# Patient Record
Sex: MALE | Race: WHITE | Employment: FULL TIME | URBAN - METROPOLITAN AREA
[De-identification: names, ages, dates, MRNs, and addresses within clinical notes are randomized per-mention and may not be internally consistent; named-entity substitution may affect disease eponyms.]

---

## 2017-03-02 ENCOUNTER — HOSPITAL ENCOUNTER (EMERGENCY)
Age: 52
Discharge: HOME | End: 2017-03-02
Attending: EMERGENCY MEDICINE
Payer: COMMERCIAL

## 2017-03-02 ENCOUNTER — OFFICE VISIT (OUTPATIENT)
Dept: INTERNAL MEDICINE | Age: 52
End: 2017-03-02

## 2017-03-02 VITALS
WEIGHT: 228 LBS | RESPIRATION RATE: 18 BRPM | OXYGEN SATURATION: 93 % | HEIGHT: 69 IN | TEMPERATURE: 97.7 F | DIASTOLIC BLOOD PRESSURE: 79 MMHG | HEART RATE: 63 BPM | SYSTOLIC BLOOD PRESSURE: 153 MMHG | BODY MASS INDEX: 33.77 KG/M2

## 2017-03-02 VITALS
BODY MASS INDEX: 33.77 KG/M2 | HEIGHT: 69 IN | WEIGHT: 228 LBS | HEART RATE: 90 BPM | SYSTOLIC BLOOD PRESSURE: 130 MMHG | DIASTOLIC BLOOD PRESSURE: 88 MMHG

## 2017-03-02 DIAGNOSIS — I89.1 LYMPHANGITIS: ICD-10-CM

## 2017-03-02 DIAGNOSIS — L03.011 CELLULITIS OF FINGER OF RIGHT HAND: Primary | ICD-10-CM

## 2017-03-02 DIAGNOSIS — L03.113 CELLULITIS OF RIGHT UPPER EXTREMITY: Primary | ICD-10-CM

## 2017-03-02 PROCEDURE — 2580000003 HC RX 258: Performed by: EMERGENCY MEDICINE

## 2017-03-02 PROCEDURE — 96366 THER/PROPH/DIAG IV INF ADDON: CPT

## 2017-03-02 PROCEDURE — 6360000002 HC RX W HCPCS: Performed by: EMERGENCY MEDICINE

## 2017-03-02 PROCEDURE — 99282 EMERGENCY DEPT VISIT SF MDM: CPT

## 2017-03-02 PROCEDURE — 96365 THER/PROPH/DIAG IV INF INIT: CPT

## 2017-03-02 RX ORDER — SULFAMETHOXAZOLE AND TRIMETHOPRIM 800; 160 MG/1; MG/1
1 TABLET ORAL 2 TIMES DAILY
Qty: 14 TABLET | Refills: 0 | Status: SHIPPED | OUTPATIENT
Start: 2017-03-02 | End: 2017-03-09

## 2017-03-02 RX ORDER — CEPHALEXIN 500 MG/1
500 CAPSULE ORAL 4 TIMES DAILY
Qty: 28 CAPSULE | Refills: 0 | Status: SHIPPED | OUTPATIENT
Start: 2017-03-02 | End: 2017-03-09

## 2017-03-02 RX ORDER — SODIUM CHLORIDE 9 MG/ML
INJECTION, SOLUTION INTRAVENOUS CONTINUOUS
Status: DISCONTINUED | OUTPATIENT
Start: 2017-03-02 | End: 2017-03-02 | Stop reason: HOSPADM

## 2017-03-02 RX ADMIN — VANCOMYCIN HYDROCHLORIDE 1000 MG: 1 INJECTION, POWDER, LYOPHILIZED, FOR SOLUTION INTRAVENOUS at 10:32

## 2017-03-02 RX ADMIN — SODIUM CHLORIDE: 9 INJECTION, SOLUTION INTRAVENOUS at 10:31

## 2017-03-02 ASSESSMENT — ENCOUNTER SYMPTOMS
BACK PAIN: 0
DIARRHEA: 0
SHORTNESS OF BREATH: 0
TROUBLE SWALLOWING: 0
BLOOD IN STOOL: 0
VOMITING: 0
COUGH: 0
NAUSEA: 0
EYE PAIN: 0
CHEST TIGHTNESS: 0
COLOR CHANGE: 1
WHEEZING: 0
RHINORRHEA: 0
ABDOMINAL PAIN: 0

## 2017-03-06 ENCOUNTER — OFFICE VISIT (OUTPATIENT)
Dept: INTERNAL MEDICINE | Age: 52
End: 2017-03-06

## 2017-03-06 VITALS
DIASTOLIC BLOOD PRESSURE: 82 MMHG | BODY MASS INDEX: 34.07 KG/M2 | SYSTOLIC BLOOD PRESSURE: 128 MMHG | HEIGHT: 69 IN | WEIGHT: 230 LBS | HEART RATE: 84 BPM | RESPIRATION RATE: 12 BRPM | TEMPERATURE: 97.9 F

## 2017-03-06 DIAGNOSIS — I89.1 LYMPHANGITIS: ICD-10-CM

## 2017-03-06 DIAGNOSIS — L03.113 CELLULITIS OF HAND, RIGHT: Primary | ICD-10-CM

## 2017-03-06 PROCEDURE — 99212 OFFICE O/P EST SF 10 MIN: CPT | Performed by: PHYSICIAN ASSISTANT

## 2017-03-06 RX ORDER — ACETAMINOPHEN 325 MG/1
650 TABLET ORAL PRN
COMMUNITY
End: 2019-06-06 | Stop reason: ALTCHOICE

## 2017-03-06 ASSESSMENT — PATIENT HEALTH QUESTIONNAIRE - PHQ9
SUM OF ALL RESPONSES TO PHQ QUESTIONS 1-9: 0
1. LITTLE INTEREST OR PLEASURE IN DOING THINGS: 0
SUM OF ALL RESPONSES TO PHQ9 QUESTIONS 1 & 2: 0
2. FEELING DOWN, DEPRESSED OR HOPELESS: 0

## 2017-03-06 ASSESSMENT — ENCOUNTER SYMPTOMS: COLOR CHANGE: 1

## 2017-04-26 ENCOUNTER — PATIENT MESSAGE (OUTPATIENT)
Dept: INTERNAL MEDICINE | Age: 52
End: 2017-04-26

## 2017-05-31 ENCOUNTER — OFFICE VISIT (OUTPATIENT)
Dept: INTERNAL MEDICINE | Age: 52
End: 2017-05-31

## 2017-05-31 VITALS
HEIGHT: 69 IN | WEIGHT: 234 LBS | TEMPERATURE: 97.6 F | DIASTOLIC BLOOD PRESSURE: 82 MMHG | SYSTOLIC BLOOD PRESSURE: 120 MMHG | OXYGEN SATURATION: 97 % | BODY MASS INDEX: 34.66 KG/M2 | HEART RATE: 80 BPM

## 2017-05-31 DIAGNOSIS — J30.2 SEASONAL ALLERGIC RHINITIS, UNSPECIFIED ALLERGIC RHINITIS TRIGGER: Primary | ICD-10-CM

## 2017-05-31 PROCEDURE — 99213 OFFICE O/P EST LOW 20 MIN: CPT | Performed by: FAMILY MEDICINE

## 2017-05-31 RX ORDER — CETIRIZINE HYDROCHLORIDE, PSEUDOEPHEDRINE HYDROCHLORIDE 5; 120 MG/1; MG/1
1 TABLET, FILM COATED, EXTENDED RELEASE ORAL 2 TIMES DAILY
Qty: 20 TABLET | Refills: 0 | Status: SHIPPED | OUTPATIENT
Start: 2017-05-31 | End: 2017-06-10

## 2017-05-31 RX ORDER — IPRATROPIUM BROMIDE 42 UG/1
2 SPRAY, METERED NASAL 3 TIMES DAILY
Qty: 1 BOTTLE | Refills: 3 | Status: SHIPPED | OUTPATIENT
Start: 2017-05-31 | End: 2018-08-23

## 2017-06-02 ENCOUNTER — TELEPHONE (OUTPATIENT)
Dept: INTERNAL MEDICINE | Age: 52
End: 2017-06-02

## 2017-06-02 RX ORDER — AZITHROMYCIN 250 MG/1
TABLET, FILM COATED ORAL
Qty: 1 PACKET | Refills: 0 | Status: SHIPPED | OUTPATIENT
Start: 2017-06-02 | End: 2017-06-12

## 2017-07-19 ENCOUNTER — OFFICE VISIT (OUTPATIENT)
Dept: INTERNAL MEDICINE | Age: 52
End: 2017-07-19

## 2017-07-19 VITALS
BODY MASS INDEX: 34.39 KG/M2 | HEART RATE: 85 BPM | SYSTOLIC BLOOD PRESSURE: 102 MMHG | HEIGHT: 69 IN | WEIGHT: 232.2 LBS | DIASTOLIC BLOOD PRESSURE: 80 MMHG

## 2017-07-19 DIAGNOSIS — Z00.00 ANNUAL PHYSICAL EXAM: Primary | ICD-10-CM

## 2017-07-19 DIAGNOSIS — Z13.220 SCREENING CHOLESTEROL LEVEL: ICD-10-CM

## 2017-07-19 DIAGNOSIS — Z11.59 NEED FOR HEPATITIS C SCREENING TEST: ICD-10-CM

## 2017-07-19 DIAGNOSIS — Z13.1 SCREENING FOR DIABETES MELLITUS: ICD-10-CM

## 2017-07-19 DIAGNOSIS — R63.5 WEIGHT GAIN: ICD-10-CM

## 2017-07-19 PROCEDURE — 99396 PREV VISIT EST AGE 40-64: CPT | Performed by: FAMILY MEDICINE

## 2017-07-19 RX ORDER — PHENTERMINE HYDROCHLORIDE 37.5 MG/1
37.5 CAPSULE ORAL EVERY MORNING
Qty: 30 CAPSULE | Refills: 0 | Status: SHIPPED | OUTPATIENT
Start: 2017-07-19 | End: 2017-08-16 | Stop reason: SDUPTHER

## 2017-07-19 ASSESSMENT — ENCOUNTER SYMPTOMS
EYE PAIN: 0
EYE DISCHARGE: 0
EYE ITCHING: 0

## 2017-07-22 ENCOUNTER — HOSPITAL ENCOUNTER (OUTPATIENT)
Dept: LAB | Age: 52
Discharge: HOME | End: 2017-07-22
Payer: COMMERCIAL

## 2017-07-22 DIAGNOSIS — Z13.220 SCREENING CHOLESTEROL LEVEL: ICD-10-CM

## 2017-07-22 DIAGNOSIS — Z13.1 SCREENING FOR DIABETES MELLITUS: ICD-10-CM

## 2017-07-22 DIAGNOSIS — Z11.59 NEED FOR HEPATITIS C SCREENING TEST: ICD-10-CM

## 2017-07-22 LAB
CHOLESTEROL, TOTAL: 174 MG/DL (ref 0–199)
GLUCOSE BLD-MCNC: 101 MG/DL (ref 74–109)
HDLC SERPL-MCNC: 32 MG/DL (ref 40–59)
HEPATITIS C ANTIBODY INTERPRETATION: NORMAL
LDL CHOLESTEROL CALCULATED: 98 MG/DL (ref 0–129)
TRIGL SERPL-MCNC: 222 MG/DL (ref 0–200)

## 2017-07-22 PROCEDURE — 86803 HEPATITIS C AB TEST: CPT

## 2017-07-22 PROCEDURE — 36415 COLL VENOUS BLD VENIPUNCTURE: CPT

## 2017-07-22 PROCEDURE — 82947 ASSAY GLUCOSE BLOOD QUANT: CPT

## 2017-07-22 PROCEDURE — 80061 LIPID PANEL: CPT

## 2017-08-16 ENCOUNTER — OFFICE VISIT (OUTPATIENT)
Dept: INTERNAL MEDICINE | Age: 52
End: 2017-08-16

## 2017-08-16 VITALS
WEIGHT: 229 LBS | BODY MASS INDEX: 33.92 KG/M2 | DIASTOLIC BLOOD PRESSURE: 88 MMHG | HEART RATE: 78 BPM | SYSTOLIC BLOOD PRESSURE: 118 MMHG | HEIGHT: 69 IN

## 2017-08-16 DIAGNOSIS — R63.5 WEIGHT GAIN: ICD-10-CM

## 2017-08-16 PROCEDURE — 99213 OFFICE O/P EST LOW 20 MIN: CPT | Performed by: FAMILY MEDICINE

## 2017-08-16 RX ORDER — PHENTERMINE HYDROCHLORIDE 37.5 MG/1
37.5 CAPSULE ORAL EVERY MORNING
Qty: 30 CAPSULE | Refills: 0 | Status: SHIPPED | OUTPATIENT
Start: 2017-08-16 | End: 2017-09-15

## 2017-08-16 ASSESSMENT — ENCOUNTER SYMPTOMS
SHORTNESS OF BREATH: 0
CHEST TIGHTNESS: 0

## 2017-11-03 ENCOUNTER — NURSE ONLY (OUTPATIENT)
Dept: INTERNAL MEDICINE | Age: 52
End: 2017-11-03

## 2017-11-03 ENCOUNTER — TELEPHONE (OUTPATIENT)
Dept: INTERNAL MEDICINE | Age: 52
End: 2017-11-03

## 2017-11-03 VITALS — SYSTOLIC BLOOD PRESSURE: 140 MMHG | DIASTOLIC BLOOD PRESSURE: 90 MMHG

## 2017-11-03 DIAGNOSIS — R03.0 ELEVATED BP WITHOUT DIAGNOSIS OF HYPERTENSION: Primary | ICD-10-CM

## 2017-11-03 NOTE — PROGRESS NOTES
Pt was in today for a blood pressure check because someone took his bp and it was high.  They told him to have it checked again by us several times to see if its still high

## 2017-11-06 ENCOUNTER — OFFICE VISIT (OUTPATIENT)
Dept: INTERNAL MEDICINE | Age: 52
End: 2017-11-06

## 2017-11-06 VITALS
SYSTOLIC BLOOD PRESSURE: 140 MMHG | BODY MASS INDEX: 34.36 KG/M2 | WEIGHT: 232 LBS | HEIGHT: 69 IN | HEART RATE: 97 BPM | DIASTOLIC BLOOD PRESSURE: 84 MMHG

## 2017-11-06 DIAGNOSIS — Z23 NEED FOR INFLUENZA VACCINATION: ICD-10-CM

## 2017-11-06 DIAGNOSIS — I10 ESSENTIAL HYPERTENSION: Primary | ICD-10-CM

## 2017-11-06 PROCEDURE — 90471 IMMUNIZATION ADMIN: CPT | Performed by: FAMILY MEDICINE

## 2017-11-06 PROCEDURE — 90688 IIV4 VACCINE SPLT 0.5 ML IM: CPT | Performed by: FAMILY MEDICINE

## 2017-11-06 PROCEDURE — 99213 OFFICE O/P EST LOW 20 MIN: CPT | Performed by: FAMILY MEDICINE

## 2017-11-06 RX ORDER — LISINOPRIL 10 MG/1
10 TABLET ORAL DAILY
Qty: 30 TABLET | Refills: 0 | Status: SHIPPED | OUTPATIENT
Start: 2017-11-06 | End: 2018-08-23

## 2017-11-06 NOTE — PATIENT INSTRUCTIONS
Patient Education        DASH Diet: Care Instructions  Your Care Instructions  The DASH diet is an eating plan that can help lower your blood pressure. DASH stands for Dietary Approaches to Stop Hypertension. Hypertension is high blood pressure. The DASH diet focuses on eating foods that are high in calcium, potassium, and magnesium. These nutrients can lower blood pressure. The foods that are highest in these nutrients are fruits, vegetables, low-fat dairy products, nuts, seeds, and legumes. But taking calcium, potassium, and magnesium supplements instead of eating foods that are high in those nutrients does not have the same effect. The DASH diet also includes whole grains, fish, and poultry. The DASH diet is one of several lifestyle changes your doctor may recommend to lower your high blood pressure. Your doctor may also want you to decrease the amount of sodium in your diet. Lowering sodium while following the DASH diet can lower blood pressure even further than just the DASH diet alone. Follow-up care is a key part of your treatment and safety. Be sure to make and go to all appointments, and call your doctor if you are having problems. It's also a good idea to know your test results and keep a list of the medicines you take. How can you care for yourself at home? Following the DASH diet  · Eat 4 to 5 servings of fruit each day. A serving is 1 medium-sized piece of fruit, ½ cup chopped or canned fruit, 1/4 cup dried fruit, or 4 ounces (½ cup) of fruit juice. Choose fruit more often than fruit juice. · Eat 4 to 5 servings of vegetables each day. A serving is 1 cup of lettuce or raw leafy vegetables, ½ cup of chopped or cooked vegetables, or 4 ounces (½ cup) of vegetable juice. Choose vegetables more often than vegetable juice. · Get 2 to 3 servings of low-fat and fat-free dairy each day. A serving is 8 ounces of milk, 1 cup of yogurt, or 1 ½ ounces of cheese. · Eat 6 to 8 servings of grains each day.  A serving is 1 slice of bread, 1 ounce of dry cereal, or ½ cup of cooked rice, pasta, or cooked cereal. Try to choose whole-grain products as much as possible. · Limit lean meat, poultry, and fish to 2 servings each day. A serving is 3 ounces, about the size of a deck of cards. · Eat 4 to 5 servings of nuts, seeds, and legumes (cooked dried beans, lentils, and split peas) each week. A serving is 1/3 cup of nuts, 2 tablespoons of seeds, or ½ cup of cooked beans or peas. · Limit fats and oils to 2 to 3 servings each day. A serving is 1 teaspoon of vegetable oil or 2 tablespoons of salad dressing. · Limit sweets and added sugars to 5 servings or less a week. A serving is 1 tablespoon jelly or jam, ½ cup sorbet, or 1 cup of lemonade. · Eat less than 2,300 milligrams (mg) of sodium a day. If you limit your sodium to 1,500 mg a day, you can lower your blood pressure even more. Tips for success  · Start small. Do not try to make dramatic changes to your diet all at once. You might feel that you are missing out on your favorite foods and then be more likely to not follow the plan. Make small changes, and stick with them. Once those changes become habit, add a few more changes. · Try some of the following:  ¨ Make it a goal to eat a fruit or vegetable at every meal and at snacks. This will make it easy to get the recommended amount of fruits and vegetables each day. ¨ Try yogurt topped with fruit and nuts for a snack or healthy dessert. ¨ Add lettuce, tomato, cucumber, and onion to sandwiches. ¨ Combine a ready-made pizza crust with low-fat mozzarella cheese and lots of vegetable toppings. Try using tomatoes, squash, spinach, broccoli, carrots, cauliflower, and onions. ¨ Have a variety of cut-up vegetables with a low-fat dip as an appetizer instead of chips and dip. ¨ Sprinkle sunflower seeds or chopped almonds over salads. Or try adding chopped walnuts or almonds to cooked vegetables.   ¨ Try some vegetarian meals using beans and peas. Add garbanzo or kidney beans to salads. Make burritos and tacos with mashed avina beans or black beans. Where can you learn more? Go to https://[email or web address]. org and sign in to your [employer] account. Enter A195 in the Webalo box to learn more about \"DASH Diet: Care Instructions. \"     If you do not have an account, please click on the \"Sign Up Now\" link. Current as of: April 3, 2017  Content Version: 11.3  © 4892-3133 [employer]. Care instructions adapted under license by [facility] ([facility]). If you have questions about a medical condition or this instruction, always ask your healthcare professional. [address] any warranty or liability for your use of this information. Patient Education        Learning About High Blood Pressure  What is high blood pressure? Blood pressure is a measure of how hard the blood pushes against the walls of your arteries. It's normal for blood pressure to go up and down throughout the day, but if it stays up, you have high blood pressure. Another name for high blood pressure is hypertension. Two numbers tell you your blood pressure. The first number is the systolic pressure. It shows how hard the blood pushes when your heart is pumping. The second number is the diastolic pressure. It shows how hard the blood pushes between heartbeats, when your heart is relaxed and filling with blood. A blood pressure of less than 120/80 (say \"120 over 80\") is ideal for an adult. High blood pressure is 140/90 or higher. You have high blood pressure if your top number is 140 or higher or your bottom number is 90 or higher, or both. Many people fall into the category in between, called prehypertension. People with prehypertension need to make lifestyle changes to bring their blood pressure down and help prevent or delay high blood pressure. What happens when you have high blood pressure?   · Blood flows through your arteries with too much force. Over time, this damages the walls of your arteries. But you can't feel it. High blood pressure usually doesn't cause symptoms. · Fat and calcium start to build up in your arteries. This buildup is called plaque. Plaque makes your arteries narrower and stiffer. Blood can't flow through them as easily. · This lack of good blood flow starts to damage some of the organs in your body. This can lead to problems such as coronary artery disease and heart attack, heart failure, stroke, kidney failure, and eye damage. How can you prevent high blood pressure? · Stay at a healthy weight. · Try to limit how much sodium you eat to less than 2,300 milligrams (mg) a day. If you limit your sodium to 1,500 mg a day, you can lower your blood pressure even more. ¨ Buy foods that are labeled \"unsalted,\" \"sodium-free,\" or \"low-sodium. \" Foods labeled \"reduced-sodium\" and \"light sodium\" may still have too much sodium. ¨ Flavor your food with garlic, lemon juice, onion, vinegar, herbs, and spices instead of salt. Do not use soy sauce, steak sauce, onion salt, garlic salt, mustard, or ketchup on your food. ¨ Use less salt (or none) when recipes call for it. You can often use half the salt a recipe calls for without losing flavor. · Be physically active. Get at least 30 minutes of exercise on most days of the week. Walking is a good choice. You also may want to do other activities, such as running, swimming, cycling, or playing tennis or team sports. · Limit alcohol to 2 drinks a day for men and 1 drink a day for women. · Eat plenty of fruits, vegetables, and low-fat dairy products. Eat less saturated and total fats. How is high blood pressure treated? · Your doctor will suggest making lifestyle changes. For example, your doctor may ask you to eat healthy foods, quit smoking, lose extra weight, and be more active.   · If lifestyle changes don't help enough or your blood pressure is very high, you

## 2017-11-21 ENCOUNTER — TELEPHONE (OUTPATIENT)
Dept: INTERNAL MEDICINE | Age: 52
End: 2017-11-21

## 2017-11-21 ENCOUNTER — HOSPITAL ENCOUNTER (OUTPATIENT)
Age: 52
Setting detail: SPECIMEN
Discharge: HOME | End: 2017-11-21
Payer: COMMERCIAL

## 2017-11-21 ENCOUNTER — NURSE ONLY (OUTPATIENT)
Dept: INTERNAL MEDICINE | Age: 52
End: 2017-11-21

## 2017-11-21 VITALS — DIASTOLIC BLOOD PRESSURE: 85 MMHG | SYSTOLIC BLOOD PRESSURE: 125 MMHG

## 2017-11-21 DIAGNOSIS — I10 ESSENTIAL HYPERTENSION, BENIGN: ICD-10-CM

## 2017-11-21 LAB
ALBUMIN SERPL-MCNC: 4.4 G/DL (ref 3.9–4.9)
ALP BLD-CCNC: 71 U/L (ref 35–104)
ALT SERPL-CCNC: 40 U/L (ref 0–41)
ANION GAP SERPL CALCULATED.3IONS-SCNC: 16 MEQ/L (ref 7–13)
AST SERPL-CCNC: 32 U/L (ref 0–40)
BILIRUB SERPL-MCNC: 0.4 MG/DL (ref 0–1.2)
BUN BLDV-MCNC: 13 MG/DL (ref 6–20)
CALCIUM SERPL-MCNC: 9.8 MG/DL (ref 8.6–10.2)
CHLORIDE BLD-SCNC: 97 MEQ/L (ref 98–107)
CO2: 26 MEQ/L (ref 22–29)
CREAT SERPL-MCNC: 0.8 MG/DL (ref 0.7–1.2)
GFR AFRICAN AMERICAN: >60
GFR NON-AFRICAN AMERICAN: >60
GLOBULIN: 2.3 G/DL (ref 2.3–3.5)
GLUCOSE BLD-MCNC: 125 MG/DL (ref 74–109)
POTASSIUM SERPL-SCNC: 4.7 MEQ/L (ref 3.5–5.1)
SODIUM BLD-SCNC: 139 MEQ/L (ref 132–144)
TOTAL PROTEIN: 6.7 G/DL (ref 6.4–8.1)
TSH REFLEX: 1.42 UIU/ML (ref 0.27–4.2)

## 2017-11-21 PROCEDURE — 36415 COLL VENOUS BLD VENIPUNCTURE: CPT | Performed by: FAMILY MEDICINE

## 2017-11-21 PROCEDURE — 84443 ASSAY THYROID STIM HORMONE: CPT

## 2017-11-21 PROCEDURE — 80053 COMPREHEN METABOLIC PANEL: CPT

## 2017-11-21 NOTE — TELEPHONE ENCOUNTER
Patient here for bp check today . todays reading here was 125/85.  bp log scanned in media please advise

## 2018-01-06 ENCOUNTER — OFFICE VISIT (OUTPATIENT)
Dept: FAMILY MEDICINE CLINIC | Age: 53
End: 2018-01-06

## 2018-01-06 VITALS
DIASTOLIC BLOOD PRESSURE: 82 MMHG | TEMPERATURE: 97.4 F | HEART RATE: 71 BPM | RESPIRATION RATE: 16 BRPM | BODY MASS INDEX: 36.1 KG/M2 | SYSTOLIC BLOOD PRESSURE: 136 MMHG | OXYGEN SATURATION: 97 % | HEIGHT: 68 IN | WEIGHT: 238.2 LBS

## 2018-01-06 DIAGNOSIS — T14.8XXA MUSCLE STRAIN: ICD-10-CM

## 2018-01-06 DIAGNOSIS — B96.89 ACUTE BACTERIAL SINUSITIS: Primary | ICD-10-CM

## 2018-01-06 DIAGNOSIS — J01.90 ACUTE BACTERIAL SINUSITIS: Primary | ICD-10-CM

## 2018-01-06 PROCEDURE — 99213 OFFICE O/P EST LOW 20 MIN: CPT | Performed by: NURSE PRACTITIONER

## 2018-01-06 RX ORDER — TIZANIDINE 4 MG/1
4 TABLET ORAL 3 TIMES DAILY
Qty: 21 TABLET | Refills: 0 | Status: SHIPPED | OUTPATIENT
Start: 2018-01-06 | End: 2018-01-13

## 2018-01-06 RX ORDER — AMOXICILLIN AND CLAVULANATE POTASSIUM 875; 125 MG/1; MG/1
1 TABLET, FILM COATED ORAL 2 TIMES DAILY
Qty: 14 TABLET | Refills: 0 | Status: SHIPPED | OUTPATIENT
Start: 2018-01-06 | End: 2018-01-13

## 2018-01-06 ASSESSMENT — ENCOUNTER SYMPTOMS
ABDOMINAL PAIN: 0
ANAL BLEEDING: 0
CHEST TIGHTNESS: 0
BACK PAIN: 1
COUGH: 1
HOARSE VOICE: 0
CONSTIPATION: 0
SHORTNESS OF BREATH: 0
SWOLLEN GLANDS: 0
SORE THROAT: 1
ABDOMINAL DISTENTION: 0
BLOOD IN STOOL: 0
DIARRHEA: 0
WHEEZING: 0
NAUSEA: 0
SINUS PRESSURE: 1
VOMITING: 0
BOWEL INCONTINENCE: 0

## 2018-01-06 NOTE — PROGRESS NOTES
kg)   SpO2 97%   BMI 36.22 kg/m²     Physical Exam   Constitutional: He is oriented to person, place, and time. Vital signs are normal. He appears well-developed and well-nourished. No distress. HENT:   Head: Normocephalic and atraumatic. Right Ear: External ear and ear canal normal. No drainage, swelling or tenderness. Tympanic membrane is not erythematous. A middle ear effusion is present. Left Ear: External ear and ear canal normal. No drainage, swelling or tenderness. Tympanic membrane is not erythematous. A middle ear effusion is present. Nose: Mucosal edema, rhinorrhea and sinus tenderness present. Right sinus exhibits no maxillary sinus tenderness and no frontal sinus tenderness. Left sinus exhibits no maxillary sinus tenderness and no frontal sinus tenderness. Mouth/Throat: Uvula is midline and mucous membranes are normal. Posterior oropharyngeal edema and posterior oropharyngeal erythema present. No oropharyngeal exudate. Cardiovascular: Normal rate, regular rhythm, S1 normal, S2 normal, normal heart sounds, intact distal pulses and normal pulses. No murmur heard. Pulmonary/Chest: Effort normal and breath sounds normal. No accessory muscle usage. No respiratory distress. He has no decreased breath sounds. He has no wheezes. He has no rhonchi. He has no rales. Musculoskeletal:        Lumbar back: He exhibits tenderness, swelling and pain. He exhibits normal range of motion, no bony tenderness, no deformity, no laceration and normal pulse. Lymphadenopathy:     He has cervical adenopathy. Neurological: He is alert and oriented to person, place, and time. Skin: Skin is warm, dry and intact. No rash noted. Psychiatric: He has a normal mood and affect. His behavior is normal.     Assessment & Plan:     1. Acute bacterial sinusitis  amoxicillin-clavulanate (AUGMENTIN) 875-125 MG per tablet   2.  Muscle strain  tiZANidine (ZANAFLEX) 4 MG tablet     Orders Placed This Encounter Medications    amoxicillin-clavulanate (AUGMENTIN) 875-125 MG per tablet     Sig: Take 1 tablet by mouth 2 times daily for 7 days     Dispense:  14 tablet     Refill:  0    tiZANidine (ZANAFLEX) 4 MG tablet     Sig: Take 1 tablet by mouth 3 times daily for 7 days     Dispense:  21 tablet     Refill:  0     Return if symptoms worsen or fail to improve. Advised patient to drink plenty of fluids and to take the antibiotic with food. If any problems occur, an appointment should be made or ER visit if severe. Because of the risk with ANY antibiotic of C. Difficile colitis if persistent diarrhea or abdominal pain or any concerning symptoms, we should be notified. To reduce this risk, a probiotic pill, yogurt or other preparations containing active cultures should be ingested daily -particularly while on the antibiotic. If any persistent symptoms of illness, follow up appointment should be made in a timely fashion with a provider. Reviewed with the patient: current clinical status, medications, activities and diet. Side effects, adverse effects of the medication prescribed today, as well as treatment plan/ rationale and result expectations have been discussed with the patient who expresses understanding and desires to proceed. Pt instructions reviewed and given to patient.     Close follow up to evaluate treatment results and for coordination of care. I have reviewed the patient's medical history in detail and updated the computerized patient record.     Alexandria Cheek NP

## 2018-02-17 ENCOUNTER — OFFICE VISIT (OUTPATIENT)
Dept: FAMILY MEDICINE CLINIC | Age: 53
End: 2018-02-17
Payer: COMMERCIAL

## 2018-02-17 VITALS
RESPIRATION RATE: 14 BRPM | WEIGHT: 237 LBS | DIASTOLIC BLOOD PRESSURE: 82 MMHG | BODY MASS INDEX: 35.1 KG/M2 | HEART RATE: 98 BPM | HEIGHT: 69 IN | SYSTOLIC BLOOD PRESSURE: 136 MMHG | TEMPERATURE: 98.4 F | OXYGEN SATURATION: 95 %

## 2018-02-17 DIAGNOSIS — J10.1 INFLUENZA A: Primary | ICD-10-CM

## 2018-02-17 DIAGNOSIS — R68.89 FLU-LIKE SYMPTOMS: ICD-10-CM

## 2018-02-17 DIAGNOSIS — J11.1 BRONCHITIS WITH FLU: ICD-10-CM

## 2018-02-17 LAB
INFLUENZA A ANTIBODY: NORMAL
INFLUENZA B ANTIBODY: NORMAL

## 2018-02-17 PROCEDURE — 99213 OFFICE O/P EST LOW 20 MIN: CPT | Performed by: NURSE PRACTITIONER

## 2018-02-17 PROCEDURE — 87804 INFLUENZA ASSAY W/OPTIC: CPT | Performed by: NURSE PRACTITIONER

## 2018-02-17 RX ORDER — AZITHROMYCIN 250 MG/1
TABLET, FILM COATED ORAL
Qty: 6 TABLET | Refills: 0 | Status: SHIPPED | OUTPATIENT
Start: 2018-02-17 | End: 2018-04-29 | Stop reason: ALTCHOICE

## 2018-02-17 RX ORDER — OSELTAMIVIR PHOSPHATE 75 MG/1
75 CAPSULE ORAL 2 TIMES DAILY
Qty: 10 CAPSULE | Refills: 0 | Status: SHIPPED | OUTPATIENT
Start: 2018-02-17 | End: 2018-02-22

## 2018-02-17 ASSESSMENT — ENCOUNTER SYMPTOMS
HOARSE VOICE: 0
ABDOMINAL PAIN: 0
RHINORRHEA: 1
COUGH: 1
WHEEZING: 0
SINUS COMPLAINT: 1
FLU SYMPTOMS: 1
DIARRHEA: 0
CHEST TIGHTNESS: 0
NAUSEA: 0
SINUS PRESSURE: 1
CHANGE IN BOWEL HABIT: 0
SWOLLEN GLANDS: 0
VOMITING: 0
SHORTNESS OF BREATH: 0
SORE THROAT: 0

## 2018-02-17 NOTE — PROGRESS NOTES
Surgical History:   Procedure Laterality Date    COLONOSCOPY  2014    1 polyp, 5 year follow up   [address]       Family History   Problem Relation Age of Onset    High Blood Pressure Mother     Diabetes Mother     Heart Disease Father     Other Paternal Uncle      lupus runs in multiple family members     Social History     Social History    Marital status:      Spouse name: N/A    Number of children: 3    Years of education: N/A     Occupational History     at [employer]       Social History Main Topics    Smoking status: Former Smoker     Types: Cigarettes     Quit date: 2/3/1996    Smokeless tobacco: Never Used    Alcohol use No    Drug use: No    Sexual activity: Yes     Partners: Female     Other Topics Concern    Not on file     Social History Narrative    No narrative on file     Allergies:  Codeine    Review of Systems   Constitutional: Positive for activity change, chills, diaphoresis and fatigue. Negative for appetite change and fever. HENT: Positive for congestion, postnasal drip, rhinorrhea and sinus pressure. Negative for ear pain, hoarse voice, sneezing and sore throat. Respiratory: Positive for cough. Negative for chest tightness, shortness of breath and wheezing. Cardiovascular: Negative for chest pain. Gastrointestinal: Negative for abdominal pain, anorexia, change in bowel habit, diarrhea, nausea and vomiting. Musculoskeletal: Positive for myalgias. Skin: Negative for rash. Neurological: Positive for headaches. Negative for dizziness, vertigo, syncope, weakness and light-headedness. Objective:   /82 (Site: Left Arm, Position: Sitting, Cuff Size: Large Adult)   Pulse 98   Temp 98.4 °F (36.9 °C) (Temporal)   Resp 14   Ht 5' 9\" (1.753 m)   Wt 237 lb (107.5 kg)   SpO2 95%   BMI 35.00 kg/m²     Physical Exam   Constitutional: He is oriented to person, place, and time. He appears well-developed and well-nourished.  No

## 2018-04-29 ENCOUNTER — OFFICE VISIT (OUTPATIENT)
Dept: FAMILY MEDICINE CLINIC | Age: 53
End: 2018-04-29
Payer: COMMERCIAL

## 2018-04-29 VITALS
OXYGEN SATURATION: 96 % | WEIGHT: 233 LBS | DIASTOLIC BLOOD PRESSURE: 70 MMHG | HEIGHT: 69 IN | TEMPERATURE: 97.1 F | RESPIRATION RATE: 14 BRPM | SYSTOLIC BLOOD PRESSURE: 134 MMHG | HEART RATE: 79 BPM | BODY MASS INDEX: 34.51 KG/M2

## 2018-04-29 DIAGNOSIS — B96.89 ACUTE BACTERIAL SINUSITIS: Primary | ICD-10-CM

## 2018-04-29 DIAGNOSIS — J01.90 ACUTE BACTERIAL SINUSITIS: Primary | ICD-10-CM

## 2018-04-29 PROCEDURE — 99213 OFFICE O/P EST LOW 20 MIN: CPT | Performed by: PHYSICIAN ASSISTANT

## 2018-04-29 RX ORDER — AMOXICILLIN AND CLAVULANATE POTASSIUM 875; 125 MG/1; MG/1
1 TABLET, FILM COATED ORAL 2 TIMES DAILY
Qty: 20 TABLET | Refills: 0 | Status: SHIPPED | OUTPATIENT
Start: 2018-04-29 | End: 2018-05-09

## 2018-04-29 ASSESSMENT — ENCOUNTER SYMPTOMS
SORE THROAT: 0
SINUS PRESSURE: 1
COUGH: 1
SINUS COMPLAINT: 1
SHORTNESS OF BREATH: 0

## 2018-05-21 ENCOUNTER — OFFICE VISIT (OUTPATIENT)
Dept: INTERNAL MEDICINE | Age: 53
End: 2018-05-21
Payer: COMMERCIAL

## 2018-05-21 VITALS
OXYGEN SATURATION: 94 % | BODY MASS INDEX: 34.42 KG/M2 | WEIGHT: 232.4 LBS | DIASTOLIC BLOOD PRESSURE: 80 MMHG | SYSTOLIC BLOOD PRESSURE: 130 MMHG | HEART RATE: 70 BPM | TEMPERATURE: 97.8 F | HEIGHT: 69 IN

## 2018-05-21 DIAGNOSIS — Z91.09 ENVIRONMENTAL ALLERGIES: Primary | ICD-10-CM

## 2018-05-21 PROCEDURE — 96372 THER/PROPH/DIAG INJ SC/IM: CPT | Performed by: PHYSICIAN ASSISTANT

## 2018-05-21 PROCEDURE — 99213 OFFICE O/P EST LOW 20 MIN: CPT | Performed by: PHYSICIAN ASSISTANT

## 2018-05-21 RX ORDER — METHYLPREDNISOLONE ACETATE 80 MG/ML
80 INJECTION, SUSPENSION INTRA-ARTICULAR; INTRALESIONAL; INTRAMUSCULAR; SOFT TISSUE ONCE
Status: COMPLETED | OUTPATIENT
Start: 2018-05-21 | End: 2018-05-21

## 2018-05-21 RX ADMIN — METHYLPREDNISOLONE ACETATE 80 MG: 80 INJECTION, SUSPENSION INTRA-ARTICULAR; INTRALESIONAL; INTRAMUSCULAR; SOFT TISSUE at 14:14

## 2018-05-21 ASSESSMENT — PATIENT HEALTH QUESTIONNAIRE - PHQ9
SUM OF ALL RESPONSES TO PHQ QUESTIONS 1-9: 0
SUM OF ALL RESPONSES TO PHQ9 QUESTIONS 1 & 2: 0
1. LITTLE INTEREST OR PLEASURE IN DOING THINGS: 0
2. FEELING DOWN, DEPRESSED OR HOPELESS: 0

## 2018-05-21 ASSESSMENT — ENCOUNTER SYMPTOMS
SINUS PAIN: 0
SORE THROAT: 1
SHORTNESS OF BREATH: 0
TROUBLE SWALLOWING: 0
RHINORRHEA: 1
FACIAL SWELLING: 0
VOICE CHANGE: 0
COUGH: 0
SINUS PRESSURE: 1

## 2018-05-28 ENCOUNTER — TELEPHONE (OUTPATIENT)
Dept: FAMILY MEDICINE CLINIC | Age: 53
End: 2018-05-28

## 2018-05-28 DIAGNOSIS — H92.03 OTALGIA OF BOTH EARS: Primary | ICD-10-CM

## 2018-05-28 DIAGNOSIS — R42 VERTIGO: ICD-10-CM

## 2018-05-28 RX ORDER — AZITHROMYCIN 250 MG/1
TABLET, FILM COATED ORAL
Qty: 6 TABLET | Refills: 0 | Status: SHIPPED | OUTPATIENT
Start: 2018-05-28 | End: 2018-06-07

## 2018-05-28 RX ORDER — PSEUDOEPHEDRINE HYDROCHLORIDE 30 MG/1
30 TABLET ORAL EVERY 6 HOURS PRN
Qty: 30 TABLET | Refills: 0 | Status: SHIPPED | OUTPATIENT
Start: 2018-05-28 | End: 2018-08-23

## 2018-05-28 RX ORDER — MECLIZINE HYDROCHLORIDE 25 MG/1
25 TABLET ORAL 3 TIMES DAILY PRN
Qty: 30 TABLET | Refills: 0 | Status: SHIPPED | OUTPATIENT
Start: 2018-05-28 | End: 2018-06-07

## 2018-06-11 DIAGNOSIS — Z91.09 ENVIRONMENTAL ALLERGIES: ICD-10-CM

## 2018-06-11 RX ORDER — CETIRIZINE HYDROCHLORIDE 10 MG/1
TABLET ORAL
Qty: 30 TABLET | Refills: 0 | Status: SHIPPED | OUTPATIENT
Start: 2018-06-11 | End: 2018-08-23

## 2018-08-23 ENCOUNTER — OFFICE VISIT (OUTPATIENT)
Dept: INTERNAL MEDICINE | Age: 53
End: 2018-08-23
Payer: COMMERCIAL

## 2018-08-23 ENCOUNTER — HOSPITAL ENCOUNTER (OUTPATIENT)
Age: 53
Setting detail: SPECIMEN
Discharge: HOME | End: 2018-08-23
Payer: COMMERCIAL

## 2018-08-23 VITALS
HEART RATE: 76 BPM | WEIGHT: 229.2 LBS | DIASTOLIC BLOOD PRESSURE: 80 MMHG | HEIGHT: 69 IN | BODY MASS INDEX: 33.95 KG/M2 | SYSTOLIC BLOOD PRESSURE: 130 MMHG

## 2018-08-23 DIAGNOSIS — Z00.00 ANNUAL PHYSICAL EXAM: Primary | ICD-10-CM

## 2018-08-23 DIAGNOSIS — Z13.1 SCREENING FOR DIABETES MELLITUS (DM): ICD-10-CM

## 2018-08-23 DIAGNOSIS — Z00.00 ANNUAL PHYSICAL EXAM: ICD-10-CM

## 2018-08-23 DIAGNOSIS — Z12.11 SCREEN FOR COLON CANCER: ICD-10-CM

## 2018-08-23 DIAGNOSIS — Z13.220 SCREENING CHOLESTEROL LEVEL: ICD-10-CM

## 2018-08-23 LAB
ALBUMIN SERPL-MCNC: 4.5 G/DL (ref 3.9–4.9)
ALP BLD-CCNC: 65 U/L (ref 35–104)
ALT SERPL-CCNC: 37 U/L (ref 0–41)
ANION GAP SERPL CALCULATED.3IONS-SCNC: 17 MEQ/L (ref 7–13)
AST SERPL-CCNC: 31 U/L (ref 0–40)
BASOPHILS ABSOLUTE: 0 K/UL (ref 0–0.2)
BASOPHILS RELATIVE PERCENT: 0.6 %
BILIRUB SERPL-MCNC: 0.7 MG/DL (ref 0–1.2)
BUN BLDV-MCNC: 19 MG/DL (ref 6–20)
CALCIUM SERPL-MCNC: 9.4 MG/DL (ref 8.6–10.2)
CHLORIDE BLD-SCNC: 103 MEQ/L (ref 98–107)
CHOLESTEROL, TOTAL: 175 MG/DL (ref 0–199)
CO2: 25 MEQ/L (ref 22–29)
CREAT SERPL-MCNC: 0.85 MG/DL (ref 0.7–1.2)
EOSINOPHILS ABSOLUTE: 0.2 K/UL (ref 0–0.7)
EOSINOPHILS RELATIVE PERCENT: 2.5 %
GFR AFRICAN AMERICAN: >60
GFR NON-AFRICAN AMERICAN: >60
GLOBULIN: 2.4 G/DL (ref 2.3–3.5)
GLUCOSE BLD-MCNC: 89 MG/DL (ref 74–109)
HCT VFR BLD CALC: 49.7 % (ref 42–52)
HDLC SERPL-MCNC: 32 MG/DL (ref 40–59)
HEMOGLOBIN: 16.8 G/DL (ref 14–18)
LDL CHOLESTEROL CALCULATED: 96 MG/DL (ref 0–129)
LYMPHOCYTES ABSOLUTE: 2 K/UL (ref 1–4.8)
LYMPHOCYTES RELATIVE PERCENT: 27.2 %
MCH RBC QN AUTO: 30.6 PG (ref 27–31.3)
MCHC RBC AUTO-ENTMCNC: 33.8 % (ref 33–37)
MCV RBC AUTO: 90.5 FL (ref 80–100)
MONOCYTES ABSOLUTE: 0.7 K/UL (ref 0.2–0.8)
MONOCYTES RELATIVE PERCENT: 9.2 %
NEUTROPHILS ABSOLUTE: 4.4 K/UL (ref 1.4–6.5)
NEUTROPHILS RELATIVE PERCENT: 60.5 %
PDW BLD-RTO: 15.2 % (ref 11.5–14.5)
PLATELET # BLD: 156 K/UL (ref 130–400)
POTASSIUM SERPL-SCNC: 3.9 MEQ/L (ref 3.5–5.1)
RBC # BLD: 5.49 M/UL (ref 4.7–6.1)
SODIUM BLD-SCNC: 145 MEQ/L (ref 132–144)
TOTAL PROTEIN: 6.9 G/DL (ref 6.4–8.1)
TRIGL SERPL-MCNC: 236 MG/DL (ref 0–200)
WBC # BLD: 7.2 K/UL (ref 4.8–10.8)

## 2018-08-23 PROCEDURE — 80061 LIPID PANEL: CPT

## 2018-08-23 PROCEDURE — 80053 COMPREHEN METABOLIC PANEL: CPT

## 2018-08-23 PROCEDURE — 85025 COMPLETE CBC W/AUTO DIFF WBC: CPT

## 2018-08-23 PROCEDURE — 99396 PREV VISIT EST AGE 40-64: CPT | Performed by: FAMILY MEDICINE

## 2018-08-23 ASSESSMENT — ENCOUNTER SYMPTOMS
EYE ITCHING: 0
CHEST TIGHTNESS: 0
APNEA: 0
CHOKING: 0
EYE PAIN: 0
EYE DISCHARGE: 0

## 2018-09-10 ENCOUNTER — ANESTHESIA (OUTPATIENT)
Dept: ENDOSCOPY | Age: 53
End: 2018-09-10
Payer: COMMERCIAL

## 2018-09-10 ENCOUNTER — ANESTHESIA EVENT (OUTPATIENT)
Dept: ENDOSCOPY | Age: 53
End: 2018-09-10
Payer: COMMERCIAL

## 2018-09-10 ENCOUNTER — HOSPITAL ENCOUNTER (OUTPATIENT)
Age: 53
Setting detail: OUTPATIENT SURGERY
Discharge: HOME | End: 2018-09-10
Attending: SPECIALIST | Admitting: SPECIALIST
Payer: COMMERCIAL

## 2018-09-10 VITALS
HEART RATE: 67 BPM | RESPIRATION RATE: 16 BRPM | SYSTOLIC BLOOD PRESSURE: 133 MMHG | OXYGEN SATURATION: 95 % | TEMPERATURE: 97.2 F | DIASTOLIC BLOOD PRESSURE: 87 MMHG | BODY MASS INDEX: 37.35 KG/M2 | HEIGHT: 67 IN | WEIGHT: 238 LBS

## 2018-09-10 VITALS
DIASTOLIC BLOOD PRESSURE: 59 MMHG | SYSTOLIC BLOOD PRESSURE: 105 MMHG | OXYGEN SATURATION: 98 % | RESPIRATION RATE: 13 BRPM

## 2018-09-10 PROCEDURE — 2580000003 HC RX 258: Performed by: SPECIALIST

## 2018-09-10 PROCEDURE — 88305 TISSUE EXAM BY PATHOLOGIST: CPT

## 2018-09-10 PROCEDURE — 7100000010 HC PHASE II RECOVERY - FIRST 15 MIN: Performed by: SPECIALIST

## 2018-09-10 PROCEDURE — 6360000002 HC RX W HCPCS

## 2018-09-10 PROCEDURE — 2500000003 HC RX 250 WO HCPCS: Performed by: SPECIALIST

## 2018-09-10 PROCEDURE — 3700000000 HC ANESTHESIA ATTENDED CARE: Performed by: SPECIALIST

## 2018-09-10 PROCEDURE — 3609027000 HC COLONOSCOPY: Performed by: SPECIALIST

## 2018-09-10 PROCEDURE — 3700000001 HC ADD 15 MINUTES (ANESTHESIA): Performed by: SPECIALIST

## 2018-09-10 PROCEDURE — 7100000011 HC PHASE II RECOVERY - ADDTL 15 MIN: Performed by: SPECIALIST

## 2018-09-10 RX ORDER — PROPOFOL 10 MG/ML
INJECTION, EMULSION INTRAVENOUS CONTINUOUS PRN
Status: DISCONTINUED | OUTPATIENT
Start: 2018-09-10 | End: 2018-09-10 | Stop reason: SDUPTHER

## 2018-09-10 RX ORDER — SODIUM CHLORIDE 0.9 % (FLUSH) 0.9 %
10 SYRINGE (ML) INJECTION EVERY 12 HOURS SCHEDULED
Status: DISCONTINUED | OUTPATIENT
Start: 2018-09-10 | End: 2018-09-10 | Stop reason: HOSPADM

## 2018-09-10 RX ORDER — PROPOFOL 10 MG/ML
INJECTION, EMULSION INTRAVENOUS PRN
Status: DISCONTINUED | OUTPATIENT
Start: 2018-09-10 | End: 2018-09-10 | Stop reason: SDUPTHER

## 2018-09-10 RX ORDER — LIDOCAINE HYDROCHLORIDE 10 MG/ML
1 INJECTION, SOLUTION EPIDURAL; INFILTRATION; INTRACAUDAL; PERINEURAL
Status: COMPLETED | OUTPATIENT
Start: 2018-09-10 | End: 2018-09-10

## 2018-09-10 RX ORDER — SODIUM CHLORIDE 9 MG/ML
INJECTION, SOLUTION INTRAVENOUS CONTINUOUS
Status: DISCONTINUED | OUTPATIENT
Start: 2018-09-10 | End: 2018-09-10 | Stop reason: HOSPADM

## 2018-09-10 RX ORDER — SODIUM CHLORIDE 0.9 % (FLUSH) 0.9 %
10 SYRINGE (ML) INJECTION PRN
Status: DISCONTINUED | OUTPATIENT
Start: 2018-09-10 | End: 2018-09-10 | Stop reason: HOSPADM

## 2018-09-10 RX ADMIN — SODIUM CHLORIDE: 9 INJECTION, SOLUTION INTRAVENOUS at 07:03

## 2018-09-10 RX ADMIN — PROPOFOL 40 MG: 10 INJECTION, EMULSION INTRAVENOUS at 08:06

## 2018-09-10 RX ADMIN — Medication 5 ML: at 08:07

## 2018-09-10 RX ADMIN — LIDOCAINE HYDROCHLORIDE 30 MG: 10 INJECTION, SOLUTION EPIDURAL; INFILTRATION; INTRACAUDAL; PERINEURAL at 08:07

## 2018-09-10 RX ADMIN — PROPOFOL 100 MCG/KG/MIN: 10 INJECTION, EMULSION INTRAVENOUS at 08:07

## 2018-09-10 RX ADMIN — Medication 40 ML: at 07:45

## 2018-09-10 RX ADMIN — PROPOFOL 40 MG: 10 INJECTION, EMULSION INTRAVENOUS at 08:12

## 2018-09-10 NOTE — ANESTHESIA POSTPROCEDURE EVALUATION
Department of Anesthesiology  Postprocedure Note    Patient: Ellen Rogers  MRN: [id number]  [place]: 1965  Date of evaluation: 9/10/2018  Time:  8:27 AM     Procedure Summary     Date:  09/10/18 Room / Location:  [facility] OR 01 / [facility]    Anesthesia Start:  0805 Anesthesia Stop:  0551    Procedure:  COLONOSCOPY (N/A ) Diagnosis:  (SCREENING Z12.11 ))    Surgeon:  Musa Paul MD Responsible Provider:  MONIQUE Sandoval CRNA    Anesthesia Type:  MAC ASA Status:  2          Anesthesia Type: MAC    Britney Phase I:      Britney Phase II:      Last vitals: Reviewed and per EMR flowsheets.        Anesthesia Post Evaluation    Patient location during evaluation: bedside  Patient participation: complete - patient participated  Level of consciousness: awake  Pain score: 0  Airway patency: patent  Nausea & Vomiting: no nausea and no vomiting  Complications: no  Cardiovascular status: blood pressure returned to baseline  Respiratory status: acceptable  Hydration status: euvolemic

## 2018-09-10 NOTE — ANESTHESIA PRE PROCEDURE
0652   BP: (!) 170/93   Pulse: 69   Resp: 20   Temp: 36.2 °C (97.2 °F)   TempSrc: Temporal   SpO2: 96%   Weight: 238 lb (108 kg)   Height: 5' 7\" (1.702 m)                                              BP Readings from Last 3 Encounters:   09/10/18 (!) 170/93   08/23/18 130/80   05/21/18 130/80       NPO Status: Time of last liquid consumption: 0530                        Time of last solid consumption: 0700                        Date of last liquid consumption: 09/10/18 (drank 6 ounces black coffee)                        Date of last solid food consumption: 09/09/18 (toast)    BMI:   Wt Readings from Last 3 Encounters:   09/10/18 238 lb (108 kg)   08/23/18 229 lb 3.2 oz (104 kg)   05/21/18 232 lb 6.4 oz (105.4 kg)     Body mass index is 37.28 kg/m². CBC:   Lab Results   Component Value Date    WBC 7.2 08/23/2018    RBC 5.49 08/23/2018    HGB 16.8 08/23/2018    HCT 49.7 08/23/2018    MCV 90.5 08/23/2018    RDW 15.2 08/23/2018     08/23/2018       CMP:   Lab Results   Component Value Date     08/23/2018    K 3.9 08/23/2018     08/23/2018    CO2 25 08/23/2018    BUN 19 08/23/2018    CREATININE 0.85 08/23/2018    GFRAA >60.0 08/23/2018    LABGLOM >60.0 08/23/2018    GLUCOSE 89 08/23/2018    PROT 6.9 08/23/2018    CALCIUM 9.4 08/23/2018    BILITOT 0.7 08/23/2018    ALKPHOS 65 08/23/2018    AST 31 08/23/2018    ALT 37 08/23/2018       POC Tests: No results for input(s): POCGLU, POCNA, POCK, POCCL, POCBUN, POCHEMO, POCHCT in the last 72 hours.     Coags: No results found for: PROTIME, INR, APTT    HCG (If Applicable): No results found for: PREGTESTUR, PREGSERUM, HCG, HCGQUANT     ABGs: No results found for: PHART, PO2ART, BZP4ZOQ, YGV7NEC, BEART, J5BATONP     Type & Screen (If Applicable):  No results found for: LABABO, [address]    Anesthesia Evaluation  Patient summary reviewed and Nursing notes reviewed  Airway: Mallampati: II  TM distance: <3 FB   Neck ROM: full  Mouth opening: > = 3 FB Dental:

## 2018-09-10 NOTE — ANESTHESIA PRE PROCEDURE
(Current):   Vitals:    09/10/18 0652   BP: (!) 170/93   Pulse: 69   Resp: 20   Temp: 36.2 °C (97.2 °F)   TempSrc: Temporal   SpO2: 96%   Weight: 238 lb (108 kg)   Height: 5' 7\" (1.702 m)                                              BP Readings from Last 3 Encounters:   09/10/18 (!) 170/93   08/23/18 130/80   05/21/18 130/80       NPO Status: Time of last liquid consumption: 0530                        Time of last solid consumption: 0700                        Date of last liquid consumption: 09/10/18 (drank 6 ounces black coffee)                        Date of last solid food consumption: 09/09/18 (toast)    BMI:   Wt Readings from Last 3 Encounters:   09/10/18 238 lb (108 kg)   08/23/18 229 lb 3.2 oz (104 kg)   05/21/18 232 lb 6.4 oz (105.4 kg)     Body mass index is 37.28 kg/m². CBC:   Lab Results   Component Value Date    WBC 7.2 08/23/2018    RBC 5.49 08/23/2018    HGB 16.8 08/23/2018    HCT 49.7 08/23/2018    MCV 90.5 08/23/2018    RDW 15.2 08/23/2018     08/23/2018       CMP:   Lab Results   Component Value Date     08/23/2018    K 3.9 08/23/2018     08/23/2018    CO2 25 08/23/2018    BUN 19 08/23/2018    CREATININE 0.85 08/23/2018    GFRAA >60.0 08/23/2018    LABGLOM >60.0 08/23/2018    GLUCOSE 89 08/23/2018    PROT 6.9 08/23/2018    CALCIUM 9.4 08/23/2018    BILITOT 0.7 08/23/2018    ALKPHOS 65 08/23/2018    AST 31 08/23/2018    ALT 37 08/23/2018       POC Tests: No results for input(s): POCGLU, POCNA, POCK, POCCL, POCBUN, POCHEMO, POCHCT in the last 72 hours.     Coags: No results found for: PROTIME, INR, APTT    HCG (If Applicable): No results found for: PREGTESTUR, PREGSERUM, HCG, HCGQUANT     ABGs: No results found for: PHART, PO2ART, KPT1DHY, ILO1TDB, BEART, T1HZICEI     Type & Screen (If Applicable):  No results found for: [facility]    Anesthesia Evaluation  Patient summary reviewed and Nursing notes reviewed  Airway: Mallampati: I  TM distance: >3 FB   Neck ROM: full  Mouth

## 2018-11-06 ENCOUNTER — OFFICE VISIT (OUTPATIENT)
Dept: INTERNAL MEDICINE | Age: 53
End: 2018-11-06
Payer: COMMERCIAL

## 2018-11-06 VITALS
HEIGHT: 68 IN | SYSTOLIC BLOOD PRESSURE: 130 MMHG | OXYGEN SATURATION: 98 % | DIASTOLIC BLOOD PRESSURE: 88 MMHG | TEMPERATURE: 96.9 F | BODY MASS INDEX: 35.01 KG/M2 | WEIGHT: 231 LBS | HEART RATE: 97 BPM

## 2018-11-06 DIAGNOSIS — R09.81 HEAD CONGESTION: Primary | ICD-10-CM

## 2018-11-06 DIAGNOSIS — M72.2 PLANTAR FASCIITIS, LEFT: ICD-10-CM

## 2018-11-06 LAB
INFLUENZA A ANTIBODY: NORMAL
INFLUENZA B ANTIBODY: NORMAL

## 2018-11-06 PROCEDURE — 87804 INFLUENZA ASSAY W/OPTIC: CPT | Performed by: PHYSICIAN ASSISTANT

## 2018-11-06 PROCEDURE — 99214 OFFICE O/P EST MOD 30 MIN: CPT | Performed by: PHYSICIAN ASSISTANT

## 2018-11-06 RX ORDER — NAPROXEN 375 MG/1
375 TABLET ORAL 2 TIMES DAILY WITH MEALS
Qty: 20 TABLET | Refills: 0 | Status: SHIPPED | OUTPATIENT
Start: 2018-11-06 | End: 2019-06-06 | Stop reason: ALTCHOICE

## 2018-11-06 ASSESSMENT — ENCOUNTER SYMPTOMS
RHINORRHEA: 1
COUGH: 1
WHEEZING: 0
SORE THROAT: 0
NAUSEA: 0
CHEST TIGHTNESS: 0
SINUS PAIN: 0

## 2018-11-06 NOTE — PROGRESS NOTES
Temp: 96.9 °F (36.1 °C)    TempSrc: Temporal    SpO2: 98%    Weight: 231 lb (104.8 kg)    Height: 5' 8\" (1.727 m)      Estimated body mass index is 35.12 kg/m² as calculated from the following:    Height as of this encounter: 5' 8\" (1.727 m). Weight as of this encounter: 231 lb (104.8 kg). Physical Exam   Constitutional: He appears well-developed and well-nourished. HENT:   Head: Normocephalic. Mouth/Throat: Oropharynx is clear and moist.   Eyes: Conjunctivae are normal.   Neck: Normal range of motion. Neck supple. Cardiovascular: Normal rate and normal heart sounds. Pulmonary/Chest: Effort normal and breath sounds normal.   Musculoskeletal:   Heel and plantar tenderness to palpation    Lymphadenopathy:     He has no cervical adenopathy. Skin: Skin is warm. No erythema. ASSESSMENT/PLAN:  1. Head congestion  - POCT Influenza A/B- neg  -  No antibiotics indicated at this time   -  Advised to use OTC cold meds, rest, and increase fluids    2. Plantar fasciitis, left  - naproxen (NAPROSYN) 375 MG tablet; Take 1 tablet by mouth 2 times daily (with meals)  Dispense: 20 tablet; Refill: 0  - XR FOOT LEFT (2 VIEWS); Future  - after  xr is resulted, will send to podiatry if needed  - continue exercises     No Follow-up on file. An electronic signature was used to authenticate this note.     --ELADIO Sheppard on 11/6/2018 at 4:16 PM

## 2019-03-18 ENCOUNTER — HOSPITAL ENCOUNTER (EMERGENCY)
Age: 54
Discharge: HOME | End: 2019-03-18
Attending: EMERGENCY MEDICINE
Payer: COMMERCIAL

## 2019-03-18 ENCOUNTER — APPOINTMENT (OUTPATIENT)
Dept: CT IMAGING | Age: 54
End: 2019-03-18
Payer: COMMERCIAL

## 2019-03-18 VITALS
TEMPERATURE: 97.7 F | HEIGHT: 68 IN | BODY MASS INDEX: 35.46 KG/M2 | OXYGEN SATURATION: 97 % | SYSTOLIC BLOOD PRESSURE: 150 MMHG | DIASTOLIC BLOOD PRESSURE: 91 MMHG | RESPIRATION RATE: 18 BRPM | WEIGHT: 234 LBS | HEART RATE: 95 BPM

## 2019-03-18 DIAGNOSIS — R10.9 RIGHT FLANK PAIN: Primary | ICD-10-CM

## 2019-03-18 DIAGNOSIS — K57.30 DIVERTICULOSIS OF LARGE INTESTINE WITHOUT HEMORRHAGE: ICD-10-CM

## 2019-03-18 LAB
ANION GAP SERPL CALCULATED.3IONS-SCNC: 13 MEQ/L (ref 9–15)
BASOPHILS ABSOLUTE: 0 K/UL (ref 0–0.2)
BASOPHILS RELATIVE PERCENT: 0.4 %
BILIRUBIN URINE: NEGATIVE
BLOOD, URINE: NEGATIVE
BUN BLDV-MCNC: 14 MG/DL (ref 6–20)
CALCIUM SERPL-MCNC: 10 MG/DL (ref 8.5–9.9)
CHLORIDE BLD-SCNC: 101 MEQ/L (ref 95–107)
CLARITY: CLEAR
CO2: 29 MEQ/L (ref 20–31)
COLOR: YELLOW
CREAT SERPL-MCNC: 0.78 MG/DL (ref 0.7–1.2)
EOSINOPHILS ABSOLUTE: 0.1 K/UL (ref 0–0.7)
EOSINOPHILS RELATIVE PERCENT: 1.3 %
GFR AFRICAN AMERICAN: >60
GFR NON-AFRICAN AMERICAN: >60
GLUCOSE BLD-MCNC: 117 MG/DL (ref 70–99)
GLUCOSE URINE: NEGATIVE MG/DL
HCT VFR BLD CALC: 49.1 % (ref 42–52)
HEMOGLOBIN: 16.7 G/DL (ref 14–18)
KETONES, URINE: NEGATIVE MG/DL
LEUKOCYTE ESTERASE, URINE: NEGATIVE
LYMPHOCYTES ABSOLUTE: 1.6 K/UL (ref 1–4.8)
LYMPHOCYTES RELATIVE PERCENT: 21.4 %
MCH RBC QN AUTO: 29.7 PG (ref 27–31.3)
MCHC RBC AUTO-ENTMCNC: 34 % (ref 33–37)
MCV RBC AUTO: 87.2 FL (ref 80–100)
MONOCYTES ABSOLUTE: 0.5 K/UL (ref 0.2–0.8)
MONOCYTES RELATIVE PERCENT: 6.4 %
NEUTROPHILS ABSOLUTE: 5.3 K/UL (ref 1.4–6.5)
NEUTROPHILS RELATIVE PERCENT: 70.5 %
NITRITE, URINE: NEGATIVE
PDW BLD-RTO: 14.4 % (ref 11.5–14.5)
PH UA: 5.5 (ref 5–9)
PLATELET # BLD: 176 K/UL (ref 130–400)
POTASSIUM SERPL-SCNC: 4 MEQ/L (ref 3.4–4.9)
PROTEIN UA: NEGATIVE MG/DL
RBC # BLD: 5.63 M/UL (ref 4.7–6.1)
SODIUM BLD-SCNC: 143 MEQ/L (ref 135–144)
SPECIFIC GRAVITY UA: >=1.03 (ref 1–1.03)
URINE REFLEX TO CULTURE: NORMAL
UROBILINOGEN, URINE: 0.2 E.U./DL
WBC # BLD: 7.5 K/UL (ref 4.8–10.8)

## 2019-03-18 PROCEDURE — 74176 CT ABD & PELVIS W/O CONTRAST: CPT

## 2019-03-18 PROCEDURE — 96375 TX/PRO/DX INJ NEW DRUG ADDON: CPT

## 2019-03-18 PROCEDURE — 85025 COMPLETE CBC W/AUTO DIFF WBC: CPT

## 2019-03-18 PROCEDURE — 6360000002 HC RX W HCPCS: Performed by: EMERGENCY MEDICINE

## 2019-03-18 PROCEDURE — 96374 THER/PROPH/DIAG INJ IV PUSH: CPT

## 2019-03-18 PROCEDURE — 81003 URINALYSIS AUTO W/O SCOPE: CPT

## 2019-03-18 PROCEDURE — 80048 BASIC METABOLIC PNL TOTAL CA: CPT

## 2019-03-18 PROCEDURE — 99284 EMERGENCY DEPT VISIT MOD MDM: CPT

## 2019-03-18 PROCEDURE — 2580000003 HC RX 258: Performed by: EMERGENCY MEDICINE

## 2019-03-18 PROCEDURE — 36415 COLL VENOUS BLD VENIPUNCTURE: CPT

## 2019-03-18 RX ORDER — ONDANSETRON 4 MG/1
4 TABLET, FILM COATED ORAL EVERY 8 HOURS PRN
Qty: 20 TABLET | Refills: 0 | Status: SHIPPED | OUTPATIENT
Start: 2019-03-18 | End: 2019-06-06 | Stop reason: ALTCHOICE

## 2019-03-18 RX ORDER — 0.9 % SODIUM CHLORIDE 0.9 %
1000 INTRAVENOUS SOLUTION INTRAVENOUS ONCE
Status: COMPLETED | OUTPATIENT
Start: 2019-03-18 | End: 2019-03-18

## 2019-03-18 RX ORDER — ONDANSETRON 2 MG/ML
4 INJECTION INTRAMUSCULAR; INTRAVENOUS ONCE
Status: COMPLETED | OUTPATIENT
Start: 2019-03-18 | End: 2019-03-18

## 2019-03-18 RX ORDER — KETOROLAC TROMETHAMINE 30 MG/ML
30 INJECTION, SOLUTION INTRAMUSCULAR; INTRAVENOUS ONCE
Status: COMPLETED | OUTPATIENT
Start: 2019-03-18 | End: 2019-03-18

## 2019-03-18 RX ORDER — KETOROLAC TROMETHAMINE 30 MG/ML
60 INJECTION, SOLUTION INTRAMUSCULAR; INTRAVENOUS ONCE
Status: DISCONTINUED | OUTPATIENT
Start: 2019-03-18 | End: 2019-03-18

## 2019-03-18 RX ORDER — KETOROLAC TROMETHAMINE 10 MG/1
10 TABLET, FILM COATED ORAL EVERY 6 HOURS PRN
Qty: 20 TABLET | Refills: 0 | Status: SHIPPED | OUTPATIENT
Start: 2019-03-18 | End: 2019-06-06 | Stop reason: ALTCHOICE

## 2019-03-18 RX ADMIN — KETOROLAC TROMETHAMINE 30 MG: 30 INJECTION, SOLUTION INTRAMUSCULAR; INTRAVENOUS at 14:40

## 2019-03-18 RX ADMIN — SODIUM CHLORIDE 1000 ML: 9 INJECTION, SOLUTION INTRAVENOUS at 14:39

## 2019-03-18 RX ADMIN — ONDANSETRON 4 MG: 2 INJECTION INTRAMUSCULAR; INTRAVENOUS at 14:40

## 2019-03-18 ASSESSMENT — ENCOUNTER SYMPTOMS
EYE PAIN: 0
RHINORRHEA: 0
COLOR CHANGE: 0
ABDOMINAL PAIN: 0
VOMITING: 0
WHEEZING: 0
SINUS PRESSURE: 0
DIARRHEA: 0
PHOTOPHOBIA: 0
COUGH: 0
SHORTNESS OF BREATH: 0
APNEA: 0
ABDOMINAL DISTENTION: 0
BACK PAIN: 0
SORE THROAT: 0
NAUSEA: 0
CONSTIPATION: 0

## 2019-03-18 ASSESSMENT — PAIN SCALES - GENERAL: PAINLEVEL_OUTOF10: 6

## 2019-06-02 ENCOUNTER — OFFICE VISIT (OUTPATIENT)
Dept: INTERNAL MEDICINE | Age: 54
End: 2019-06-02
Payer: COMMERCIAL

## 2019-06-02 VITALS
DIASTOLIC BLOOD PRESSURE: 78 MMHG | TEMPERATURE: 97.4 F | RESPIRATION RATE: 20 BRPM | BODY MASS INDEX: 35.1 KG/M2 | WEIGHT: 231.6 LBS | OXYGEN SATURATION: 95 % | SYSTOLIC BLOOD PRESSURE: 138 MMHG | HEIGHT: 68 IN | HEART RATE: 77 BPM

## 2019-06-02 DIAGNOSIS — J21.9 ACUTE BRONCHIOLITIS DUE TO UNSPECIFIED ORGANISM: Primary | ICD-10-CM

## 2019-06-02 PROCEDURE — 99213 OFFICE O/P EST LOW 20 MIN: CPT | Performed by: NURSE PRACTITIONER

## 2019-06-02 RX ORDER — BENZONATATE 100 MG/1
200 CAPSULE ORAL 3 TIMES DAILY PRN
Qty: 20 CAPSULE | Refills: 0 | Status: SHIPPED | OUTPATIENT
Start: 2019-06-02 | End: 2019-06-06 | Stop reason: ALTCHOICE

## 2019-06-02 RX ORDER — PREDNISONE 50 MG/1
50 TABLET ORAL DAILY
Qty: 5 TABLET | Refills: 0 | Status: SHIPPED | OUTPATIENT
Start: 2019-06-02 | End: 2019-06-06 | Stop reason: ALTCHOICE

## 2019-06-02 RX ORDER — AZITHROMYCIN 250 MG/1
250 TABLET, FILM COATED ORAL SEE ADMIN INSTRUCTIONS
Qty: 6 TABLET | Refills: 0 | Status: SHIPPED | OUTPATIENT
Start: 2019-06-02 | End: 2019-06-06 | Stop reason: ALTCHOICE

## 2019-06-02 ASSESSMENT — PATIENT HEALTH QUESTIONNAIRE - PHQ9
SUM OF ALL RESPONSES TO PHQ QUESTIONS 1-9: 0
2. FEELING DOWN, DEPRESSED OR HOPELESS: 0
SUM OF ALL RESPONSES TO PHQ9 QUESTIONS 1 & 2: 0
1. LITTLE INTEREST OR PLEASURE IN DOING THINGS: 0
SUM OF ALL RESPONSES TO PHQ QUESTIONS 1-9: 0

## 2019-06-02 ASSESSMENT — ENCOUNTER SYMPTOMS
RHINORRHEA: 1
BACK PAIN: 0
VOMITING: 0
SWOLLEN GLANDS: 0
COUGH: 1
NAUSEA: 1
SORE THROAT: 0
ABDOMINAL DISTENTION: 0
ABDOMINAL PAIN: 0
SINUS PAIN: 1
SHORTNESS OF BREATH: 1
DIARRHEA: 0

## 2019-06-02 NOTE — PROGRESS NOTES
xraySubjective  Jac Crane, 62 y.o. male presents today with:  Chief Complaint   Patient presents with    Cough     1 wk dry     Sinusitis     headache, facial pain, post nasal drip     URI     HX of bronchitis        URI    This is a new problem. The current episode started in the past 7 days. The problem has been gradually worsening. There has been no fever. Associated symptoms include coughing (mostly at night dry non-productive ), nausea (just this morning), rhinorrhea and sinus pain (frontal ). Pertinent negatives include no abdominal pain, chest pain, congestion, diarrhea, dysuria, ear pain, headaches, neck pain, sore throat, swollen glands or vomiting. He has tried antihistamine and acetaminophen for the symptoms. The treatment provided mild relief. Patient was a previous smoker in the past 30 years ago, never diagnosed with copd. Review of Systems   Constitutional: Negative for activity change, appetite change and fever. HENT: Positive for rhinorrhea and sinus pain (frontal ). Negative for congestion, drooling, ear pain and sore throat. Respiratory: Positive for cough (mostly at night dry non-productive ) and shortness of breath (intermittent shortness of breath, feels like her can't get air in occured this morning). Cardiovascular: Negative for chest pain. Gastrointestinal: Positive for nausea (just this morning). Negative for abdominal distention, abdominal pain, diarrhea and vomiting. Genitourinary: Negative for dysuria. Musculoskeletal: Negative for arthralgias, back pain and neck pain. Neurological: Negative for dizziness, weakness, light-headedness and headaches. Hematological: Negative for adenopathy. Psychiatric/Behavioral: Negative for agitation and behavioral problems. All other systems reviewed and are negative.       Objective    Vitals:    06/02/19 1338 06/02/19 1342   BP: (!) 144/88 138/78   Pulse: 77    Resp: 20    Temp: 97.4 °F (36.3 °C)    TempSrc: Oral SpO2: 95%    Weight: 231 lb 9.6 oz (105.1 kg)    Height: 5' 8\" (1.727 m)        Physical Exam   Constitutional: He is oriented to person, place, and time. He appears well-developed and well-nourished. HENT:   Head: Normocephalic and atraumatic. Right Ear: External ear normal.   Left Ear: External ear normal.   Nose: Nose normal.   Mouth/Throat: Oropharynx is clear and moist. No oropharyngeal exudate, posterior oropharyngeal edema, posterior oropharyngeal erythema or tonsillar abscesses. Eyes: Pupils are equal, round, and reactive to light. Conjunctivae and EOM are normal.   Neck: Normal range of motion. Neck supple. No JVD present. No tracheal deviation present. Cardiovascular: Normal heart sounds and intact distal pulses. Pulmonary/Chest: Effort normal. He has wheezes (faint inspiratory wheezing bilateral). Abdominal: Soft. Musculoskeletal: Normal range of motion. Lymphadenopathy:     He has no cervical adenopathy. Neurological: He is alert and oriented to person, place, and time. Skin: Skin is warm and dry. Capillary refill takes less than 2 seconds. Psychiatric: He has a normal mood and affect. His behavior is normal.   Nursing note and vitals reviewed. POC Testing Today: No results found for this visit on 06/02/19. Assessment & Plan    Diagnosis Orders   1. Acute bronchiolitis due to unspecified organism  XR CHEST STANDARD (2 VW)       Return in about 3 days (around 6/5/2019), or if symptoms worsen or fail to improve. Side effects and adverse effects of any medication prescribed today, as well as treatment plan/rationale,follow-up care, and result expectations have been discussed with the patient. Expresses understanding and desires to proceed with treatment plan. Discussed signs and symptoms which require immediate follow-up in ED/call to 911. Understanding verbalized. I have reviewed and updated the electronic medical record.     Charles Lopes, APRN - CNP

## 2019-06-02 NOTE — Clinical Note
Patient seen for one week history of dry non-productive cough and sinus pressure. Sent for outpatient xray, provided with azithromycin, prednisone and albuterol inhaler.  Previous smoker in the past.

## 2019-06-06 ENCOUNTER — OFFICE VISIT (OUTPATIENT)
Dept: FAMILY MEDICINE CLINIC | Age: 54
End: 2019-06-06
Payer: COMMERCIAL

## 2019-06-06 VITALS
SYSTOLIC BLOOD PRESSURE: 140 MMHG | TEMPERATURE: 97.4 F | RESPIRATION RATE: 16 BRPM | BODY MASS INDEX: 34.89 KG/M2 | HEIGHT: 68 IN | HEART RATE: 81 BPM | WEIGHT: 230.2 LBS | OXYGEN SATURATION: 97 % | DIASTOLIC BLOOD PRESSURE: 90 MMHG

## 2019-06-06 DIAGNOSIS — I10 ESSENTIAL HYPERTENSION: Primary | ICD-10-CM

## 2019-06-06 DIAGNOSIS — R06.83 SNORING: ICD-10-CM

## 2019-06-06 DIAGNOSIS — M79.671 FOOT PAIN, BILATERAL: ICD-10-CM

## 2019-06-06 DIAGNOSIS — R53.83 FATIGUE, UNSPECIFIED TYPE: ICD-10-CM

## 2019-06-06 DIAGNOSIS — E78.1 HYPERTRIGLYCERIDEMIA: ICD-10-CM

## 2019-06-06 DIAGNOSIS — Z87.891 HISTORY OF TOBACCO USE: Chronic | ICD-10-CM

## 2019-06-06 DIAGNOSIS — G47.10 HYPERSOMNIA: ICD-10-CM

## 2019-06-06 DIAGNOSIS — M79.672 FOOT PAIN, BILATERAL: ICD-10-CM

## 2019-06-06 PROBLEM — Z86.010 HISTORY OF COLON POLYPS: Chronic | Status: ACTIVE | Noted: 2019-06-06

## 2019-06-06 PROCEDURE — 99214 OFFICE O/P EST MOD 30 MIN: CPT | Performed by: FAMILY MEDICINE

## 2019-06-06 RX ORDER — HYDROCHLOROTHIAZIDE 12.5 MG/1
12.5 CAPSULE, GELATIN COATED ORAL EVERY MORNING
Qty: 30 CAPSULE | Refills: 5 | Status: SHIPPED | OUTPATIENT
Start: 2019-06-06 | End: 2019-09-19 | Stop reason: SDUPTHER

## 2019-06-06 SDOH — HEALTH STABILITY: PHYSICAL HEALTH: ON AVERAGE, HOW MANY DAYS PER WEEK DO YOU ENGAGE IN MODERATE TO STRENUOUS EXERCISE (LIKE A BRISK WALK)?: 0 DAYS

## 2019-06-06 SDOH — HEALTH STABILITY: PHYSICAL HEALTH: ON AVERAGE, HOW MANY MINUTES DO YOU ENGAGE IN EXERCISE AT THIS LEVEL?: 0 MIN

## 2019-06-06 SDOH — HEALTH STABILITY: MENTAL HEALTH
STRESS IS WHEN SOMEONE FEELS TENSE, NERVOUS, ANXIOUS, OR CAN'T SLEEP AT NIGHT BECAUSE THEIR MIND IS TROUBLED. HOW STRESSED ARE YOU?: NOT AT ALL

## 2019-06-06 SDOH — HEALTH STABILITY: MENTAL HEALTH: HOW OFTEN DO YOU HAVE A DRINK CONTAINING ALCOHOL?: NEVER

## 2019-06-06 ASSESSMENT — ENCOUNTER SYMPTOMS
DIARRHEA: 0
VOMITING: 0
NAUSEA: 0
CONSTIPATION: 0
CHEST TIGHTNESS: 0
BLOOD IN STOOL: 0
ANAL BLEEDING: 0
ABDOMINAL PAIN: 0
COUGH: 0
SHORTNESS OF BREATH: 0
WHEEZING: 0
BACK PAIN: 0

## 2019-06-06 NOTE — PATIENT INSTRUCTIONS
Patient Education        DASH Diet: Care Instructions  Your Care Instructions    The DASH diet is an eating plan that can help lower your blood pressure. DASH stands for Dietary Approaches to Stop Hypertension. Hypertension is high blood pressure. The DASH diet focuses on eating foods that are high in calcium, potassium, and magnesium. These nutrients can lower blood pressure. The foods that are highest in these nutrients are fruits, vegetables, low-fat dairy products, nuts, seeds, and legumes. But taking calcium, potassium, and magnesium supplements instead of eating foods that are high in those nutrients does not have the same effect. The DASH diet also includes whole grains, fish, and poultry. The DASH diet is one of several lifestyle changes your doctor may recommend to lower your high blood pressure. Your doctor may also want you to decrease the amount of sodium in your diet. Lowering sodium while following the DASH diet can lower blood pressure even further than just the DASH diet alone. Follow-up care is a key part of your treatment and safety. Be sure to make and go to all appointments, and call your doctor if you are having problems. It's also a good idea to know your test results and keep a list of the medicines you take. How can you care for yourself at home? Following the DASH diet  · Eat 4 to 5 servings of fruit each day. A serving is 1 medium-sized piece of fruit, ½ cup chopped or canned fruit, 1/4 cup dried fruit, or 4 ounces (½ cup) of fruit juice. Choose fruit more often than fruit juice. · Eat 4 to 5 servings of vegetables each day. A serving is 1 cup of lettuce or raw leafy vegetables, ½ cup of chopped or cooked vegetables, or 4 ounces (½ cup) of vegetable juice. Choose vegetables more often than vegetable juice. · Get 2 to 3 servings of low-fat and fat-free dairy each day. A serving is 8 ounces of milk, 1 cup of yogurt, or 1 ½ ounces of cheese. · Eat 6 to 8 servings of grains each day. meals using beans and peas. Add garbanzo or kidney beans to salads. Make burritos and tacos with mashed avina beans or black beans. Where can you learn more? Go to https://chjhonataneb.[employer]. org and sign in to your [employer] account. Enter A464 in the Mobile Complete box to learn more about \"DASH Diet: Care Instructions. \"     If you do not have an account, please click on the \"Sign Up Now\" link. Current as of: July 22, 2018  Content Version: 12.0  © 2158-2551 Healthwise, Incorporated. Care instructions adapted under license by [facility] [address] If you have questions about a medical condition or this instruction, always ask your healthcare professional. [address] any warranty or liability for your use of this information.

## 2019-06-06 NOTE — PROGRESS NOTES
Subjective:      Patient ID: Horacio Mccain is a 62 y.o. male who presents today for:     Chief Complaint   Patient [facility]   [facility] to transfer care from Dr. Nicole Beckett reports that over the past six months he has been having problems with both legs where they want to lock up and he has trouble walking       HPI     Patient presents for visit to establish care. He was previously seen by Dr. Marisabel Guerrero at the [facility] office. He reports one year history of persistent fatigue throughout the day. He reports not feeling rested despite getting 7-8 hours of sleep per night. Patient is reported to snore by wife when he is sleeping on his back. Wife denies noting any apneic episodes. He denies any weight changes, appetite changes, F/C/S, N/V, abdominal pain, change in stooling, rectal bleeding, hematochezia, melena, polyuria, polydipsia, or hematuria. He reports chronic foot pain bilaterally causing him problems with prolonged walking or standing. He denies any preceding injury or change to his activity level.  He denies seeing podiatry in the past.     Past Medical History:   Diagnosis Date    Gastroesophageal reflux disease without esophagitis 3/22/2016    History of colon polyps 6/6/2019    History of tobacco use 6/6/2019    Hypertriglyceridemia 6/6/2019    Presbyopia 4/11/2015     Past Surgical History:   Procedure Laterality Date    COLONOSCOPY  2014    polyp, 5y follow up ( [address] [place] [address])    HERNIA REPAIR  2203    periumbilical hernia    [place] COLON CA SCRN NOT HI RSK IND N/A 9/10/2018    polyps x 4, diverticulosis, int hemorr, 3y repeat (DR BLANTON)  COLONOSCOPY performed by Arcelia Wilkinson MD at [address] [facility]     Family History   Problem Relation Age of Onset    High Blood Pressure Mother     Diabetes Mother     High Cholesterol Father     Coronary Art Dis Father         CABG x 3    No Known Problems Brother  Lupus Paternal Uncle     No Known Problems Son     No Known Problems Son     No Known Problems Son      Social History     Socioeconomic History    Marital status:      Spouse name: Not on file    Number of children: 3    Years of education: Not on file    Highest education level: Not on file   Occupational History    Occupation:      Comment: [employer]   Social Needs    Financial resource strain: Not on file    Food insecurity:     Worry: Not on file     Inability: Not on file   Appriss needs:     Medical: Not on file     Non-medical: Not on file   Tobacco Use    Smoking status: Former Smoker     Packs/day: 2.00     Years: 18.00     Pack years: 36.00     Types: Cigarettes     Start date:      Last attempt to quit: 2/3/1996     Years since quittin.3    Smokeless tobacco: Never Used   Substance and Sexual Activity    Alcohol use: No     Alcohol/week: 0.0 oz     Frequency: Never    Drug use: No    Sexual activity: Yes     Partners: Female     Comment: monogamous   Lifestyle    Physical activity:     Days per week: 0 days     Minutes per session: 0 min    Stress: Not at all   Relationships    Social connections:     Talks on phone: Not on file     Gets together: Not on file     Attends Latter day service: Not on file     Active member of club or organization: Not on file     Attends meetings of clubs or organizations: Not on file     Relationship status: Not on file    Intimate partner violence:     Fear of current or ex partner: Not on file     Emotionally abused: Not on file     Physically abused: Not on file     Forced sexual activity: Not on file   Other Topics Concern    Not on file   Social History Narrative    Lives with wife at home        2 dogs        Hobbies: watch TV      No current outpatient medications on file prior to visit. No current facility-administered medications on file prior to visit.         Allergies:  Augmentin [amoxicillin-pot clavulanate] and Codeine    Review of Systems   Constitutional: Negative for appetite change, chills, diaphoresis, fatigue, fever and unexpected weight change. Eyes: Negative for visual disturbance. Respiratory: Negative for cough, chest tightness, shortness of breath and wheezing. Cardiovascular: Negative for chest pain, palpitations and leg swelling. No orthopnea, No PND   Gastrointestinal: Negative for abdominal pain, anal bleeding, blood in stool, constipation, diarrhea, nausea and vomiting. No heartburn, No melena   Endocrine: Negative for cold intolerance, heat intolerance, polydipsia, polyphagia and polyuria. Genitourinary: Negative for dysuria, frequency, hematuria and urgency. Musculoskeletal: Positive for arthralgias (knees, feet). Negative for back pain and neck pain. Skin: Negative for rash. Neurological: Negative for dizziness, tremors, syncope, weakness, light-headedness, numbness and headaches. Psychiatric/Behavioral: Negative for dysphoric mood. The patient is not nervous/anxious. Objective:     BP (!) 140/90   Pulse 81   Temp 97.4 °F (36.3 °C) (Temporal)   Resp 16   Ht 5' 7.5\" (1.715 m) Comment: checked at OV today  Wt 230 lb 3.2 oz (104.4 kg)   SpO2 97%   BMI 35.52 kg/m²     Physical Exam   Constitutional: He is oriented to person, place, and time. He appears well-developed and well-nourished. Neck: Neck supple. Carotid bruit is not present. No thyromegaly present. Cardiovascular: Normal rate, regular rhythm, normal heart sounds and intact distal pulses. No murmur heard. Pulmonary/Chest: Effort normal and breath sounds normal. No respiratory distress. He has no wheezes. Abdominal: Soft. Bowel sounds are normal. He exhibits no distension. There is no tenderness. There is no rebound and no guarding. Musculoskeletal: Normal range of motion. He exhibits no edema.         Right foot: There is normal range of motion, no tenderness, no bony tenderness, no swelling, normal capillary refill, no crepitus and no deformity (mild flat foot). Left foot: There is normal range of motion, no tenderness, no bony tenderness, no swelling, normal capillary refill, no crepitus and no deformity (mild flat foot). Lymphadenopathy:     He has no cervical adenopathy. Neurological: He is alert and oriented to person, place, and time. Skin: Skin is warm. No rash noted. Psychiatric: He has a normal mood and affect. Ortho Exam (If Applicable)      Assessment & Plan:      1. Essential hypertension  Elevated blood pressure today in the office. Patient agrees to start thiazide diuretic for management and keep close follow-up in the next month for reassessment. I reviewed with him and his wife the importance of a low salt diet and he was given DASH handout today for review    - hydrochlorothiazide (MICROZIDE) 12.5 MG capsule; Take 1 capsule by mouth every morning  Dispense: 30 capsule; Refill: 5  - Comprehensive Metabolic Panel; Future    2. Hypersomnia    - Baseline Diagnostic Sleep Study; Future    3. Snoring    - Baseline Diagnostic Sleep Study; Future    4. Fatigue, unspecified type    - CBC Auto Differential; Future  - Comprehensive Metabolic Panel; Future  - TSH without Reflex; Future  - Vitamin D 25 Hydroxy; Future  - Hemoglobin A1C; Future    5. Hypertriglyceridemia    - Lipid Panel; Future    6. Foot pain, bilateral  Likely component of arthritis. I suspect flat feet contributing. Patient will discuss with podiatry any further management options/orthotics. - AFL - [place] dong, EVANGELISTA, Podiatry, [place]  - XR FOOT LEFT (MIN 3 VIEWS); Future  - XR FOOT RIGHT (MIN 3 VIEWS); Future    7.  History of tobacco use        Modified Medications    No medications on file          New Prescriptions    HYDROCHLOROTHIAZIDE (MICROZIDE) 12.5 MG CAPSULE    Take 1 capsule by mouth every morning          Medications Discontinued During This Encounter

## 2019-06-07 ENCOUNTER — TELEPHONE (OUTPATIENT)
Dept: FAMILY MEDICINE CLINIC | Age: 54
End: 2019-06-07

## 2019-06-07 NOTE — TELEPHONE ENCOUNTER
Called patient to review with him that with mental status changes/disorientation along with difficulty with ambulation/lower extremity weakness he should be seen in the emergency room for further evaluation urgently/ASAP.   Patient voiced understanding and stated he would go in for evaluation    I will await ER records to review

## 2019-06-07 NOTE — TELEPHONE ENCOUNTER
Patient's wife called stating that the the patient was sent home from work because he was disoriented and feeling weird. He is on new BP medication. She will take his BP once he gets home and will call us with the reading. In the meantime she is requesting a call on her cell phone at [phone].   Sammie Lopez

## 2019-06-08 ENCOUNTER — HOSPITAL ENCOUNTER (OUTPATIENT)
Age: 54
Discharge: HOME | End: 2019-06-10
Payer: COMMERCIAL

## 2019-06-08 ENCOUNTER — HOSPITAL ENCOUNTER (OUTPATIENT)
Dept: GENERAL RADIOLOGY | Age: 54
Discharge: HOME | End: 2019-06-10
Payer: COMMERCIAL

## 2019-06-08 ENCOUNTER — HOSPITAL ENCOUNTER (OUTPATIENT)
Dept: LAB | Age: 54
Discharge: HOME | End: 2019-06-08
Payer: COMMERCIAL

## 2019-06-08 DIAGNOSIS — I10 ESSENTIAL HYPERTENSION: ICD-10-CM

## 2019-06-08 DIAGNOSIS — M79.672 FOOT PAIN, BILATERAL: ICD-10-CM

## 2019-06-08 DIAGNOSIS — M79.671 FOOT PAIN, BILATERAL: ICD-10-CM

## 2019-06-08 DIAGNOSIS — E78.1 HYPERTRIGLYCERIDEMIA: ICD-10-CM

## 2019-06-08 DIAGNOSIS — R53.83 FATIGUE, UNSPECIFIED TYPE: ICD-10-CM

## 2019-06-08 LAB
ALBUMIN SERPL-MCNC: 4.4 G/DL (ref 3.5–4.6)
ALP BLD-CCNC: 75 U/L (ref 35–104)
ALT SERPL-CCNC: 24 U/L (ref 0–41)
ANION GAP SERPL CALCULATED.3IONS-SCNC: 14 MEQ/L (ref 9–15)
AST SERPL-CCNC: 21 U/L (ref 0–40)
BASOPHILS ABSOLUTE: 0.1 K/UL (ref 0–0.2)
BASOPHILS RELATIVE PERCENT: 0.7 %
BILIRUB SERPL-MCNC: 0.5 MG/DL (ref 0.2–0.7)
BUN BLDV-MCNC: 17 MG/DL (ref 6–20)
CALCIUM SERPL-MCNC: 9.3 MG/DL (ref 8.5–9.9)
CHLORIDE BLD-SCNC: 97 MEQ/L (ref 95–107)
CHOLESTEROL, TOTAL: 196 MG/DL (ref 0–199)
CO2: 27 MEQ/L (ref 20–31)
CREAT SERPL-MCNC: 0.81 MG/DL (ref 0.7–1.2)
EOSINOPHILS ABSOLUTE: 0.2 K/UL (ref 0–0.7)
EOSINOPHILS RELATIVE PERCENT: 2.3 %
GFR AFRICAN AMERICAN: >60
GFR NON-AFRICAN AMERICAN: >60
GLOBULIN: 2.4 G/DL (ref 2.3–3.5)
GLUCOSE BLD-MCNC: 90 MG/DL (ref 70–99)
HBA1C MFR BLD: 5.7 % (ref 4.8–5.9)
HCT VFR BLD CALC: 50.6 % (ref 42–52)
HDLC SERPL-MCNC: 32 MG/DL (ref 40–59)
HEMOGLOBIN: 17.1 G/DL (ref 14–18)
LDL CHOLESTEROL CALCULATED: ABNORMAL MG/DL (ref 0–129)
LYMPHOCYTES ABSOLUTE: 2.1 K/UL (ref 1–4.8)
LYMPHOCYTES RELATIVE PERCENT: 25.2 %
MCH RBC QN AUTO: 30.6 PG (ref 27–31.3)
MCHC RBC AUTO-ENTMCNC: 33.8 % (ref 33–37)
MCV RBC AUTO: 90.4 FL (ref 80–100)
MONOCYTES ABSOLUTE: 0.7 K/UL (ref 0.2–0.8)
MONOCYTES RELATIVE PERCENT: 8.9 %
NEUTROPHILS ABSOLUTE: 5.1 K/UL (ref 1.4–6.5)
NEUTROPHILS RELATIVE PERCENT: 62.9 %
PDW BLD-RTO: 15.1 % (ref 11.5–14.5)
PLATELET # BLD: 166 K/UL (ref 130–400)
POTASSIUM SERPL-SCNC: 4.6 MEQ/L (ref 3.4–4.9)
RBC # BLD: 5.6 M/UL (ref 4.7–6.1)
SODIUM BLD-SCNC: 138 MEQ/L (ref 135–144)
TOTAL PROTEIN: 6.8 G/DL (ref 6.3–8)
TRIGL SERPL-MCNC: 510 MG/DL (ref 0–150)
TSH SERPL DL<=0.05 MIU/L-ACNC: 2.99 UIU/ML (ref 0.44–3.86)
VITAMIN D 25-HYDROXY: 26.2 NG/ML (ref 30–100)
WBC # BLD: 8.1 K/UL (ref 4.8–10.8)

## 2019-06-08 PROCEDURE — 36415 COLL VENOUS BLD VENIPUNCTURE: CPT

## 2019-06-08 PROCEDURE — 85025 COMPLETE CBC W/AUTO DIFF WBC: CPT

## 2019-06-08 PROCEDURE — 84443 ASSAY THYROID STIM HORMONE: CPT

## 2019-06-08 PROCEDURE — 73630 X-RAY EXAM OF FOOT: CPT

## 2019-06-08 PROCEDURE — 80061 LIPID PANEL: CPT

## 2019-06-08 PROCEDURE — 82306 VITAMIN D 25 HYDROXY: CPT

## 2019-06-08 PROCEDURE — 80053 COMPREHEN METABOLIC PANEL: CPT

## 2019-06-08 PROCEDURE — 83036 HEMOGLOBIN GLYCOSYLATED A1C: CPT

## 2019-06-10 DIAGNOSIS — R73.03 PRE-DIABETES: ICD-10-CM

## 2019-06-10 DIAGNOSIS — E78.1 HYPERTRIGLYCERIDEMIA: Primary | ICD-10-CM

## 2019-06-10 DIAGNOSIS — E55.9 VITAMIN D DEFICIENCY: ICD-10-CM

## 2019-06-10 RX ORDER — ROSUVASTATIN CALCIUM 10 MG/1
10 TABLET, COATED ORAL DAILY
Qty: 30 TABLET | Refills: 5 | Status: SHIPPED | OUTPATIENT
Start: 2019-06-10 | End: 2019-09-19 | Stop reason: SDUPTHER

## 2019-06-10 RX ORDER — ACETAMINOPHEN 160 MG
1 TABLET,DISINTEGRATING ORAL DAILY
Qty: 90 CAPSULE | Refills: 3 | Status: SHIPPED | OUTPATIENT
Start: 2019-06-10

## 2019-06-13 ENCOUNTER — APPOINTMENT (OUTPATIENT)
Dept: CARDIAC CATH/INVASIVE PROCEDURES | Age: 54
End: 2019-06-13
Payer: COMMERCIAL

## 2019-06-13 ENCOUNTER — HOSPITAL ENCOUNTER (OUTPATIENT)
Age: 54
Setting detail: OBSERVATION
Discharge: HOME | End: 2019-06-13
Attending: INTERNAL MEDICINE | Admitting: INTERNAL MEDICINE
Payer: COMMERCIAL

## 2019-06-13 ENCOUNTER — APPOINTMENT (OUTPATIENT)
Dept: GENERAL RADIOLOGY | Age: 54
End: 2019-06-13
Payer: COMMERCIAL

## 2019-06-13 VITALS
HEART RATE: 85 BPM | RESPIRATION RATE: 16 BRPM | OXYGEN SATURATION: 95 % | SYSTOLIC BLOOD PRESSURE: 116 MMHG | HEIGHT: 68 IN | TEMPERATURE: 97.5 F | WEIGHT: 234 LBS | DIASTOLIC BLOOD PRESSURE: 78 MMHG | BODY MASS INDEX: 35.46 KG/M2

## 2019-06-13 DIAGNOSIS — R07.9 CHEST PAIN, UNSPECIFIED TYPE: Primary | ICD-10-CM

## 2019-06-13 PROBLEM — I20.0 UNSTABLE ANGINA (HCC): Status: ACTIVE | Noted: 2019-06-13

## 2019-06-13 LAB
ALBUMIN SERPL-MCNC: 4.5 G/DL (ref 3.5–4.6)
ALP BLD-CCNC: 65 U/L (ref 35–104)
ALT SERPL-CCNC: 31 U/L (ref 0–41)
ANION GAP SERPL CALCULATED.3IONS-SCNC: 15 MEQ/L (ref 9–15)
AST SERPL-CCNC: 28 U/L (ref 0–40)
BASOPHILS ABSOLUTE: 0.1 K/UL (ref 0–0.2)
BASOPHILS RELATIVE PERCENT: 0.8 %
BILIRUB SERPL-MCNC: 0.6 MG/DL (ref 0.2–0.7)
BILIRUBIN URINE: NEGATIVE
BLOOD, URINE: NEGATIVE
BUN BLDV-MCNC: 19 MG/DL (ref 6–20)
CALCIUM SERPL-MCNC: 9.7 MG/DL (ref 8.5–9.9)
CHLORIDE BLD-SCNC: 97 MEQ/L (ref 95–107)
CK MB: 6.8 NG/ML (ref 0–6.7)
CLARITY: CLEAR
CO2: 26 MEQ/L (ref 20–31)
COLOR: YELLOW
CREAT SERPL-MCNC: 0.87 MG/DL (ref 0.7–1.2)
CREATINE KINASE-MB INDEX: 2.4 % (ref 0–3.5)
D DIMER: <0.19 MG/L FEU (ref 0–0.5)
EKG ATRIAL RATE: 82 BPM
EKG P AXIS: -17 DEGREES
EKG P-R INTERVAL: 166 MS
EKG Q-T INTERVAL: 378 MS
EKG QRS DURATION: 100 MS
EKG QTC CALCULATION (BAZETT): 441 MS
EKG R AXIS: -20 DEGREES
EKG T AXIS: -13 DEGREES
EKG VENTRICULAR RATE: 82 BPM
EOSINOPHILS ABSOLUTE: 0.1 K/UL (ref 0–0.7)
EOSINOPHILS RELATIVE PERCENT: 1.6 %
GFR AFRICAN AMERICAN: >60
GFR NON-AFRICAN AMERICAN: >60
GLOBULIN: 2.1 G/DL (ref 2.3–3.5)
GLUCOSE BLD-MCNC: 117 MG/DL (ref 70–99)
GLUCOSE URINE: NEGATIVE MG/DL
HCT VFR BLD CALC: 45.8 % (ref 42–52)
HEMOGLOBIN: 16.1 G/DL (ref 14–18)
KETONES, URINE: NEGATIVE MG/DL
LEUKOCYTE ESTERASE, URINE: NEGATIVE
LV EF: 55 %
LVEF MODALITY: NORMAL
LYMPHOCYTES ABSOLUTE: 1.7 K/UL (ref 1–4.8)
LYMPHOCYTES RELATIVE PERCENT: 24.5 %
MAGNESIUM: 1.9 MG/DL (ref 1.7–2.4)
MCH RBC QN AUTO: 30.8 PG (ref 27–31.3)
MCHC RBC AUTO-ENTMCNC: 35 % (ref 33–37)
MCV RBC AUTO: 88.1 FL (ref 80–100)
MONOCYTES ABSOLUTE: 0.7 K/UL (ref 0.2–0.8)
MONOCYTES RELATIVE PERCENT: 10.7 %
NEUTROPHILS ABSOLUTE: 4.3 K/UL (ref 1.4–6.5)
NEUTROPHILS RELATIVE PERCENT: 62.4 %
NITRITE, URINE: NEGATIVE
PDW BLD-RTO: 14.5 % (ref 11.5–14.5)
PH UA: 5 (ref 5–9)
PLATELET # BLD: 160 K/UL (ref 130–400)
POTASSIUM SERPL-SCNC: 3.4 MEQ/L (ref 3.4–4.9)
PRO-BNP: 9 PG/ML
PROTEIN UA: NEGATIVE MG/DL
RBC # BLD: 5.2 M/UL (ref 4.7–6.1)
SODIUM BLD-SCNC: 138 MEQ/L (ref 135–144)
SPECIFIC GRAVITY UA: 1.03 (ref 1–1.03)
TOTAL CK: 280 U/L (ref 0–190)
TOTAL PROTEIN: 6.6 G/DL (ref 6.3–8)
TROPONIN: <0.01 NG/ML (ref 0–0.01)
TSH REFLEX: 2.3 UIU/ML (ref 0.44–3.86)
URINE REFLEX TO CULTURE: NORMAL
UROBILINOGEN, URINE: 0.2 E.U./DL
WBC # BLD: 6.9 K/UL (ref 4.8–10.8)

## 2019-06-13 PROCEDURE — 83880 ASSAY OF NATRIURETIC PEPTIDE: CPT

## 2019-06-13 PROCEDURE — 84484 ASSAY OF TROPONIN QUANT: CPT

## 2019-06-13 PROCEDURE — 93458 L HRT ARTERY/VENTRICLE ANGIO: CPT | Performed by: INTERNAL MEDICINE

## 2019-06-13 PROCEDURE — 93005 ELECTROCARDIOGRAM TRACING: CPT | Performed by: EMERGENCY MEDICINE

## 2019-06-13 PROCEDURE — 82553 CREATINE MB FRACTION: CPT

## 2019-06-13 PROCEDURE — 99285 EMERGENCY DEPT VISIT HI MDM: CPT

## 2019-06-13 PROCEDURE — 84443 ASSAY THYROID STIM HORMONE: CPT

## 2019-06-13 PROCEDURE — 2709999900 HC NON-CHARGEABLE SUPPLY

## 2019-06-13 PROCEDURE — 99245 OFF/OP CONSLTJ NEW/EST HI 55: CPT | Performed by: INTERNAL MEDICINE

## 2019-06-13 PROCEDURE — 80053 COMPREHEN METABOLIC PANEL: CPT

## 2019-06-13 PROCEDURE — C1894 INTRO/SHEATH, NON-LASER: HCPCS

## 2019-06-13 PROCEDURE — C1769 GUIDE WIRE: HCPCS

## 2019-06-13 PROCEDURE — C1725 CATH, TRANSLUMIN NON-LASER: HCPCS

## 2019-06-13 PROCEDURE — 82550 ASSAY OF CK (CPK): CPT

## 2019-06-13 PROCEDURE — G0378 HOSPITAL OBSERVATION PER HR: HCPCS

## 2019-06-13 PROCEDURE — 85379 FIBRIN DEGRADATION QUANT: CPT

## 2019-06-13 PROCEDURE — 6360000002 HC RX W HCPCS

## 2019-06-13 PROCEDURE — APPNB45 APP NON BILLABLE 31-45 MINUTES: Performed by: NURSE PRACTITIONER

## 2019-06-13 PROCEDURE — 93306 TTE W/DOPPLER COMPLETE: CPT

## 2019-06-13 PROCEDURE — 85025 COMPLETE CBC W/AUTO DIFF WBC: CPT

## 2019-06-13 PROCEDURE — 6370000000 HC RX 637 (ALT 250 FOR IP): Performed by: NURSE PRACTITIONER

## 2019-06-13 PROCEDURE — 83735 ASSAY OF MAGNESIUM: CPT

## 2019-06-13 PROCEDURE — 71045 X-RAY EXAM CHEST 1 VIEW: CPT

## 2019-06-13 PROCEDURE — 2580000003 HC RX 258

## 2019-06-13 PROCEDURE — 2580000003 HC RX 258: Performed by: NURSE PRACTITIONER

## 2019-06-13 PROCEDURE — 81003 URINALYSIS AUTO W/O SCOPE: CPT

## 2019-06-13 PROCEDURE — 36415 COLL VENOUS BLD VENIPUNCTURE: CPT

## 2019-06-13 PROCEDURE — 2500000003 HC RX 250 WO HCPCS

## 2019-06-13 RX ORDER — FENOFIBRATE 160 MG/1
160 TABLET ORAL DAILY
Qty: 30 TABLET | Refills: 3 | Status: SHIPPED | OUTPATIENT
Start: 2019-06-13 | End: 2019-09-19 | Stop reason: SDUPTHER

## 2019-06-13 RX ORDER — SODIUM CHLORIDE 0.9 % (FLUSH) 0.9 %
10 SYRINGE (ML) INJECTION EVERY 12 HOURS SCHEDULED
Status: DISCONTINUED | OUTPATIENT
Start: 2019-06-13 | End: 2019-06-13 | Stop reason: HOSPADM

## 2019-06-13 RX ORDER — RANOLAZINE 500 MG/1
500 TABLET, EXTENDED RELEASE ORAL 2 TIMES DAILY
Status: DISCONTINUED | OUTPATIENT
Start: 2019-06-13 | End: 2019-06-13 | Stop reason: HOSPADM

## 2019-06-13 RX ORDER — SODIUM CHLORIDE 9 MG/ML
INJECTION, SOLUTION INTRAVENOUS CONTINUOUS
Status: DISCONTINUED | OUTPATIENT
Start: 2019-06-13 | End: 2019-06-13 | Stop reason: HOSPADM

## 2019-06-13 RX ORDER — ASPIRIN 81 MG/1
324 TABLET, CHEWABLE ORAL ONCE
Status: COMPLETED | OUTPATIENT
Start: 2019-06-13 | End: 2019-06-13

## 2019-06-13 RX ORDER — SODIUM CHLORIDE 0.9 % (FLUSH) 0.9 %
10 SYRINGE (ML) INJECTION PRN
Status: DISCONTINUED | OUTPATIENT
Start: 2019-06-13 | End: 2019-06-13 | Stop reason: HOSPADM

## 2019-06-13 RX ORDER — ONDANSETRON 2 MG/ML
4 INJECTION INTRAMUSCULAR; INTRAVENOUS EVERY 6 HOURS PRN
Status: DISCONTINUED | OUTPATIENT
Start: 2019-06-13 | End: 2019-06-13 | Stop reason: HOSPADM

## 2019-06-13 RX ORDER — HYDROCHLOROTHIAZIDE 12.5 MG/1
12.5 TABLET ORAL EVERY MORNING
Status: DISCONTINUED | OUTPATIENT
Start: 2019-06-13 | End: 2019-06-13 | Stop reason: HOSPADM

## 2019-06-13 RX ORDER — ROSUVASTATIN CALCIUM 10 MG/1
10 TABLET, COATED ORAL DAILY
Status: DISCONTINUED | OUTPATIENT
Start: 2019-06-13 | End: 2019-06-13 | Stop reason: HOSPADM

## 2019-06-13 RX ORDER — FENOFIBRATE 160 MG/1
160 TABLET ORAL DAILY
Status: DISCONTINUED | OUTPATIENT
Start: 2019-06-13 | End: 2019-06-13 | Stop reason: HOSPADM

## 2019-06-13 RX ORDER — LOSARTAN POTASSIUM 25 MG/1
25 TABLET ORAL DAILY
Status: DISCONTINUED | OUTPATIENT
Start: 2019-06-13 | End: 2019-06-13 | Stop reason: HOSPADM

## 2019-06-13 RX ORDER — ACETAMINOPHEN 325 MG/1
650 TABLET ORAL EVERY 6 HOURS PRN
Status: DISCONTINUED | OUTPATIENT
Start: 2019-06-13 | End: 2019-06-13 | Stop reason: HOSPADM

## 2019-06-13 RX ORDER — NITROGLYCERIN 0.4 MG/1
0.4 TABLET SUBLINGUAL EVERY 5 MIN PRN
Status: DISCONTINUED | OUTPATIENT
Start: 2019-06-13 | End: 2019-06-13 | Stop reason: HOSPADM

## 2019-06-13 RX ORDER — ASPIRIN 81 MG/1
81 TABLET, CHEWABLE ORAL DAILY
Status: DISCONTINUED | OUTPATIENT
Start: 2019-06-14 | End: 2019-06-13 | Stop reason: HOSPADM

## 2019-06-13 RX ORDER — FEXOFENADINE HYDROCHLORIDE 60 MG/1
60 TABLET, FILM COATED ORAL DAILY
COMMUNITY
End: 2019-09-19 | Stop reason: SDUPTHER

## 2019-06-13 RX ORDER — ISOSORBIDE MONONITRATE 30 MG/1
30 TABLET, EXTENDED RELEASE ORAL DAILY
Status: DISCONTINUED | OUTPATIENT
Start: 2019-06-13 | End: 2019-06-13 | Stop reason: HOSPADM

## 2019-06-13 RX ORDER — ACETAMINOPHEN 325 MG/1
650 TABLET ORAL EVERY 4 HOURS PRN
Status: DISCONTINUED | OUTPATIENT
Start: 2019-06-13 | End: 2019-06-13 | Stop reason: HOSPADM

## 2019-06-13 RX ORDER — LOSARTAN POTASSIUM 25 MG/1
25 TABLET ORAL DAILY
Qty: 30 TABLET | Refills: 3 | Status: SHIPPED | OUTPATIENT
Start: 2019-06-14 | End: 2019-09-19 | Stop reason: SDUPTHER

## 2019-06-13 RX ADMIN — NITROGLYCERIN 0.4 MG: 0.4 TABLET, ORALLY DISINTEGRATING SUBLINGUAL at 09:37

## 2019-06-13 RX ADMIN — NITROGLYCERIN 0.5 INCH: 20 OINTMENT TOPICAL at 09:48

## 2019-06-13 RX ADMIN — ACETAMINOPHEN 650 MG: 325 TABLET ORAL at 12:30

## 2019-06-13 RX ADMIN — RANOLAZINE 500 MG: 500 TABLET, FILM COATED, EXTENDED RELEASE ORAL at 12:30

## 2019-06-13 RX ADMIN — ASPIRIN 81 MG 324 MG: 81 TABLET ORAL at 09:47

## 2019-06-13 RX ADMIN — ISOSORBIDE MONONITRATE 30 MG: 30 TABLET, EXTENDED RELEASE ORAL at 12:30

## 2019-06-13 RX ADMIN — SODIUM CHLORIDE: 9 INJECTION, SOLUTION INTRAVENOUS at 12:31

## 2019-06-13 RX ADMIN — NITROGLYCERIN 0.4 MG: 0.4 TABLET, ORALLY DISINTEGRATING SUBLINGUAL at 09:29

## 2019-06-13 ASSESSMENT — ENCOUNTER SYMPTOMS
ALLERGIC/IMMUNOLOGIC NEGATIVE: 1
RHINORRHEA: 0
VOMITING: 0
SORE THROAT: 0
STRIDOR: 0
NAUSEA: 1
ABDOMINAL PAIN: 0
EYES NEGATIVE: 1
EYE PAIN: 0
COUGH: 0
SHORTNESS OF BREATH: 1
CHEST TIGHTNESS: 1
WHEEZING: 0
BLOOD IN STOOL: 0
NAUSEA: 0
DIARRHEA: 0
CHEST TIGHTNESS: 0
CHOKING: 0
GASTROINTESTINAL NEGATIVE: 1
APNEA: 0
BACK PAIN: 0
PHOTOPHOBIA: 0

## 2019-06-13 ASSESSMENT — PAIN DESCRIPTION - LOCATION
LOCATION: CHEST
LOCATION: CHEST

## 2019-06-13 ASSESSMENT — PAIN SCALES - GENERAL
PAINLEVEL_OUTOF10: 10
PAINLEVEL_OUTOF10: 3
PAINLEVEL_OUTOF10: 0

## 2019-06-13 ASSESSMENT — HEART SCORE: ECG: 0

## 2019-06-13 ASSESSMENT — PAIN DESCRIPTION - FREQUENCY: FREQUENCY: INTERMITTENT

## 2019-06-13 ASSESSMENT — PAIN DESCRIPTION - DESCRIPTORS: DESCRIPTORS: PRESSURE

## 2019-06-13 ASSESSMENT — PAIN DESCRIPTION - PROGRESSION: CLINICAL_PROGRESSION: RESOLVED

## 2019-06-13 ASSESSMENT — PAIN DESCRIPTION - ORIENTATION: ORIENTATION: LEFT

## 2019-06-13 NOTE — ED PROVIDER NOTES
[address] [facility]  eMERGENCY dEPARTMENT eNCOUnter      Pt Name: Eric Jeong  MRN: [id number]  [place] 1965  Date of evaluation: 6/13/2019  Provider: MONIQUE Strange 6626       Chief Complaint   Patient presents with    Chest Pain     intermittent for the past week, pressure left sided         HISTORY OF PRESENT ILLNESS   (Location/Symptom, Timing/Onset,Context/Setting, Quality, Duration, Modifying Factors, Severity)  Note limiting factors. Eric Jeong is a 62 y.o. male who presents to the emergency department with complaints of left sided chest pressure that has occurred transiently over the past 2 weeks. There is associated \"abnormal feeling to breathing\" with these episodes, he feels it difficult to get air. he has no difficulty breathing. Today pt experienced episode of sudden dizziness while standing. This worsened with head movement. There was associated nausea. This episode was self limiting. he again experienced chest pressure following this episode and was sent to the ED for evaluation after calling PCP. No headache, lightheadedness, palpitations, tripathi, exertional component, diaphoresis. Location/Symptom - chest pressure  Timing/Onset - 2 weeks  Context/Setting - as above  Quality - pressure  Duration - typically brief episodes   Modifying Factors - none  Severity - moderate    Nursing Notes were reviewed. REVIEW OF SYSTEMS    (2-9 systems for level 4, 10 or more for level 5)     Review of Systems   Constitutional: Negative for chills, diaphoresis, fatigue and fever. HENT: Negative for congestion, rhinorrhea and sore throat. Eyes: Negative for photophobia and pain. Respiratory: Positive for shortness of breath. Negative for cough. Cardiovascular: Positive for chest pain. Negative for palpitations. Gastrointestinal: Negative for abdominal pain, diarrhea, nausea and vomiting. Genitourinary: Negative for dysuria and flank pain. Musculoskeletal: Negative for back pain. Skin: Negative for rash. Neurological: Positive for dizziness. Negative for light-headedness and headaches. Psychiatric/Behavioral: Negative. All other systems reviewed and are negative. Except as noted above the remainder of the review of systems was reviewed and negative.        PAST MEDICAL HISTORY     Past Medical History:   Diagnosis Date    Gastroesophageal reflux disease without esophagitis 3/22/2016    History of colon polyps 2019    History of tobacco use 2019    Hypertension     Hypertriglyceridemia 2019    Kidney stone     Pre-diabetes 6/10/2019    Presbyopia 2015    Vitamin D deficiency 6/10/2019     Past Surgical History:   Procedure Laterality Date    COLONOSCOPY  2014    polyp, 5y follow up (DR Ashleigh Molina)    HERNIA REPAIR  5250    periumbilical hernia    [place] COLON CA SCRN NOT HI RSK IND N/A 9/10/2018    polyps x 4, diverticulosis, int hemorr, 3y repeat (DR Ashleigh Molina)  COLONOSCOPY performed by Franki Fletcher MD at [address] [place] [address]     Social History     Socioeconomic History    Marital status:      Spouse name: None    Number of children: 3    Years of education: None    Highest education level: None   Occupational History    Occupation:      Comment: [employer]   Social Needs    Financial resource strain: None    Food insecurity:     Worry: None     Inability: None    Transportation needs:     Medical: None     Non-medical: None   Tobacco Use    Smoking status: Former Smoker     Packs/day: 2.00     Years: 18.00     Pack years: 36.00     Types: Cigarettes     Start date:      Last attempt to quit: 2/3/1996     Years since quittin.3    Smokeless tobacco: Never Used   Substance and Sexual Activity    Alcohol use: No     Alcohol/week: 0.0 oz     Frequency: Never    Drug use: No    Sexual activity: Yes     Partners: Female     Comment: monogamous Lifestyle    Physical activity:     Days per week: 0 days     Minutes per session: 0 min    Stress: Not at all   Relationships    Social connections:     Talks on phone: None     Gets together: None     Attends Taoism service: None     Active member of club or organization: None     Attends meetings of clubs or organizations: None     Relationship status: None    Intimate partner violence:     Fear of current or ex partner: None     Emotionally abused: None     Physically abused: None     Forced sexual activity: None   Other Topics Concern    None   Social History Narrative    Lives with wife at home        2 dogs        Hobbies: watch TV        SCREENINGS    [place] Coma Scale  Eye Opening: Spontaneous  Best Verbal Response: Oriented  Best Motor Response: Obeys commands  [place] Coma Scale Score: 15 @FLOW([id number])@      PHYSICAL EXAM    (up to 7 for level 4, 8 or more for level 5)     ED Triage Vitals   BP Temp Temp Source Pulse Resp SpO2 Height Weight   06/13/19 0855 06/13/19 0901 06/13/19 0901 06/13/19 0855 06/13/19 0855 06/13/19 0855 06/13/19 0855 06/13/19 0855   (!) 162/95 97.8 °F (36.6 °C) Oral 80 16 96 % 5' 8\" (1.727 m) 234 lb (106.1 kg)       Physical Exam   Constitutional: He is oriented to person, place, and time. He appears well-developed and well-nourished. He is active. No distress. HENT:   Head: Normocephalic and atraumatic. Mouth/Throat: Mucous membranes are normal.   Eyes: Conjunctivae and lids are normal.   Neck: Normal range of motion. Neck supple. Cardiovascular: Normal rate, regular rhythm, normal heart sounds, intact distal pulses and normal pulses. Pulmonary/Chest: Effort normal and breath sounds normal.   Abdominal: Soft. Normal appearance and bowel sounds are normal. There is no tenderness. Lymphadenopathy:     He has no cervical adenopathy. Neurological: He is alert and oriented to person, place, and time. He has normal strength. Skin: Skin is warm, dry and intact. Capillary refill takes less than 2 seconds. No rash noted. He is not diaphoretic. Psychiatric: Judgment and thought content normal.   Nursing note and vitals reviewed. DIAGNOSTIC RESULTS     EKG: All EKG's are interpreted by the Emergency Department Physician who either signs or Co-signsthis chart in the absence of a cardiologist.    nsr 82, no acute st segment elevation, pvcs    RADIOLOGY:   Non-plain filmimages such as CT, Ultrasound and MRI are read by the radiologist. Plain radiographic images are visualized and preliminarily interpreted by the emergency physician with the below findings:    Chest X-Ray demonstrates no acute focal infiltrate, effusion or pneumothorax. Interpretation per the Radiologist below, if available at the time ofthis note:    XR CHEST PORTABLE    (Results Pending)         ED BEDSIDE ULTRASOUND:   Performed by ED Physician - none    LABS:  Labs Reviewed   COMPREHENSIVE METABOLIC PANEL - Abnormal; Notable for the following components:       Result Value    Glucose 117 (*)     Globulin 2.1 (*)     All other components within normal limits   CK - Abnormal; Notable for the following components: Total  (*)     All other components within normal limits   CKMB & RELATIVE PERCENT - Abnormal; Notable for the following components:    CK-MB 6.8 (*)     All other components within normal limits   CBC WITH AUTO DIFFERENTIAL   D-DIMER, QUANTITATIVE   URINE RT REFLEX TO CULTURE   TROPONIN   MAGNESIUM   TSH WITH REFLEX       All other labs were within normal range or not returned as of this dictation. EMERGENCY DEPARTMENT COURSE and DIFFERENTIAL DIAGNOSIS/MDM:   Vitals:    Vitals:    06/13/19 0935 06/13/19 0950 06/13/19 1030 06/13/19 1124   BP:  (!) 137/91 128/84 128/75   Pulse: 95 85 82 71   Resp:   16    Temp:    97.2 °F (36.2 °C)   TempSrc:    Oral   SpO2:    97%   Weight:       Height:                MDM on exam pt is nontoxic and in no distress. VS noted.   Recently started

## 2019-06-13 NOTE — LETTER
[address] [facility]  Phone: [phone]    No name on file. June 13, 2019     Patient: Marlon Laguerre   YOB: 1965   Date of Visit: 6/13/2019       To Whom It May Concern: It is my medical opinion that Marlon Laguerre may be off work Fri June 14 and Sat Lisa 15. May return to work without restrictions on Mon June17. If you have any questions or concerns, please don't hesitate to call.     Sincerely,        Dr. Shon Grigsby RN

## 2019-06-13 NOTE — H&P
PMHx:  Past Medical History:   Diagnosis Date    Gastroesophageal reflux disease without esophagitis 3/22/2016    History of colon polyps 2019    History of tobacco use 2019    Hypertension     Hypertriglyceridemia 2019    Kidney stone     Pre-diabetes 6/10/2019    Presbyopia 2015    Vitamin D deficiency 6/10/2019       PSHx:  Past Surgical History:   Procedure Laterality Date    COLONOSCOPY  2014    polyp, 5y follow up ([facility])   [phone]    periumbilical hernia    SC COLON CA SCRN NOT HI RSK IND N/A 9/10/2018    polyps x 4, diverticulosis, int hemorr, 3y repeat ([facility])  COLONOSCOPY performed by Luciano Meraz MD at [address] Hx:     Social History     Socioeconomic History    Marital status:      Spouse name: None    Number of children: 3    Years of education: None    Highest education level: None   Occupational History    Occupation:      Comment: [employer]   Social Needs    Financial resource strain: None    Food insecurity:     Worry: None     Inability: None    Transportation needs:     Medical: None     Non-medical: None   Tobacco Use    Smoking status: Former Smoker     Packs/day: 2.00     Years: 18.00     Pack years: 36.00     Types: Cigarettes     Start date:      Last attempt to quit: 2/3/1996     Years since quittin.3    Smokeless tobacco: Never Used   Substance and Sexual Activity    Alcohol use: No     Alcohol/week: 0.0 oz     Frequency: Never    Drug use: No    Sexual activity: Yes     Partners: Female     Comment: monogamous   Lifestyle    Physical activity:     Days per week: 0 days     Minutes per session: 0 min    Stress: Not at all   Relationships    Social connections:     Talks on phone: None     Gets together: None     Attends Yarsanism service: None     Active member of club or organization: None     Attends meetings of clubs or organizations: None     Relationship status: None    Intimate partner violence:     Fear of current or ex partner: None     Emotionally abused: None     Physically abused: None     Forced sexual activity: None   Other Topics Concern    None   Social History Narrative    Lives with wife at home        2 dogs        Hobbies: watch TV        Family Hx:  Family History   Problem Relation Age of Onset    High Blood Pressure Mother     Diabetes Mother     High Cholesterol Father     Coronary Art Dis Father         CABG x 3    No Known Problems Brother     Lupus Paternal Uncle     No Known Problems Son     No Known Problems Son     No Known Problems Son        Review ofSystems:   Review of Systems   Constitutional: Positive for diaphoresis and fatigue. Negative for appetite change, chills and fever. HENT: Negative. Eyes: Negative. Respiratory: Positive for shortness of breath. Negative for apnea, cough, choking, chest tightness, wheezing and stridor. Cardiovascular: Positive for chest pain. Negative for palpitations and leg swelling. Gastrointestinal: Negative. Endocrine: Negative. Genitourinary: Negative. Musculoskeletal: Negative. Allergic/Immunologic: Negative. Neurological: Positive for dizziness and light-headedness. Hematological: Negative. Psychiatric/Behavioral: Negative. Physical Examination:    /84   Pulse 82   Temp 97.8 °F (36.6 °C) (Oral)   Resp 16   Ht 5' 8\" (1.727 m)   Wt 234 lb (106.1 kg)   SpO2 96%   BMI 35.58 kg/m²    Physical Exam   Constitutional: He is oriented to person, place, and time. He appears well-developed and well-nourished. HENT:   Head: Normocephalic. Eyes: Pupils are equal, round, and reactive to light. Neck: No JVD present. No tracheal deviation present. No thyromegaly present. Cardiovascular: Normal rate, regular rhythm, normal heart sounds and intact distal pulses. Exam reveals no gallop and no friction rub.    No murmur heard. Pulmonary/Chest: Effort normal and breath sounds normal. No stridor. No respiratory distress. He has no wheezes. He has no rales. He exhibits no tenderness. Abdominal: Soft. Bowel sounds are normal.   Musculoskeletal: Normal range of motion. He exhibits no edema or tenderness. Lymphadenopathy:     He has no cervical adenopathy. Neurological: He is alert and oriented to person, place, and time. Skin: Skin is warm and dry. Psychiatric: He has a normal mood and affect.         LABS:  CBC:   Lab Results   Component Value Date    WBC 6.9 06/13/2019    RBC 5.20 06/13/2019    HGB 16.1 06/13/2019    HCT 45.8 06/13/2019    MCV 88.1 06/13/2019    MCH 30.8 06/13/2019    MCHC 35.0 06/13/2019    RDW 14.5 06/13/2019     06/13/2019     CBC with Differential:   Lab Results   Component Value Date    WBC 6.9 06/13/2019    RBC 5.20 06/13/2019    HGB 16.1 06/13/2019    HCT 45.8 06/13/2019     06/13/2019    MCV 88.1 06/13/2019    MCH 30.8 06/13/2019    MCHC 35.0 06/13/2019    RDW 14.5 06/13/2019    LYMPHOPCT 24.5 06/13/2019    MONOPCT 10.7 06/13/2019    BASOPCT 0.8 06/13/2019    MONOSABS 0.7 06/13/2019    LYMPHSABS 1.7 06/13/2019    EOSABS 0.1 06/13/2019    BASOSABS 0.1 06/13/2019     CMP:    Lab Results   Component Value Date     06/13/2019    K 3.4 06/13/2019    CL 97 06/13/2019    CO2 26 06/13/2019    BUN 19 06/13/2019    CREATININE 0.87 06/13/2019    GFRAA >60.0 06/13/2019    LABGLOM >60.0 06/13/2019    GLUCOSE 117 06/13/2019    PROT 6.6 06/13/2019    LABALBU 4.5 06/13/2019    CALCIUM 9.7 06/13/2019    BILITOT 0.6 06/13/2019    ALKPHOS 65 06/13/2019    AST 28 06/13/2019    ALT 31 06/13/2019     BMP:    Lab Results   Component Value Date     06/13/2019    K 3.4 06/13/2019    CL 97 06/13/2019    CO2 26 06/13/2019    BUN 19 06/13/2019    LABALBU 4.5 06/13/2019    CREATININE 0.87 06/13/2019    CALCIUM 9.7 06/13/2019    GFRAA >60.0 06/13/2019    LABGLOM >60.0 06/13/2019    GLUCOSE 117 06/13/2019     Magnesium:    Lab Results   Component Value Date    MG 1.9 06/13/2019     Troponin:    Lab Results   Component Value Date    TROPONINI <0.010 06/13/2019     No results for input(s): PROBNP in the last 72 hours. No results for input(s): INR in the last 72 hours. EKG: normal sinus rhythm, nonspecific ST and T waves changes      Assessment:    Active Hospital Problems    Diagnosis Date Noted    Unstable angina ([facility] [address] [I20.0] 06/13/2019     1. Unstable angina  2. Hypertension  3. Dyslipidemia  4. Family hx cad    Plan:  1. Tele monitoring observe for any Tachy/Wilmer rhythms  2. Serial Enzymes   3. 2D echo  4. GI/DVT Prophylaxis  5. Had a long talk with the patient regarding their symptoms, test results and the different treatment options available which include cardiac cath, alternate imaging modality and medical therapy. Patient would like to proceed with cardiac catheterization and understands the risks and benefits of the procedure.         Electronically signed by MONIQUE Ortiz CNP on 6/13/2019 at 11:06 AM

## 2019-06-13 NOTE — H&P
ADDITION H&P    Chief Complaint: cp      History of Present Illness:  ~ 2 weeks intermittent Chest tightness lasting several minutes. Relieved with rest. Last couple days much worse. With resting chest tightness with SOB and Nausea. Moderately severe. No radiation. Borderline Diabetic  +FH  Marked Hyperlipidemia  Remote smoker  Works as a     Review of Systems:   Review of Systems   Constitutional: Negative. Negative for diaphoresis and fatigue. HENT: Negative. Eyes: Negative. Respiratory: Positive for chest tightness and shortness of breath. Negative for cough, wheezing and stridor. Cardiovascular: Negative. Negative for chest pain, palpitations and leg swelling. Gastrointestinal: Positive for nausea. Negative for blood in stool. Genitourinary: Negative. Musculoskeletal: Negative. Skin: Negative. Neurological: Negative. Negative for dizziness, syncope, weakness and light-headedness. Hematological: Negative. Psychiatric/Behavioral: Negative. Physical Examination:    /75   Pulse 71   Temp 97.2 °F (36.2 °C) (Oral)   Resp 16   Ht 5' 8\" (1.727 m)   Wt 234 lb (106.1 kg)   SpO2 97%   BMI 35.58 kg/m²    Physical Exam   Constitutional: He appears healthy. No distress. HENT:   Normal cephalic and Atraumatic   Eyes: Pupils are equal, round, and reactive to light. Neck: Normal range of motion and thyroid normal. Neck supple. No JVD present. No neck adenopathy. No thyromegaly present. Cardiovascular: Normal rate, regular rhythm, intact distal pulses and normal pulses. Murmur heard. Pulmonary/Chest: Effort normal and breath sounds normal. He has no wheezes. He has no rales. He exhibits no tenderness. Abdominal: Soft. Bowel sounds are normal. There is no tenderness. Musculoskeletal: Normal range of motion. He exhibits edema (trace). He exhibits no tenderness. Neurological: He is alert and oriented to person, place, and time.    Skin: Skin is

## 2019-06-13 NOTE — ED TRIAGE NOTES
Patient presents to the ER with complaints of intermittent chest pressure on the left side with exertion and at rest.  Patient also states that he has had some difficulty breathing intermittently. Lungs clear. Nausea this morning while at work with dizziness. Denies vomiting and diarrhea. A&Ox4.

## 2019-06-14 ENCOUNTER — TELEPHONE (OUTPATIENT)
Dept: FAMILY MEDICINE CLINIC | Age: 54
End: 2019-06-14

## 2019-06-14 PROCEDURE — 93010 ELECTROCARDIOGRAM REPORT: CPT | Performed by: INTERNAL MEDICINE

## 2019-06-14 NOTE — TELEPHONE ENCOUNTER
Patient wife called said her  came in on 6/6/19 to see you and had gotten a zpack, allegra and a steroid shot and it helped but now for a week he has been dealing with the same symptoms that he had then with the headache and congestion he wanted to know if he could get the z pack and the allegra again or if he should go to the ready clinic and get checked again for the symptoms. cp

## 2019-06-14 NOTE — TELEPHONE ENCOUNTER
If they feel there is a return of respiratory symptoms he should be seen by a provider in the office.   Either me if I have an opening or through the walk-in clinic

## 2019-06-15 ENCOUNTER — TELEPHONE (OUTPATIENT)
Dept: FAMILY MEDICINE CLINIC | Age: 54
End: 2019-06-15

## 2019-07-01 ENCOUNTER — OFFICE VISIT (OUTPATIENT)
Dept: FAMILY MEDICINE CLINIC | Age: 54
End: 2019-07-01
Payer: COMMERCIAL

## 2019-07-01 VITALS
SYSTOLIC BLOOD PRESSURE: 118 MMHG | OXYGEN SATURATION: 97 % | TEMPERATURE: 96.7 F | DIASTOLIC BLOOD PRESSURE: 80 MMHG | HEART RATE: 96 BPM | WEIGHT: 227.8 LBS | HEIGHT: 68 IN | BODY MASS INDEX: 34.53 KG/M2

## 2019-07-01 DIAGNOSIS — J30.2 SEASONAL ALLERGIC RHINITIS, UNSPECIFIED TRIGGER: ICD-10-CM

## 2019-07-01 DIAGNOSIS — R07.9 CHEST PAIN, UNSPECIFIED TYPE: Primary | ICD-10-CM

## 2019-07-01 DIAGNOSIS — I10 ESSENTIAL HYPERTENSION: Chronic | ICD-10-CM

## 2019-07-01 DIAGNOSIS — E78.1 HYPERTRIGLYCERIDEMIA: ICD-10-CM

## 2019-07-01 DIAGNOSIS — R73.03 PRE-DIABETES: ICD-10-CM

## 2019-07-01 DIAGNOSIS — E55.9 VITAMIN D DEFICIENCY: ICD-10-CM

## 2019-07-01 DIAGNOSIS — Z12.5 SCREENING FOR PROSTATE CANCER: ICD-10-CM

## 2019-07-01 PROCEDURE — 99214 OFFICE O/P EST MOD 30 MIN: CPT | Performed by: FAMILY MEDICINE

## 2019-07-01 PROCEDURE — 1111F DSCHRG MED/CURRENT MED MERGE: CPT | Performed by: FAMILY MEDICINE

## 2019-07-01 RX ORDER — IPRATROPIUM BROMIDE 42 UG/1
2 SPRAY, METERED NASAL 3 TIMES DAILY
Qty: 1 BOTTLE | Refills: 1 | Status: SHIPPED | OUTPATIENT
Start: 2019-07-01

## 2019-07-01 ASSESSMENT — ENCOUNTER SYMPTOMS
NAUSEA: 0
CHEST TIGHTNESS: 0
SHORTNESS OF BREATH: 0
CONSTIPATION: 0
VOMITING: 0
COUGH: 0
WHEEZING: 0
ABDOMINAL PAIN: 0
DIARRHEA: 0

## 2019-07-01 NOTE — PROGRESS NOTES
vitamin Post-Discharge Transitional Care Management Services or Hospital Follow Up      Smiley Bansal   YOB: 1965    Date of Office Visit:  7/1/2019  Date of Hospital Admission: 6/13/19  Date of Hospital Discharge: 6/13/19  Readmission Risk Score(high >=14%.  Medium >=10%):Readmission Risk Score: 0      Care management risk score Rising risk (score 2-5) and Complex Care (Scores >=6): 0     Non face to face  following discharge, date last encounter closed (first attempt may have been earlier): *No documented post hospital discharge outreach found in the last 14 days *No documented post hospital discharge outreach found in the last 14 days    Call initiated 2 business days of discharge: *No response recorded in the last 14 days     Patient Active Problem List   Diagnosis    Presbyopia    Gastroesophageal reflux disease without esophagitis    History of colon polyps    Hypertriglyceridemia    History of tobacco use    Pre-diabetes    Vitamin D deficiency    Unstable angina (HCC)    Chest pain    Essential hypertension       Allergies   Allergen Reactions    Augmentin [Amoxicillin-Pot Clavulanate] Diarrhea and Nausea And Vomiting    Codeine Nausea And Vomiting       Medications listed as ordered at the time of discharge from [facility] Medication Instructions PASHA:    Printed on:07/01/19 1640   Medication Information                      Cholecalciferol (VITAMIN D3) 2000 units CAPS  Take 1 capsule by mouth daily             fenofibrate 160 MG tablet  Take 1 tablet by mouth daily             fexofenadine (ALLEGRA ALLERGY) 60 MG tablet  Take 60 mg by mouth daily             hydrochlorothiazide (MICROZIDE) 12.5 MG capsule  Take 1 capsule by mouth every morning             ipratropium (ATROVENT) 0.06 % nasal spray  2 sprays by Nasal route 3 times daily             losartan (COZAAR) 25 MG tablet  Take 1 tablet by mouth daily             rosuvastatin (CRESTOR) 10 MG tablet  Take 1 tablet by mouth daily                   Medications marked \"taking\" at this time  Outpatient Medications Marked as Taking for the 7/1/19 encounter (Office Visit) with Kimmie Vences MD   Medication Sig Dispense Refill    ipratropium (ATROVENT) 0.06 % nasal spray 2 sprays by Nasal route 3 times daily 1 Bottle 1    fexofenadine (ALLEGRA ALLERGY) 60 MG tablet Take 60 mg by mouth daily      fenofibrate 160 MG tablet Take 1 tablet by mouth daily 30 tablet 3    losartan (COZAAR) 25 MG tablet Take 1 tablet by mouth daily 30 tablet 3    rosuvastatin (CRESTOR) 10 MG tablet Take 1 tablet by mouth daily 30 tablet 5    Cholecalciferol (VITAMIN D3) 2000 units CAPS Take 1 capsule by mouth daily 90 capsule 3    hydrochlorothiazide (MICROZIDE) 12.5 MG capsule Take 1 capsule by mouth every morning 30 capsule 5        Medications patient taking as of now reconciled against medications ordered at time of hospital discharge: Yes    Chief Complaint   Patient presents with    Follow-Up from [address] [place] [address] today for a [facility]. Patient was seen at [facility] EM[facility] AT [place] on 6/13/19 for Chest Pain       HPI    Inpatient course: Discharge summary reviewed- see chart. Interval history/Current status: Patient reports feeling improved overall since hospital discharge. He denies any further problems with chest pain/pressure, and denies any palpitations, lightheadedness, dizziness, lower extremity edema, or syncope. He denies adhering to any specific lower salt or lower carbohydrate diet. He denies any routine exercise outside of his normal day-to-day activity. He has tolerated the fenofibrate and losartan that were added during his recent hospital admission. Review of Systems   Constitutional: Negative for appetite change, chills, diaphoresis, fatigue, fever and unexpected weight change. Eyes: Negative for visual disturbance. Respiratory: Negative for cough, chest tightness, shortness of breath and wheezing. Cardiovascular: Negative for chest pain, palpitations and leg swelling. No orthopnea, No PND   Gastrointestinal: Negative for abdominal pain, constipation, diarrhea, nausea and vomiting. Musculoskeletal: Negative for myalgias. Skin: Negative for rash. Neurological: Negative for dizziness, syncope, weakness, light-headedness, numbness and headaches. Vitals:    07/01/19 1558   BP: 118/80   Site: Left Upper Arm   Position: Sitting   Cuff Size: Large Adult   Pulse: 96   Temp: 96.7 °F (35.9 °C)   TempSrc: Temporal   SpO2: 97%   Weight: 227 lb 12.8 oz (103.3 kg)   Height: 5' 8\" (1.727 m)     Body mass index is 34.64 kg/m². Wt Readings from Last 3 Encounters:   07/01/19 227 lb 12.8 oz (103.3 kg)   06/13/19 234 lb (106.1 kg)   06/06/19 230 lb 3.2 oz (104.4 kg)     BP Readings from Last 3 Encounters:   07/01/19 118/80   06/13/19 116/78   06/06/19 (!) 140/90       Physical Exam   Constitutional: He is oriented to person, place, and time. He appears well-developed and well-nourished. Neck: Neck supple. Carotid bruit is not present. No thyromegaly present. Cardiovascular: Normal rate, regular rhythm, normal heart sounds and intact distal pulses. No murmur heard. Pulmonary/Chest: Effort normal and breath sounds normal. No respiratory distress. He has no wheezes. Abdominal: Soft. Bowel sounds are normal. He exhibits no distension. There is no tenderness. There is no rebound and no guarding. Musculoskeletal: Normal range of motion. He exhibits no edema (bilateral lower extremities). Lymphadenopathy:     He has no cervical adenopathy. Neurological: He is alert and oriented to person, place, and time. Skin: Skin is warm. No rash noted. Psychiatric: He has a normal mood and affect. Assessment/Plan:  1. Chest pain, unspecified type  Resolved since hospital visit.   Patient will call cardiology office to schedule an outpatient hospital follow-up visit with the specialist    - GUCCI

## 2019-09-07 ENCOUNTER — HOSPITAL ENCOUNTER (OUTPATIENT)
Dept: LAB | Age: 54
Discharge: HOME | End: 2019-09-07
Payer: COMMERCIAL

## 2019-09-07 DIAGNOSIS — I10 ESSENTIAL HYPERTENSION: Chronic | ICD-10-CM

## 2019-09-07 DIAGNOSIS — Z12.5 SCREENING FOR PROSTATE CANCER: ICD-10-CM

## 2019-09-07 DIAGNOSIS — E55.9 VITAMIN D DEFICIENCY: ICD-10-CM

## 2019-09-07 DIAGNOSIS — E78.1 HYPERTRIGLYCERIDEMIA: ICD-10-CM

## 2019-09-07 DIAGNOSIS — R73.03 PRE-DIABETES: ICD-10-CM

## 2019-09-07 LAB
ALBUMIN SERPL-MCNC: 4.4 G/DL (ref 3.5–4.6)
ALP BLD-CCNC: 44 U/L (ref 35–104)
ALT SERPL-CCNC: 40 U/L (ref 0–41)
ANION GAP SERPL CALCULATED.3IONS-SCNC: 13 MEQ/L (ref 9–15)
AST SERPL-CCNC: 31 U/L (ref 0–40)
BILIRUB SERPL-MCNC: 0.4 MG/DL (ref 0.2–0.7)
BUN BLDV-MCNC: 18 MG/DL (ref 6–20)
CALCIUM SERPL-MCNC: 9.3 MG/DL (ref 8.5–9.9)
CHLORIDE BLD-SCNC: 104 MEQ/L (ref 95–107)
CHOLESTEROL, TOTAL: 117 MG/DL (ref 0–199)
CO2: 28 MEQ/L (ref 20–31)
CREAT SERPL-MCNC: 1.03 MG/DL (ref 0.7–1.2)
GFR AFRICAN AMERICAN: >60
GFR NON-AFRICAN AMERICAN: >60
GLOBULIN: 2.1 G/DL (ref 2.3–3.5)
GLUCOSE BLD-MCNC: 107 MG/DL (ref 70–99)
HDLC SERPL-MCNC: 35 MG/DL (ref 40–59)
LDL CHOLESTEROL CALCULATED: 67 MG/DL (ref 0–129)
POTASSIUM SERPL-SCNC: 4.4 MEQ/L (ref 3.4–4.9)
PROSTATE SPECIFIC ANTIGEN: 0.91 NG/ML (ref 0–3.89)
SODIUM BLD-SCNC: 145 MEQ/L (ref 135–144)
TOTAL PROTEIN: 6.5 G/DL (ref 6.3–8)
TRIGL SERPL-MCNC: 74 MG/DL (ref 0–150)
VITAMIN D 25-HYDROXY: 35.3 NG/ML (ref 30–100)

## 2019-09-07 PROCEDURE — 82306 VITAMIN D 25 HYDROXY: CPT

## 2019-09-07 PROCEDURE — 36415 COLL VENOUS BLD VENIPUNCTURE: CPT

## 2019-09-07 PROCEDURE — 80053 COMPREHEN METABOLIC PANEL: CPT

## 2019-09-07 PROCEDURE — 84153 ASSAY OF PSA TOTAL: CPT

## 2019-09-07 PROCEDURE — 80061 LIPID PANEL: CPT

## 2019-09-19 ENCOUNTER — OFFICE VISIT (OUTPATIENT)
Dept: FAMILY MEDICINE CLINIC | Age: 54
End: 2019-09-19
Payer: COMMERCIAL

## 2019-09-19 VITALS
TEMPERATURE: 96.8 F | WEIGHT: 225 LBS | HEIGHT: 67 IN | OXYGEN SATURATION: 98 % | DIASTOLIC BLOOD PRESSURE: 80 MMHG | BODY MASS INDEX: 35.31 KG/M2 | SYSTOLIC BLOOD PRESSURE: 118 MMHG | RESPIRATION RATE: 14 BRPM | HEART RATE: 82 BPM

## 2019-09-19 DIAGNOSIS — I10 ESSENTIAL HYPERTENSION: ICD-10-CM

## 2019-09-19 DIAGNOSIS — J30.2 SEASONAL ALLERGIC RHINITIS, UNSPECIFIED TRIGGER: ICD-10-CM

## 2019-09-19 DIAGNOSIS — R21 RASH AND OTHER NONSPECIFIC SKIN ERUPTION: ICD-10-CM

## 2019-09-19 DIAGNOSIS — Z00.00 WELL ADULT EXAM: Primary | ICD-10-CM

## 2019-09-19 DIAGNOSIS — Z23 NEED FOR VACCINATION: ICD-10-CM

## 2019-09-19 DIAGNOSIS — E78.1 HYPERTRIGLYCERIDEMIA: ICD-10-CM

## 2019-09-19 PROCEDURE — 99396 PREV VISIT EST AGE 40-64: CPT | Performed by: FAMILY MEDICINE

## 2019-09-19 RX ORDER — HYDROCHLOROTHIAZIDE 12.5 MG/1
12.5 CAPSULE, GELATIN COATED ORAL EVERY MORNING
Qty: 30 CAPSULE | Refills: 5 | Status: SHIPPED | OUTPATIENT
Start: 2019-09-19 | End: 2019-10-11 | Stop reason: SDUPTHER

## 2019-09-19 RX ORDER — FENOFIBRATE 160 MG/1
160 TABLET ORAL DAILY
Qty: 30 TABLET | Refills: 5 | Status: SHIPPED | OUTPATIENT
Start: 2019-09-19 | End: 2019-10-11 | Stop reason: SDUPTHER

## 2019-09-19 RX ORDER — FEXOFENADINE HYDROCHLORIDE 60 MG/1
60 TABLET, FILM COATED ORAL DAILY
Qty: 30 TABLET | Refills: 5 | Status: SHIPPED | OUTPATIENT
Start: 2019-09-19

## 2019-09-19 RX ORDER — LOSARTAN POTASSIUM 25 MG/1
25 TABLET ORAL DAILY
Qty: 30 TABLET | Refills: 5 | Status: SHIPPED | OUTPATIENT
Start: 2019-09-19 | End: 2019-10-11 | Stop reason: SDUPTHER

## 2019-09-19 RX ORDER — ROSUVASTATIN CALCIUM 10 MG/1
10 TABLET, COATED ORAL DAILY
Qty: 30 TABLET | Refills: 5 | Status: SHIPPED | OUTPATIENT
Start: 2019-09-19 | End: 2019-10-11 | Stop reason: SDUPTHER

## 2019-09-19 ASSESSMENT — ENCOUNTER SYMPTOMS
SHORTNESS OF BREATH: 0
RHINORRHEA: 0
CHEST TIGHTNESS: 0
ANAL BLEEDING: 0
EYE PAIN: 0
SORE THROAT: 0
BLOOD IN STOOL: 0
NAUSEA: 0
ABDOMINAL DISTENTION: 0
COLOR CHANGE: 0
PHOTOPHOBIA: 0
DIARRHEA: 0
SINUS PRESSURE: 0
CONSTIPATION: 0
VOMITING: 0
WHEEZING: 0
EYE DISCHARGE: 0
CHOKING: 0
COUGH: 0
ABDOMINAL PAIN: 0
APNEA: 0

## 2019-09-19 NOTE — PROGRESS NOTES
Transportation needs:     Medical: Not on file     Non-medical: Not on file   Tobacco Use    Smoking status: Former Smoker     Packs/day: 2.00     Years: 18.00     Pack years: 36.00     Types: Cigarettes     Start date:      Last attempt to quit: 2/3/1996     Years since quittin.6    Smokeless tobacco: Never Used   Substance and Sexual Activity    Alcohol use: No     Alcohol/week: 0.0 standard drinks     Frequency: Never    Drug use: No    Sexual activity: Yes     Partners: Female     Comment: monogamous   Lifestyle    Physical activity:     Days per week: 0 days     Minutes per session: 0 min    Stress: Not at all   Relationships    Social connections:     Talks on phone: Not on file     Gets together: Not on file     Attends Anabaptism service: Not on file     Active member of club or organization: Not on file     Attends meetings of clubs or organizations: Not on file     Relationship status: Not on file    Intimate partner violence:     Fear of current or ex partner: Not on file     Emotionally abused: Not on file     Physically abused: Not on file     Forced sexual activity: Not on file   Other Topics Concern    Not on file   Social History Narrative    Lives with wife at home        2 dogs        Hobbies: watch TV      Current Outpatient Medications on File Prior to Visit   Medication Sig Dispense Refill    ipratropium (ATROVENT) 0.06 % nasal spray 2 sprays by Nasal route 3 times daily 1 Bottle 1    Cholecalciferol (VITAMIN D3) 2000 units CAPS Take 1 capsule by mouth daily (Patient not taking: Reported on 2019) 90 capsule 3     No current facility-administered medications on file prior to visit. Allergies:  Augmentin [amoxicillin-pot clavulanate] and Codeine    Review of Systems   Constitutional: Negative for appetite change, chills, diaphoresis, fatigue, fever and unexpected weight change.    HENT: Negative for congestion, ear pain, postnasal drip, rhinorrhea, sinus pressure and sore throat. Eyes: Negative for photophobia, pain, discharge and visual disturbance. Respiratory: Negative for apnea, cough, choking, chest tightness, shortness of breath and wheezing. Cardiovascular: Negative for chest pain, palpitations and leg swelling. No orthopnea, No PND   Gastrointestinal: Negative for abdominal distention, abdominal pain, anal bleeding, blood in stool, constipation, diarrhea, nausea and vomiting. No heartburn, No melena   Endocrine: Negative for cold intolerance, heat intolerance, polydipsia, polyphagia and polyuria. Genitourinary: Negative for dysuria, flank pain, frequency, hematuria and urgency. Musculoskeletal: Negative for arthralgias, gait problem and myalgias. Skin: Negative for color change and rash. Neurological: Negative for dizziness, tremors, syncope, weakness, numbness and headaches. Hematological: Negative for adenopathy. Psychiatric/Behavioral: Negative for dysphoric mood and sleep disturbance. The patient is not nervous/anxious. Objective:     /80 (Site: Right Upper Arm, Position: Sitting, Cuff Size: Large Adult)   Pulse 82   Temp 96.8 °F (36 °C) (Temporal)   Resp 14   Ht 5' 7.25\" (1.708 m)   Wt 225 lb (102.1 kg)   SpO2 98%   BMI 34.98 kg/m²     Physical Exam   Constitutional: He is oriented to person, place, and time. He appears well-developed and well-nourished. HENT:   Head: Normocephalic and atraumatic. Right Ear: Tympanic membrane, external ear and ear canal normal.   Left Ear: Tympanic membrane, external ear and ear canal normal.   Nose: Nose normal.   Mouth/Throat: Oropharynx is clear and moist and mucous membranes are normal. No oropharyngeal exudate. Eyes: Pupils are equal, round, and reactive to light. Conjunctivae and EOM are normal. Right eye exhibits no discharge. Left eye exhibits no discharge. Neck: Normal range of motion. Neck supple. Carotid bruit is not present. No thyromegaly present.

## 2019-09-24 ENCOUNTER — TELEPHONE (OUTPATIENT)
Dept: FAMILY MEDICINE CLINIC | Age: 54
End: 2019-09-24

## 2019-09-24 DIAGNOSIS — R21 RASH AND OTHER NONSPECIFIC SKIN ERUPTION: Primary | ICD-10-CM

## 2019-09-24 NOTE — TELEPHONE ENCOUNTER
Patients wife called to ask about a steroid cream that was supposed to be sent over to [facility] in [place]. The cream was for the rash on the patients arm.

## 2019-09-26 NOTE — TELEPHONE ENCOUNTER
2nd attempt to reach patient. Called patient @ [phone]    and left message on machine for patient to return call during normal business hours of 8:30 AM and 5 PM @ [phone] option 2.

## 2019-10-10 DIAGNOSIS — R21 RASH AND OTHER NONSPECIFIC SKIN ERUPTION: ICD-10-CM

## 2019-10-10 DIAGNOSIS — E78.1 HYPERTRIGLYCERIDEMIA: ICD-10-CM

## 2019-10-10 DIAGNOSIS — I10 ESSENTIAL HYPERTENSION: ICD-10-CM

## 2019-10-11 RX ORDER — FENOFIBRATE 160 MG/1
160 TABLET ORAL DAILY
Qty: 30 TABLET | Refills: 5 | Status: SHIPPED | OUTPATIENT
Start: 2019-10-11

## 2019-10-11 RX ORDER — ROSUVASTATIN CALCIUM 10 MG/1
10 TABLET, COATED ORAL DAILY
Qty: 30 TABLET | Refills: 5 | Status: SHIPPED | OUTPATIENT
Start: 2019-10-11

## 2019-10-11 RX ORDER — HYDROCHLOROTHIAZIDE 12.5 MG/1
12.5 CAPSULE, GELATIN COATED ORAL EVERY MORNING
Qty: 30 CAPSULE | Refills: 5 | Status: SHIPPED | OUTPATIENT
Start: 2019-10-11

## 2019-10-11 RX ORDER — LOSARTAN POTASSIUM 25 MG/1
25 TABLET ORAL DAILY
Qty: 30 TABLET | Refills: 5 | Status: SHIPPED | OUTPATIENT
Start: 2019-10-11

## 2020-05-08 ENCOUNTER — TELEPHONE (OUTPATIENT)
Dept: FAMILY MEDICINE CLINIC | Age: 55
End: 2020-05-08

## 2023-03-27 ENCOUNTER — TELEPHONE (OUTPATIENT)
Dept: PRIMARY CARE | Facility: CLINIC | Age: 58
End: 2023-03-27
Payer: COMMERCIAL

## 2023-03-27 DIAGNOSIS — M19.90 GENERALIZED ARTHRITIS: Primary | ICD-10-CM

## 2023-03-27 PROBLEM — R73.03 PREDIABETES: Status: ACTIVE | Noted: 2023-03-27

## 2023-03-27 PROBLEM — R94.31 ECG ABNORMALITY: Status: ACTIVE | Noted: 2023-03-27

## 2023-03-27 PROBLEM — F41.1 GENERALIZED ANXIETY DISORDER: Status: ACTIVE | Noted: 2023-03-27

## 2023-03-27 PROBLEM — I10 HYPERTENSION: Status: ACTIVE | Noted: 2023-03-27

## 2023-03-27 PROBLEM — S62.318A: Status: ACTIVE | Noted: 2023-03-27

## 2023-03-27 PROBLEM — Z96.652 STATUS POST TOTAL LEFT KNEE REPLACEMENT: Status: ACTIVE | Noted: 2023-03-27

## 2023-03-27 PROBLEM — M17.12 PRIMARY OSTEOARTHRITIS OF LEFT KNEE: Status: ACTIVE | Noted: 2023-03-27

## 2023-03-27 PROBLEM — E78.5 HYPERLIPIDEMIA: Status: ACTIVE | Noted: 2023-03-27

## 2023-03-27 PROBLEM — M25.562 LEFT KNEE PAIN: Status: ACTIVE | Noted: 2023-03-27

## 2023-03-27 RX ORDER — ESCITALOPRAM OXALATE 10 MG/1
1 TABLET ORAL
COMMUNITY
Start: 2021-09-09 | End: 2023-03-29 | Stop reason: WASHOUT

## 2023-03-27 RX ORDER — ROSUVASTATIN CALCIUM 10 MG/1
1 TABLET, COATED ORAL
COMMUNITY
Start: 2019-10-11 | End: 2023-04-17 | Stop reason: SINTOL

## 2023-03-27 RX ORDER — HYDROCHLOROTHIAZIDE 12.5 MG/1
1 CAPSULE ORAL
COMMUNITY
Start: 2019-10-11 | End: 2023-06-21 | Stop reason: SDUPTHER

## 2023-03-27 RX ORDER — OXYCODONE AND ACETAMINOPHEN 5; 325 MG/1; MG/1
1 TABLET ORAL EVERY 6 HOURS
COMMUNITY
Start: 2022-09-19 | End: 2023-03-29 | Stop reason: WASHOUT

## 2023-03-27 RX ORDER — FENOFIBRATE 160 MG/1
1 TABLET ORAL
COMMUNITY
Start: 2019-09-19 | End: 2023-06-21 | Stop reason: SDUPTHER

## 2023-03-27 RX ORDER — LOSARTAN POTASSIUM 25 MG/1
1 TABLET ORAL
COMMUNITY
Start: 2019-09-19 | End: 2023-06-21 | Stop reason: SDUPTHER

## 2023-03-29 ENCOUNTER — OFFICE VISIT (OUTPATIENT)
Dept: PRIMARY CARE | Facility: CLINIC | Age: 58
End: 2023-03-29
Payer: COMMERCIAL

## 2023-03-29 VITALS
TEMPERATURE: 97.8 F | BODY MASS INDEX: 35.68 KG/M2 | HEIGHT: 68 IN | SYSTOLIC BLOOD PRESSURE: 126 MMHG | HEART RATE: 82 BPM | DIASTOLIC BLOOD PRESSURE: 80 MMHG | RESPIRATION RATE: 12 BRPM | OXYGEN SATURATION: 93 % | WEIGHT: 235.4 LBS

## 2023-03-29 DIAGNOSIS — E78.2 MIXED HYPERLIPIDEMIA: ICD-10-CM

## 2023-03-29 DIAGNOSIS — I10 PRIMARY HYPERTENSION: ICD-10-CM

## 2023-03-29 DIAGNOSIS — M19.90 GENERALIZED ARTHRITIS: Primary | ICD-10-CM

## 2023-03-29 PROCEDURE — 3079F DIAST BP 80-89 MM HG: CPT | Performed by: FAMILY MEDICINE

## 2023-03-29 PROCEDURE — 99214 OFFICE O/P EST MOD 30 MIN: CPT | Performed by: FAMILY MEDICINE

## 2023-03-29 PROCEDURE — 3074F SYST BP LT 130 MM HG: CPT | Performed by: FAMILY MEDICINE

## 2023-03-29 PROCEDURE — 1036F TOBACCO NON-USER: CPT | Performed by: FAMILY MEDICINE

## 2023-03-29 RX ORDER — CELECOXIB 200 MG/1
200 CAPSULE ORAL DAILY
Qty: 30 CAPSULE | Refills: 2 | Status: SHIPPED | OUTPATIENT
Start: 2023-03-29 | End: 2023-08-29 | Stop reason: SINTOL

## 2023-03-29 NOTE — PATIENT INSTRUCTIONS
We will have the patient's stop taking rosuvastatin for 2 weeks to see if that helps with the joint pain and stiffness.  We will also have him start taking celecoxib 200 mg with breakfast or just after breakfast to see if we can deal with the same symptoms.

## 2023-03-29 NOTE — PROGRESS NOTES
"Subjective   Patient ID: Charlie Massey is a 57 y.o. male who presents for Arthritis    Arthritis: Arthritis limits patient's movement. Patient states he is having pain all over. Patient states he is having a hard time moving and getting around. He states he  feels like his arthritis is increasingly getting worse. It is starting to make his job difficult. He is not currently on any medication for arthritis.  Hypertension, reports no side effects to prescribed medication. The patient reports good compliance with the medication. The patient is trying to follow a low-salt diet.  Hyperlipidemia: Reports taking rosuvastatin as advised and with supple side effects.  The patient is reporting muscle and joint achiness, this may be related to working all day on concrete.  The patient is trying to follow a low-fat diet.  /80   Pulse 82   Temp 36.6 °C (97.8 °F)   Resp 12   Ht 1.727 m (5' 8\")   Wt 107 kg (235 lb 6.4 oz)   SpO2 93%   BMI 35.79 kg/m²     Objective   Physical Exam  Vitals reviewed.   Constitutional:       Appearance: Normal appearance.   Cardiovascular:      Rate and Rhythm: Normal rate and regular rhythm.      Pulses: Normal pulses.      Heart sounds: Normal heart sounds.   Pulmonary:      Effort: Pulmonary effort is normal.      Breath sounds: Normal breath sounds.   Abdominal:      General: Abdomen is flat.      Palpations: Abdomen is soft.   Musculoskeletal:      Cervical back: Normal range of motion and neck supple.   Neurological:      Mental Status: He is alert.   No recent labs to review.    Arthritis is poorly controlled, therapeutic changes necessary.  Hypertension is well controlled, continue with current medications.  Hyperlipidemia is clinically stable so we will continue with current medications and lab work to confirm status ordered.    This patient will follow-up in one month, with lab work to be drawn prior. Other follow-up will be arranged if needed.      "
Patient/Mother

## 2023-04-13 ENCOUNTER — LAB (OUTPATIENT)
Dept: LAB | Facility: LAB | Age: 58
End: 2023-04-13
Payer: COMMERCIAL

## 2023-04-13 DIAGNOSIS — I10 PRIMARY HYPERTENSION: ICD-10-CM

## 2023-04-13 DIAGNOSIS — E78.2 MIXED HYPERLIPIDEMIA: ICD-10-CM

## 2023-04-13 LAB
ALANINE AMINOTRANSFERASE (SGPT) (U/L) IN SER/PLAS: 37 U/L (ref 10–52)
ALBUMIN (G/DL) IN SER/PLAS: 4.5 G/DL (ref 3.4–5)
ALKALINE PHOSPHATASE (U/L) IN SER/PLAS: 49 U/L (ref 33–136)
ANION GAP IN SER/PLAS: 14 MMOL/L (ref 10–20)
ASPARTATE AMINOTRANSFERASE (SGOT) (U/L) IN SER/PLAS: 26 U/L (ref 9–39)
BASOPHILS (10*3/UL) IN BLOOD BY AUTOMATED COUNT: 0.06 X10E9/L (ref 0–0.1)
BASOPHILS/100 LEUKOCYTES IN BLOOD BY AUTOMATED COUNT: 0.9 % (ref 0–2)
BILIRUBIN TOTAL (MG/DL) IN SER/PLAS: 0.7 MG/DL (ref 0–1.2)
CALCIUM (MG/DL) IN SER/PLAS: 10.3 MG/DL (ref 8.6–10.3)
CARBON DIOXIDE, TOTAL (MMOL/L) IN SER/PLAS: 29 MMOL/L (ref 21–32)
CHLORIDE (MMOL/L) IN SER/PLAS: 103 MMOL/L (ref 98–107)
CHOLESTEROL (MG/DL) IN SER/PLAS: 167 MG/DL (ref 0–199)
CHOLESTEROL IN HDL (MG/DL) IN SER/PLAS: 32.5 MG/DL
CHOLESTEROL/HDL RATIO: 5.1
CREATININE (MG/DL) IN SER/PLAS: 1.17 MG/DL (ref 0.5–1.3)
EOSINOPHILS (10*3/UL) IN BLOOD BY AUTOMATED COUNT: 0.14 X10E9/L (ref 0–0.7)
EOSINOPHILS/100 LEUKOCYTES IN BLOOD BY AUTOMATED COUNT: 2 % (ref 0–6)
ERYTHROCYTE DISTRIBUTION WIDTH (RATIO) BY AUTOMATED COUNT: 14.4 % (ref 11.5–14.5)
ERYTHROCYTE MEAN CORPUSCULAR HEMOGLOBIN CONCENTRATION (G/DL) BY AUTOMATED: 32.1 G/DL (ref 32–36)
ERYTHROCYTE MEAN CORPUSCULAR VOLUME (FL) BY AUTOMATED COUNT: 90 FL (ref 80–100)
ERYTHROCYTES (10*6/UL) IN BLOOD BY AUTOMATED COUNT: 5.16 X10E12/L (ref 4.5–5.9)
GFR MALE: 71 ML/MIN/1.73M2
GLUCOSE (MG/DL) IN SER/PLAS: 153 MG/DL (ref 74–99)
HEMATOCRIT (%) IN BLOOD BY AUTOMATED COUNT: 46.4 % (ref 41–52)
HEMOGLOBIN (G/DL) IN BLOOD: 14.9 G/DL (ref 13.5–17.5)
IMMATURE GRANULOCYTES/100 LEUKOCYTES IN BLOOD BY AUTOMATED COUNT: 0.3 % (ref 0–0.9)
LDL: 103 MG/DL (ref 0–99)
LEUKOCYTES (10*3/UL) IN BLOOD BY AUTOMATED COUNT: 6.9 X10E9/L (ref 4.4–11.3)
LYMPHOCYTES (10*3/UL) IN BLOOD BY AUTOMATED COUNT: 1.7 X10E9/L (ref 1.2–4.8)
LYMPHOCYTES/100 LEUKOCYTES IN BLOOD BY AUTOMATED COUNT: 24.8 % (ref 13–44)
MAGNESIUM (MG/DL) IN SER/PLAS: 1.73 MG/DL (ref 1.6–2.4)
MONOCYTES (10*3/UL) IN BLOOD BY AUTOMATED COUNT: 0.64 X10E9/L (ref 0.1–1)
MONOCYTES/100 LEUKOCYTES IN BLOOD BY AUTOMATED COUNT: 9.3 % (ref 2–10)
NEUTROPHILS (10*3/UL) IN BLOOD BY AUTOMATED COUNT: 4.29 X10E9/L (ref 1.2–7.7)
NEUTROPHILS/100 LEUKOCYTES IN BLOOD BY AUTOMATED COUNT: 62.7 % (ref 40–80)
PLATELETS (10*3/UL) IN BLOOD AUTOMATED COUNT: 207 X10E9/L (ref 150–450)
POTASSIUM (MMOL/L) IN SER/PLAS: 4.1 MMOL/L (ref 3.5–5.3)
PROTEIN TOTAL: 6.9 G/DL (ref 6.4–8.2)
SODIUM (MMOL/L) IN SER/PLAS: 142 MMOL/L (ref 136–145)
TRIGLYCERIDE (MG/DL) IN SER/PLAS: 160 MG/DL (ref 0–149)
UREA NITROGEN (MG/DL) IN SER/PLAS: 23 MG/DL (ref 6–23)
VLDL: 32 MG/DL (ref 0–40)

## 2023-04-13 PROCEDURE — 80053 COMPREHEN METABOLIC PANEL: CPT

## 2023-04-13 PROCEDURE — 83735 ASSAY OF MAGNESIUM: CPT

## 2023-04-13 PROCEDURE — 80061 LIPID PANEL: CPT

## 2023-04-13 PROCEDURE — 36415 COLL VENOUS BLD VENIPUNCTURE: CPT

## 2023-04-13 PROCEDURE — 85025 COMPLETE CBC W/AUTO DIFF WBC: CPT

## 2023-04-17 ENCOUNTER — OFFICE VISIT (OUTPATIENT)
Dept: PRIMARY CARE | Facility: CLINIC | Age: 58
End: 2023-04-17
Payer: COMMERCIAL

## 2023-04-17 VITALS
DIASTOLIC BLOOD PRESSURE: 80 MMHG | OXYGEN SATURATION: 97 % | TEMPERATURE: 98.7 F | BODY MASS INDEX: 35.89 KG/M2 | HEART RATE: 77 BPM | WEIGHT: 236.8 LBS | SYSTOLIC BLOOD PRESSURE: 130 MMHG | HEIGHT: 68 IN

## 2023-04-17 DIAGNOSIS — I10 PRIMARY HYPERTENSION: Primary | ICD-10-CM

## 2023-04-17 DIAGNOSIS — M19.90 GENERALIZED ARTHRITIS: ICD-10-CM

## 2023-04-17 DIAGNOSIS — E78.2 MIXED HYPERLIPIDEMIA: ICD-10-CM

## 2023-04-17 DIAGNOSIS — R73.03 PREDIABETES: ICD-10-CM

## 2023-04-17 PROCEDURE — 1036F TOBACCO NON-USER: CPT | Performed by: FAMILY MEDICINE

## 2023-04-17 PROCEDURE — 3075F SYST BP GE 130 - 139MM HG: CPT | Performed by: FAMILY MEDICINE

## 2023-04-17 PROCEDURE — 99214 OFFICE O/P EST MOD 30 MIN: CPT | Performed by: FAMILY MEDICINE

## 2023-04-17 PROCEDURE — 3079F DIAST BP 80-89 MM HG: CPT | Performed by: FAMILY MEDICINE

## 2023-04-17 PROCEDURE — 3008F BODY MASS INDEX DOCD: CPT | Performed by: FAMILY MEDICINE

## 2023-04-17 NOTE — PROGRESS NOTES
"Subjective   Patient ID: Charlie Massey is a 58 y.o. male patient who presents today for       Past Medical, Surgical, and Family History reviewed and updated in chart.     Reviewed all medications by prescribing practitioner or clinical pharmacist (such as prescriptions, OTCs, herbal therapies and supplements) and documented in the medical record.     Hypertension: The patient is here for follow-up of elevated blood pressure and denies having any side effects to prescribed medication. She is in good compliance with her medication. The patient is trying to follow a low-salt diet.  She is adherent to a low salt diet. Blood pressure is well controlled at home. Patient reports no side effects to antihypertensive medication. No new myalgias or GI upset on **    Hyperlipidemia: Reports taking medication as advised and without any side effects. The patient is denies leg cramping in particular. The patient is trying to follow a low-fat diet.    Arthritis: The patient states that arthritis limits his ambulation and ROM. He is in compliance with his medication which are keeping his pain levels at ease.        The patient denies SOB, dizziness, headaches, nausea, vomiting, constipation, chest pain, chest pressure.      Patient Care Team:  Yuan Ortega MD as PCP - General    Objective     /80   Pulse 77   Temp 37.1 °C (98.7 °F)   Ht 1.727 m (5' 8\")   Wt 107 kg (236 lb 12.8 oz)   SpO2 97%   BMI 36.01 kg/m²     Physical Exam  Vitals reviewed.   Constitutional:       Appearance: Normal appearance.   Neck:      Vascular: No carotid bruit.   Cardiovascular:      Rate and Rhythm: Normal rate and regular rhythm.      Pulses: Normal pulses.      Heart sounds: Normal heart sounds.   Pulmonary:      Effort: Pulmonary effort is normal. No respiratory distress.      Breath sounds: Normal breath sounds. No wheezing.   Abdominal:      General: There is no distension.      Palpations: Abdomen is soft. There is no mass.      " Tenderness: There is no abdominal tenderness. There is no right CVA tenderness, left CVA tenderness, guarding or rebound.   Musculoskeletal:      Cervical back: Normal range of motion and neck supple. No rigidity.      Right lower leg: No edema.      Left lower leg: No edema.   Lymphadenopathy:      Cervical: No cervical adenopathy.   Neurological:      Mental Status: He is alert.         Labs reviewed from:  Below are the patient's most recent values for Albumin, ALT, AST, BUN, Calcium, Chloride, Cholesterol, CO2, Creatinine, GFR, Glucose, HDL, Hematocrit, Hemoglobin, Hemoglobin A1C, LDL, Magnesium, Phosphorus, Platelets, Potassium, PSA, Sodium, Triglycerides, and WBC, are WNL.   Lab Results   Component Value Date    ALBUMIN 4.5 04/13/2023    ALT 37 04/13/2023    AST 26 04/13/2023    BUN 23 04/13/2023    CALCIUM 10.3 04/13/2023     04/13/2023    CHOL 167 04/13/2023    CO2 29 04/13/2023    CREATININE 1.17 04/13/2023    HDL 32.5 (A) 04/13/2023    HCT 46.4 04/13/2023    HGB 14.9 04/13/2023    HGBA1C 6.0 (A) 08/29/2022    MG 1.73 04/13/2023     04/13/2023    K 4.1 04/13/2023     04/13/2023    TRIG 160 (H) 04/13/2023    WBC 6.9 04/13/2023         Assessment/Plan   Problem List Items Addressed This Visit          Circulatory    Hypertension - Primary    Current Assessment & Plan      Is well controlled, continue with current medications.             Musculoskeletal    Generalized arthritis    Current Assessment & Plan      Is stable, continue with current treatment.              Endocrine/Metabolic    Prediabetes    Current Assessment & Plan     Is clinically stable so we will continue with current medications and lab work to confirm status ordered.             Other    Hyperlipidemia    Current Assessment & Plan      Is well controlled, continue with current medications.              Follow up in:  4 months with labs prior    Scribe Attestation  By signing my name below, I, Hill Kendrick    attest that this documentation has been prepared under the direction and in the presence of Yuan Ortega MD.

## 2023-06-21 DIAGNOSIS — E78.2 MIXED HYPERLIPIDEMIA: ICD-10-CM

## 2023-06-21 DIAGNOSIS — I10 PRIMARY HYPERTENSION: ICD-10-CM

## 2023-06-21 RX ORDER — FENOFIBRATE 160 MG/1
160 TABLET ORAL
Qty: 90 TABLET | Refills: 0 | Status: SHIPPED | OUTPATIENT
Start: 2023-06-21 | End: 2024-02-02 | Stop reason: SDUPTHER

## 2023-06-21 RX ORDER — HYDROCHLOROTHIAZIDE 12.5 MG/1
12.5 CAPSULE ORAL
Qty: 90 CAPSULE | Refills: 0 | Status: SHIPPED | OUTPATIENT
Start: 2023-06-21 | End: 2024-02-02 | Stop reason: SDUPTHER

## 2023-06-21 RX ORDER — LOSARTAN POTASSIUM 25 MG/1
25 TABLET ORAL
Qty: 90 TABLET | Refills: 0 | Status: SHIPPED | OUTPATIENT
Start: 2023-06-21 | End: 2024-02-02 | Stop reason: SDUPTHER

## 2023-06-21 NOTE — TELEPHONE ENCOUNTER
Rx Refill Request Telephone Encounter    Name:  Charlie Massey  :  [id number]  Medication Name:  losartan (Cozaar) 25 mg hydroCHLOROthiazide (Microzide) 12.5 mg   fenofibrate (Triglide) 160 mg   Specific Pharmacy location:  [facility] mail [facility]  Date of last appointment:    Date of next appointment:    Best number to reach patient:  [phone]

## 2023-08-17 ENCOUNTER — APPOINTMENT (OUTPATIENT)
Dept: PRIMARY CARE | Facility: CLINIC | Age: 58
End: 2023-08-17
Payer: COMMERCIAL

## 2023-08-28 ENCOUNTER — LAB (OUTPATIENT)
Dept: LAB | Facility: LAB | Age: 58
End: 2023-08-28
Payer: COMMERCIAL

## 2023-08-28 DIAGNOSIS — R73.03 PREDIABETES: ICD-10-CM

## 2023-08-28 DIAGNOSIS — I10 PRIMARY HYPERTENSION: ICD-10-CM

## 2023-08-28 DIAGNOSIS — E78.2 MIXED HYPERLIPIDEMIA: ICD-10-CM

## 2023-08-28 LAB
ALANINE AMINOTRANSFERASE (SGPT) (U/L) IN SER/PLAS: 44 U/L (ref 10–52)
ALBUMIN (G/DL) IN SER/PLAS: 4.5 G/DL (ref 3.4–5)
ALKALINE PHOSPHATASE (U/L) IN SER/PLAS: 53 U/L (ref 33–136)
ANION GAP IN SER/PLAS: 13 MMOL/L (ref 10–20)
ASPARTATE AMINOTRANSFERASE (SGOT) (U/L) IN SER/PLAS: 33 U/L (ref 9–39)
BASOPHILS (10*3/UL) IN BLOOD BY AUTOMATED COUNT: 0.05 X10E9/L (ref 0–0.1)
BASOPHILS/100 LEUKOCYTES IN BLOOD BY AUTOMATED COUNT: 0.9 % (ref 0–2)
BILIRUBIN TOTAL (MG/DL) IN SER/PLAS: 0.7 MG/DL (ref 0–1.2)
CALCIUM (MG/DL) IN SER/PLAS: 9.8 MG/DL (ref 8.6–10.3)
CARBON DIOXIDE, TOTAL (MMOL/L) IN SER/PLAS: 31 MMOL/L (ref 21–32)
CHLORIDE (MMOL/L) IN SER/PLAS: 104 MMOL/L (ref 98–107)
CHOLESTEROL (MG/DL) IN SER/PLAS: 180 MG/DL (ref 0–199)
CHOLESTEROL IN HDL (MG/DL) IN SER/PLAS: 32.6 MG/DL
CHOLESTEROL/HDL RATIO: 5.5
CREATININE (MG/DL) IN SER/PLAS: 0.89 MG/DL (ref 0.5–1.3)
EOSINOPHILS (10*3/UL) IN BLOOD BY AUTOMATED COUNT: 0.18 X10E9/L (ref 0–0.7)
EOSINOPHILS/100 LEUKOCYTES IN BLOOD BY AUTOMATED COUNT: 3.4 % (ref 0–6)
ERYTHROCYTE DISTRIBUTION WIDTH (RATIO) BY AUTOMATED COUNT: 14.2 % (ref 11.5–14.5)
ERYTHROCYTE MEAN CORPUSCULAR HEMOGLOBIN CONCENTRATION (G/DL) BY AUTOMATED: 31.8 G/DL (ref 32–36)
ERYTHROCYTE MEAN CORPUSCULAR VOLUME (FL) BY AUTOMATED COUNT: 92 FL (ref 80–100)
ERYTHROCYTES (10*6/UL) IN BLOOD BY AUTOMATED COUNT: 5.38 X10E12/L (ref 4.5–5.9)
ESTIMATED AVERAGE GLUCOSE FOR HBA1C: 128 MG/DL
GFR MALE: >90 ML/MIN/1.73M2
GLUCOSE (MG/DL) IN SER/PLAS: 115 MG/DL (ref 74–99)
HEMATOCRIT (%) IN BLOOD BY AUTOMATED COUNT: 49.7 % (ref 41–52)
HEMOGLOBIN (G/DL) IN BLOOD: 15.8 G/DL (ref 13.5–17.5)
HEMOGLOBIN A1C/HEMOGLOBIN TOTAL IN BLOOD: 6.1 %
IMMATURE GRANULOCYTES/100 LEUKOCYTES IN BLOOD BY AUTOMATED COUNT: 0.2 % (ref 0–0.9)
LDL: 114 MG/DL (ref 0–99)
LEUKOCYTES (10*3/UL) IN BLOOD BY AUTOMATED COUNT: 5.4 X10E9/L (ref 4.4–11.3)
LYMPHOCYTES (10*3/UL) IN BLOOD BY AUTOMATED COUNT: 1.38 X10E9/L (ref 1.2–4.8)
LYMPHOCYTES/100 LEUKOCYTES IN BLOOD BY AUTOMATED COUNT: 25.7 % (ref 13–44)
MONOCYTES (10*3/UL) IN BLOOD BY AUTOMATED COUNT: 0.51 X10E9/L (ref 0.1–1)
MONOCYTES/100 LEUKOCYTES IN BLOOD BY AUTOMATED COUNT: 9.5 % (ref 2–10)
NEUTROPHILS (10*3/UL) IN BLOOD BY AUTOMATED COUNT: 3.24 X10E9/L (ref 1.2–7.7)
NEUTROPHILS/100 LEUKOCYTES IN BLOOD BY AUTOMATED COUNT: 60.3 % (ref 40–80)
PLATELETS (10*3/UL) IN BLOOD AUTOMATED COUNT: 187 X10E9/L (ref 150–450)
POTASSIUM (MMOL/L) IN SER/PLAS: 4.8 MMOL/L (ref 3.5–5.3)
PROTEIN TOTAL: 6.5 G/DL (ref 6.4–8.2)
SODIUM (MMOL/L) IN SER/PLAS: 143 MMOL/L (ref 136–145)
TRIGLYCERIDE (MG/DL) IN SER/PLAS: 168 MG/DL (ref 0–149)
UREA NITROGEN (MG/DL) IN SER/PLAS: 18 MG/DL (ref 6–23)
VLDL: 34 MG/DL (ref 0–40)

## 2023-08-28 PROCEDURE — 80053 COMPREHEN METABOLIC PANEL: CPT

## 2023-08-28 PROCEDURE — 80061 LIPID PANEL: CPT

## 2023-08-28 PROCEDURE — 83036 HEMOGLOBIN GLYCOSYLATED A1C: CPT

## 2023-08-28 PROCEDURE — 85025 COMPLETE CBC W/AUTO DIFF WBC: CPT

## 2023-08-28 PROCEDURE — 36415 COLL VENOUS BLD VENIPUNCTURE: CPT

## 2023-08-29 ENCOUNTER — OFFICE VISIT (OUTPATIENT)
Dept: PRIMARY CARE | Facility: CLINIC | Age: 58
End: 2023-08-29
Payer: COMMERCIAL

## 2023-08-29 VITALS
RESPIRATION RATE: 14 BRPM | HEART RATE: 60 BPM | SYSTOLIC BLOOD PRESSURE: 122 MMHG | OXYGEN SATURATION: 93 % | DIASTOLIC BLOOD PRESSURE: 78 MMHG | TEMPERATURE: 97.8 F | HEIGHT: 68 IN | BODY MASS INDEX: 35.19 KG/M2 | WEIGHT: 232.2 LBS

## 2023-08-29 DIAGNOSIS — Z12.11 ENCOUNTER FOR SCREENING FOR MALIGNANT NEOPLASM OF COLON: ICD-10-CM

## 2023-08-29 DIAGNOSIS — E66.09 CLASS 2 OBESITY DUE TO EXCESS CALORIES WITHOUT SERIOUS COMORBIDITY WITH BODY MASS INDEX (BMI) OF 35.0 TO 35.9 IN ADULT: ICD-10-CM

## 2023-08-29 DIAGNOSIS — M19.90 GENERALIZED ARTHRITIS: ICD-10-CM

## 2023-08-29 DIAGNOSIS — R73.03 PRE-DIABETES: ICD-10-CM

## 2023-08-29 DIAGNOSIS — E78.2 MIXED HYPERLIPIDEMIA: ICD-10-CM

## 2023-08-29 DIAGNOSIS — F33.41 RECURRENT MAJOR DEPRESSIVE DISORDER, IN PARTIAL REMISSION (CMS-HCC): ICD-10-CM

## 2023-08-29 DIAGNOSIS — I10 PRIMARY HYPERTENSION: Primary | ICD-10-CM

## 2023-08-29 DIAGNOSIS — F41.1 GENERALIZED ANXIETY DISORDER: ICD-10-CM

## 2023-08-29 PROBLEM — E66.812 CLASS 2 OBESITY DUE TO EXCESS CALORIES WITHOUT SERIOUS COMORBIDITY WITH BODY MASS INDEX (BMI) OF 35.0 TO 35.9 IN ADULT: Status: ACTIVE | Noted: 2023-08-29

## 2023-08-29 PROCEDURE — 3078F DIAST BP <80 MM HG: CPT | Performed by: FAMILY MEDICINE

## 2023-08-29 PROCEDURE — 3074F SYST BP LT 130 MM HG: CPT | Performed by: FAMILY MEDICINE

## 2023-08-29 PROCEDURE — 99214 OFFICE O/P EST MOD 30 MIN: CPT | Performed by: FAMILY MEDICINE

## 2023-08-29 PROCEDURE — 1036F TOBACCO NON-USER: CPT | Performed by: FAMILY MEDICINE

## 2023-08-29 PROCEDURE — 3008F BODY MASS INDEX DOCD: CPT | Performed by: FAMILY MEDICINE

## 2023-08-29 RX ORDER — BUSPIRONE HYDROCHLORIDE 10 MG/1
10 TABLET ORAL 3 TIMES DAILY PRN
Qty: 90 TABLET | Refills: 3 | Status: SHIPPED | OUTPATIENT
Start: 2023-08-29 | End: 2024-02-02 | Stop reason: WASHOUT

## 2023-08-29 NOTE — ASSESSMENT & PLAN NOTE
This condition is poorly controlled, therapeutic changes necessary. The patient is continuously working on diet and exercise. The patient will continue working on strategies to maintain weight loss and stay well hydrated.

## 2023-08-29 NOTE — PROGRESS NOTES
Subjective    Patient ID: Charlie Massey is a 58 y.o. male who presents for Prediabetes, Hypertension, Hyperlipidemia, Anxiety, Arthritis, and Medication Problem (Patient would like to discuss celecoxib).     Hypertension: The patient is here for follow-up of elevated blood pressure. He is adherent to a low salt diet.  The patient is compliant with medications. Patient denies any side effects to the antihypertensive medications. Blood pressure is well controlled at home.No new myalgias or GI upset on diet control.    Hyperlipidemia: The patient is present today for a follow up of hyperlipidemia. He denies having any leg cramping in particular. He is trying to follow a low-fat diet. The patient is compliant with medications, which they are currently taking the following None indicated unless lipids or risk profile worsen in the future.. Patient denies any side effects to the medications.     Pre-diabetes: Patient is present today for a follow up of pre-diabetes. Their HgA1c score is at 6.1 %.     Anxiety:  The patient is presenting today for a follow up of anxiety disorder. He denies having any current suicidal and/or homicidal ideation.  Patient denies any side effects to the medications.     Arthritis: The patient is present today for a follow up of arthritis. The patient states that their pain is located in multiple joints. The patient reports that ibuprofen upsets his stomach and that Tylenol does not.     Depression: Patient is presenting today for a follow up of depression. He voices that they are doing well. The patient is compliant with medications. Patient denies any side effects to the medications.     Obesity: The patient is present for a follow up for obesity. The patient is continuously working on diet and exercise. The patient will continue working on strategies to maintain weight loss and stay well hydrated.       The patient denies having the following symptoms: chest pain, chest pressure, fever,  "chills, N/V/D, constipation, dizziness, headaches, SOB.      Objective   Vitals:  /78   Pulse 60   Temp 36.6 °C (97.8 °F)   Resp 14   Ht 1.727 m (5' 8\")   Wt 105 kg (232 lb 3.2 oz)   SpO2 93%   BMI 35.31 kg/m²     Physical Exam  Vitals reviewed.   Constitutional:       Appearance: Normal appearance. He is obese.   Neck:      Vascular: No carotid bruit.   Cardiovascular:      Rate and Rhythm: Normal rate and regular rhythm.      Pulses: Normal pulses.      Heart sounds: Normal heart sounds.   Pulmonary:      Effort: Pulmonary effort is normal. No respiratory distress.      Breath sounds: Normal breath sounds. No wheezing.   Abdominal:      General: There is no distension.      Palpations: Abdomen is soft. There is no mass.      Tenderness: There is no abdominal tenderness. There is no right CVA tenderness, left CVA tenderness, guarding or rebound.      Comments: Small amount of tissue around the unmalicious.    Musculoskeletal:      Cervical back: Normal range of motion and neck supple. No rigidity.      Right lower leg: No edema.      Left lower leg: No edema.   Lymphadenopathy:      Cervical: No cervical adenopathy.   Neurological:      Mental Status: He is alert.            Labs reviewed from : 08/28/2023 CMP, CBC, Lipid, HgA1C 6.1 % WNL.       Assessment/Plan   Problem List Items Addressed This Visit       Generalized anxiety disorder      This condition is poorly controlled, therapeutic changes necessary.          Relevant Medications    busPIRone (Buspar) 10 mg tablet    Other Relevant Orders    Follow Up In [facility] - PCP - Established    Generalized arthritis      Is stable, continue with current treatment.          Hyperlipidemia      Is stable, continue with current treatment.          Relevant Orders    Lipid Panel    Hypertension - Primary      Is stable, continue with current treatment.          Relevant Orders    CBC and Auto Differential    Comprehensive Metabolic Panel    " Pre-diabetes      Is stable, continue with current treatment.          Relevant Orders    Hemoglobin A1C    Albumin , Urine Random    Recurrent major depressive disorder, in partial remission (CMS/HCC)      Is stable, continue with current treatment.          Class 2 obesity due to excess calories without serious comorbidity with body mass index (BMI) of 35.0 to 35.9 in adult      This condition is poorly controlled, therapeutic changes necessary. The patient is continuously working on diet and exercise. The patient will continue working on strategies to maintain weight loss and stay well hydrated.            Encounter for screening for malignant neoplasm of colon    Relevant Orders    Colonoscopy Screening       Follow up in: 6 months or sooner if needed with labs prior.      Scribe Attestation  By signing my name below, IMeliza Scribe   attest that this documentation has been prepared under the direction and in the presence of Yuan Ortega MD.

## 2024-01-04 ENCOUNTER — HOSPITAL ENCOUNTER (OUTPATIENT)
Facility: HOSPITAL | Age: 59
Setting detail: OBSERVATION
Discharge: HOME | End: 2024-01-06
Attending: EMERGENCY MEDICINE | Admitting: STUDENT IN AN ORGANIZED HEALTH CARE EDUCATION/TRAINING PROGRAM
Payer: COMMERCIAL

## 2024-01-04 ENCOUNTER — APPOINTMENT (OUTPATIENT)
Dept: CARDIOLOGY | Facility: HOSPITAL | Age: 59
End: 2024-01-04
Payer: COMMERCIAL

## 2024-01-04 ENCOUNTER — APPOINTMENT (OUTPATIENT)
Dept: RADIOLOGY | Facility: HOSPITAL | Age: 59
End: 2024-01-04
Payer: COMMERCIAL

## 2024-01-04 DIAGNOSIS — R06.02 SOB (SHORTNESS OF BREATH): Primary | ICD-10-CM

## 2024-01-04 PROBLEM — J44.1 COPD EXACERBATION (MULTI): Status: ACTIVE | Noted: 2024-01-04

## 2024-01-04 LAB
ANION GAP BLDA CALCULATED.4IONS-SCNC: 10 MMO/L (ref 10–25)
ANION GAP SERPL CALC-SCNC: 12 MMOL/L (ref 10–20)
ARTERIAL PATENCY WRIST A: POSITIVE
ATRIAL RATE: 93 BPM
BASE EXCESS BLDA CALC-SCNC: 4.2 MMOL/L (ref -2–3)
BASOPHILS # BLD AUTO: 0.07 X10*3/UL (ref 0–0.1)
BASOPHILS NFR BLD AUTO: 0.5 %
BODY TEMPERATURE: ABNORMAL
BUN SERPL-MCNC: 19 MG/DL (ref 6–23)
CA-I BLDA-SCNC: 1.2 MMOL/L (ref 1.1–1.33)
CALCIUM SERPL-MCNC: 9.4 MG/DL (ref 8.6–10.3)
CARDIAC TROPONIN I PNL SERPL HS: 5 NG/L (ref 0–20)
CHLORIDE BLDA-SCNC: 100 MMOL/L (ref 98–107)
CHLORIDE SERPL-SCNC: 100 MMOL/L (ref 98–107)
CO2 SERPL-SCNC: 27 MMOL/L (ref 21–32)
CREAT SERPL-MCNC: 0.85 MG/DL (ref 0.5–1.3)
D DIMER PPP FEU-MCNC: <215 NG/ML FEU
EOSINOPHIL # BLD AUTO: 0.18 X10*3/UL (ref 0–0.7)
EOSINOPHIL NFR BLD AUTO: 1.4 %
ERYTHROCYTE [DISTWIDTH] IN BLOOD BY AUTOMATED COUNT: 13.9 % (ref 11.5–14.5)
FLUAV RNA RESP QL NAA+PROBE: NOT DETECTED
FLUBV RNA RESP QL NAA+PROBE: NOT DETECTED
GFR SERPL CREATININE-BSD FRML MDRD: >90 ML/MIN/1.73M*2
GLUCOSE BLDA-MCNC: 148 MG/DL (ref 74–99)
GLUCOSE SERPL-MCNC: 119 MG/DL (ref 74–99)
HCO3 BLDA-SCNC: 28.4 MMOL/L (ref 22–26)
HCT VFR BLD AUTO: 45.2 % (ref 41–52)
HCT VFR BLD EST: 46 % (ref 41–52)
HGB BLD-MCNC: 15.7 G/DL (ref 13.5–17.5)
HGB BLDA-MCNC: 15.4 G/DL (ref 13.5–17.5)
HOLD SPECIMEN: NORMAL
HOLD SPECIMEN: NORMAL
IMM GRANULOCYTES # BLD AUTO: 0.03 X10*3/UL (ref 0–0.7)
IMM GRANULOCYTES NFR BLD AUTO: 0.2 % (ref 0–0.9)
INHALED O2 CONCENTRATION: 21 %
LACTATE BLDA-SCNC: 1.8 MMOL/L (ref 0.4–2)
LYMPHOCYTES # BLD AUTO: 2.68 X10*3/UL (ref 1.2–4.8)
LYMPHOCYTES NFR BLD AUTO: 20.4 %
MCH RBC QN AUTO: 29.9 PG (ref 26–34)
MCHC RBC AUTO-ENTMCNC: 34.7 G/DL (ref 32–36)
MCV RBC AUTO: 86 FL (ref 80–100)
MONOCYTES # BLD AUTO: 1.34 X10*3/UL (ref 0.1–1)
MONOCYTES NFR BLD AUTO: 10.2 %
NEUTROPHILS # BLD AUTO: 8.83 X10*3/UL (ref 1.2–7.7)
NEUTROPHILS NFR BLD AUTO: 67.3 %
NRBC BLD-RTO: 0 /100 WBCS (ref 0–0)
OXYHGB MFR BLDA: 92.3 % (ref 94–98)
P AXIS: -21 DEGREES
P OFFSET: 188 MS
P ONSET: 132 MS
PCO2 BLDA: 40 MM HG (ref 38–42)
PH BLDA: 7.46 PH (ref 7.38–7.42)
PLATELET # BLD AUTO: 212 X10*3/UL (ref 150–450)
PO2 BLDA: 67 MM HG (ref 85–95)
POTASSIUM BLDA-SCNC: 4.4 MMOL/L (ref 3.5–5.3)
POTASSIUM SERPL-SCNC: 3.2 MMOL/L (ref 3.5–5.3)
PR INTERVAL: 184 MS
Q ONSET: 224 MS
QRS COUNT: 16 BEATS
QRS DURATION: 92 MS
QT INTERVAL: 312 MS
QTC CALCULATION(BAZETT): 387 MS
QTC FREDERICIA: 361 MS
R AXIS: 12 DEGREES
RBC # BLD AUTO: 5.25 X10*6/UL (ref 4.5–5.9)
RSV RNA RESP QL NAA+PROBE: NOT DETECTED
SAO2 % BLDA: 95 % (ref 94–100)
SARS-COV-2 RNA RESP QL NAA+PROBE: NOT DETECTED
SODIUM BLDA-SCNC: 134 MMOL/L (ref 136–145)
SODIUM SERPL-SCNC: 136 MMOL/L (ref 136–145)
SPECIMEN DRAWN FROM PATIENT: ABNORMAL
T AXIS: -51 DEGREES
T OFFSET: 380 MS
VENTRICULAR RATE: 93 BPM
WBC # BLD AUTO: 13.1 X10*3/UL (ref 4.4–11.3)

## 2024-01-04 PROCEDURE — 99222 1ST HOSP IP/OBS MODERATE 55: CPT | Performed by: NURSE PRACTITIONER

## 2024-01-04 PROCEDURE — 96365 THER/PROPH/DIAG IV INF INIT: CPT

## 2024-01-04 PROCEDURE — 94640 AIRWAY INHALATION TREATMENT: CPT

## 2024-01-04 PROCEDURE — 2500000002 HC RX 250 W HCPCS SELF ADMINISTERED DRUGS (ALT 637 FOR MEDICARE OP, ALT 636 FOR OP/ED): Performed by: EMERGENCY MEDICINE

## 2024-01-04 PROCEDURE — G0378 HOSPITAL OBSERVATION PER HR: HCPCS

## 2024-01-04 PROCEDURE — 80048 BASIC METABOLIC PNL TOTAL CA: CPT | Performed by: EMERGENCY MEDICINE

## 2024-01-04 PROCEDURE — 71046 X-RAY EXAM CHEST 2 VIEWS: CPT

## 2024-01-04 PROCEDURE — 2500000004 HC RX 250 GENERAL PHARMACY W/ HCPCS (ALT 636 FOR OP/ED): Performed by: EMERGENCY MEDICINE

## 2024-01-04 PROCEDURE — 96375 TX/PRO/DX INJ NEW DRUG ADDON: CPT

## 2024-01-04 PROCEDURE — 84484 ASSAY OF TROPONIN QUANT: CPT | Performed by: EMERGENCY MEDICINE

## 2024-01-04 PROCEDURE — 2500000004 HC RX 250 GENERAL PHARMACY W/ HCPCS (ALT 636 FOR OP/ED): Performed by: STUDENT IN AN ORGANIZED HEALTH CARE EDUCATION/TRAINING PROGRAM

## 2024-01-04 PROCEDURE — 85025 COMPLETE CBC W/AUTO DIFF WBC: CPT | Performed by: EMERGENCY MEDICINE

## 2024-01-04 PROCEDURE — 96366 THER/PROPH/DIAG IV INF ADDON: CPT

## 2024-01-04 PROCEDURE — 71046 X-RAY EXAM CHEST 2 VIEWS: CPT | Performed by: RADIOLOGY

## 2024-01-04 PROCEDURE — 2500000002 HC RX 250 W HCPCS SELF ADMINISTERED DRUGS (ALT 637 FOR MEDICARE OP, ALT 636 FOR OP/ED): Performed by: STUDENT IN AN ORGANIZED HEALTH CARE EDUCATION/TRAINING PROGRAM

## 2024-01-04 PROCEDURE — 96367 TX/PROPH/DG ADDL SEQ IV INF: CPT

## 2024-01-04 PROCEDURE — 87637 SARSCOV2&INF A&B&RSV AMP PRB: CPT | Performed by: EMERGENCY MEDICINE

## 2024-01-04 PROCEDURE — 36415 COLL VENOUS BLD VENIPUNCTURE: CPT | Performed by: EMERGENCY MEDICINE

## 2024-01-04 PROCEDURE — 85379 FIBRIN DEGRADATION QUANT: CPT | Performed by: EMERGENCY MEDICINE

## 2024-01-04 PROCEDURE — 2500000001 HC RX 250 WO HCPCS SELF ADMINISTERED DRUGS (ALT 637 FOR MEDICARE OP): Performed by: EMERGENCY MEDICINE

## 2024-01-04 PROCEDURE — 83735 ASSAY OF MAGNESIUM: CPT | Performed by: STUDENT IN AN ORGANIZED HEALTH CARE EDUCATION/TRAINING PROGRAM

## 2024-01-04 PROCEDURE — 84132 ASSAY OF SERUM POTASSIUM: CPT | Mod: 59 | Performed by: EMERGENCY MEDICINE

## 2024-01-04 PROCEDURE — 93005 ELECTROCARDIOGRAM TRACING: CPT

## 2024-01-04 PROCEDURE — 99285 EMERGENCY DEPT VISIT HI MDM: CPT | Performed by: EMERGENCY MEDICINE

## 2024-01-04 PROCEDURE — 2500000004 HC RX 250 GENERAL PHARMACY W/ HCPCS (ALT 636 FOR OP/ED): Performed by: NURSE PRACTITIONER

## 2024-01-04 RX ORDER — IPRATROPIUM BROMIDE AND ALBUTEROL SULFATE 2.5; .5 MG/3ML; MG/3ML
3 SOLUTION RESPIRATORY (INHALATION) EVERY 4 HOURS PRN
Status: DISCONTINUED | OUTPATIENT
Start: 2024-01-04 | End: 2024-01-06 | Stop reason: HOSPADM

## 2024-01-04 RX ORDER — ALBUTEROL SULFATE 0.83 MG/ML
2.5 SOLUTION RESPIRATORY (INHALATION) EVERY 20 MIN
Status: COMPLETED | OUTPATIENT
Start: 2024-01-04 | End: 2024-01-04

## 2024-01-04 RX ORDER — LEVOFLOXACIN 5 MG/ML
500 INJECTION, SOLUTION INTRAVENOUS EVERY 24 HOURS
Status: DISCONTINUED | OUTPATIENT
Start: 2024-01-05 | End: 2024-01-06 | Stop reason: HOSPADM

## 2024-01-04 RX ORDER — GUAIFENESIN 600 MG/1
600 TABLET, EXTENDED RELEASE ORAL 2 TIMES DAILY
Status: DISCONTINUED | OUTPATIENT
Start: 2024-01-04 | End: 2024-01-06 | Stop reason: HOSPADM

## 2024-01-04 RX ORDER — POTASSIUM CHLORIDE 20 MEQ/1
40 TABLET, EXTENDED RELEASE ORAL ONCE
Status: COMPLETED | OUTPATIENT
Start: 2024-01-04 | End: 2024-01-04

## 2024-01-04 RX ORDER — ACETAMINOPHEN 325 MG/1
650 TABLET ORAL EVERY 4 HOURS PRN
Status: DISCONTINUED | OUTPATIENT
Start: 2024-01-04 | End: 2024-01-06 | Stop reason: HOSPADM

## 2024-01-04 RX ORDER — IPRATROPIUM BROMIDE AND ALBUTEROL SULFATE 2.5; .5 MG/3ML; MG/3ML
3 SOLUTION RESPIRATORY (INHALATION)
Status: DISCONTINUED | OUTPATIENT
Start: 2024-01-04 | End: 2024-01-04

## 2024-01-04 RX ORDER — LEVOFLOXACIN 5 MG/ML
500 INJECTION, SOLUTION INTRAVENOUS ONCE
Status: COMPLETED | OUTPATIENT
Start: 2024-01-04 | End: 2024-01-04

## 2024-01-04 RX ORDER — ACETAMINOPHEN 650 MG/1
650 SUPPOSITORY RECTAL EVERY 4 HOURS PRN
Status: DISCONTINUED | OUTPATIENT
Start: 2024-01-04 | End: 2024-01-06 | Stop reason: HOSPADM

## 2024-01-04 RX ORDER — ENOXAPARIN SODIUM 100 MG/ML
40 INJECTION SUBCUTANEOUS EVERY 24 HOURS
Status: DISCONTINUED | OUTPATIENT
Start: 2024-01-05 | End: 2024-01-06 | Stop reason: HOSPADM

## 2024-01-04 RX ORDER — IPRATROPIUM BROMIDE AND ALBUTEROL SULFATE 2.5; .5 MG/3ML; MG/3ML
3 SOLUTION RESPIRATORY (INHALATION) 3 TIMES DAILY
Status: DISCONTINUED | OUTPATIENT
Start: 2024-01-05 | End: 2024-01-06 | Stop reason: HOSPADM

## 2024-01-04 RX ORDER — POLYETHYLENE GLYCOL 3350 17 G/17G
17 POWDER, FOR SOLUTION ORAL DAILY PRN
Status: DISCONTINUED | OUTPATIENT
Start: 2024-01-04 | End: 2024-01-06 | Stop reason: HOSPADM

## 2024-01-04 RX ORDER — LEVOFLOXACIN 5 MG/ML
500 INJECTION, SOLUTION INTRAVENOUS EVERY 24 HOURS
Status: DISCONTINUED | OUTPATIENT
Start: 2024-01-05 | End: 2024-01-04

## 2024-01-04 RX ORDER — MAGNESIUM SULFATE HEPTAHYDRATE 40 MG/ML
2 INJECTION, SOLUTION INTRAVENOUS ONCE
Status: COMPLETED | OUTPATIENT
Start: 2024-01-04 | End: 2024-01-04

## 2024-01-04 RX ORDER — ACETAMINOPHEN 160 MG/5ML
650 SOLUTION ORAL EVERY 4 HOURS PRN
Status: DISCONTINUED | OUTPATIENT
Start: 2024-01-04 | End: 2024-01-06 | Stop reason: HOSPADM

## 2024-01-04 RX ORDER — POTASSIUM CHLORIDE 1.5 G/1.58G
40 POWDER, FOR SOLUTION ORAL ONCE
Status: COMPLETED | OUTPATIENT
Start: 2024-01-04 | End: 2024-01-04

## 2024-01-04 RX ADMIN — ALBUTEROL SULFATE 2.5 MG: 2.5 SOLUTION RESPIRATORY (INHALATION) at 15:15

## 2024-01-04 RX ADMIN — LEVOFLOXACIN 500 MG: 5 INJECTION, SOLUTION INTRAVENOUS at 18:59

## 2024-01-04 RX ADMIN — POTASSIUM CHLORIDE 40 MEQ: 1.5 POWDER, FOR SOLUTION ORAL at 17:25

## 2024-01-04 RX ADMIN — ALBUTEROL SULFATE 2.5 MG: 2.5 SOLUTION RESPIRATORY (INHALATION) at 15:16

## 2024-01-04 RX ADMIN — GUAIFENESIN 600 MG: 600 TABLET, EXTENDED RELEASE ORAL at 22:08

## 2024-01-04 RX ADMIN — POTASSIUM CHLORIDE 40 MEQ: 1500 TABLET, EXTENDED RELEASE ORAL at 21:37

## 2024-01-04 RX ADMIN — IPRATROPIUM BROMIDE AND ALBUTEROL SULFATE 3 ML: 2.5; .5 SOLUTION RESPIRATORY (INHALATION) at 18:24

## 2024-01-04 RX ADMIN — IPRATROPIUM BROMIDE AND ALBUTEROL SULFATE 3 ML: 2.5; .5 SOLUTION RESPIRATORY (INHALATION) at 23:34

## 2024-01-04 RX ADMIN — MAGNESIUM SULFATE HEPTAHYDRATE 2 G: 40 INJECTION, SOLUTION INTRAVENOUS at 16:08

## 2024-01-04 RX ADMIN — ALBUTEROL SULFATE 2.5 MG: 2.5 SOLUTION RESPIRATORY (INHALATION) at 15:26

## 2024-01-04 RX ADMIN — METHYLPREDNISOLONE SODIUM SUCCINATE 125 MG: 125 INJECTION, POWDER, FOR SOLUTION INTRAMUSCULAR; INTRAVENOUS at 18:59

## 2024-01-04 SDOH — SOCIAL STABILITY: SOCIAL INSECURITY: WERE YOU ABLE TO COMPLETE ALL THE BEHAVIORAL HEALTH SCREENINGS?: YES

## 2024-01-04 SDOH — SOCIAL STABILITY: SOCIAL INSECURITY: DOES ANYONE TRY TO KEEP YOU FROM HAVING/CONTACTING OTHER FRIENDS OR DOING THINGS OUTSIDE YOUR HOME?: NO

## 2024-01-04 SDOH — SOCIAL STABILITY: SOCIAL INSECURITY: DO YOU FEEL UNSAFE GOING BACK TO THE PLACE WHERE YOU ARE LIVING?: NO

## 2024-01-04 SDOH — SOCIAL STABILITY: SOCIAL INSECURITY: HAS ANYONE EVER THREATENED TO HURT YOUR FAMILY OR YOUR PETS?: NO

## 2024-01-04 SDOH — SOCIAL STABILITY: SOCIAL INSECURITY: ARE THERE ANY APPARENT SIGNS OF INJURIES/BEHAVIORS THAT COULD BE RELATED TO ABUSE/NEGLECT?: NO

## 2024-01-04 SDOH — SOCIAL STABILITY: SOCIAL INSECURITY: HAVE YOU HAD THOUGHTS OF HARMING ANYONE ELSE?: NO

## 2024-01-04 SDOH — SOCIAL STABILITY: SOCIAL INSECURITY: ABUSE: ADULT

## 2024-01-04 SDOH — SOCIAL STABILITY: SOCIAL INSECURITY: DO YOU FEEL ANYONE HAS EXPLOITED OR TAKEN ADVANTAGE OF YOU FINANCIALLY OR OF YOUR PERSONAL PROPERTY?: NO

## 2024-01-04 SDOH — SOCIAL STABILITY: SOCIAL INSECURITY: ARE YOU OR HAVE YOU BEEN THREATENED OR ABUSED PHYSICALLY, EMOTIONALLY, OR SEXUALLY BY ANYONE?: NO

## 2024-01-04 ASSESSMENT — LIFESTYLE VARIABLES
HOW MANY STANDARD DRINKS CONTAINING ALCOHOL DO YOU HAVE ON A TYPICAL DAY: PATIENT DOES NOT DRINK
REASON UNABLE TO ASSESS: NO
HAVE YOU EVER FELT YOU SHOULD CUT DOWN ON YOUR DRINKING: NO
AUDIT-C TOTAL SCORE: 0
HOW OFTEN DO YOU HAVE A DRINK CONTAINING ALCOHOL: NEVER
HOW OFTEN DO YOU HAVE 6 OR MORE DRINKS ON ONE OCCASION: NEVER
AUDIT-C TOTAL SCORE: 0
SKIP TO QUESTIONS 9-10: 1
EVER HAD A DRINK FIRST THING IN THE MORNING TO STEADY YOUR NERVES TO GET RID OF A HANGOVER: NO
HAVE PEOPLE ANNOYED YOU BY CRITICIZING YOUR DRINKING: NO
EVER FELT BAD OR GUILTY ABOUT YOUR DRINKING: NO

## 2024-01-04 ASSESSMENT — COGNITIVE AND FUNCTIONAL STATUS - GENERAL
PATIENT BASELINE BEDBOUND: NO
MOBILITY SCORE: 24
DAILY ACTIVITIY SCORE: 24

## 2024-01-04 ASSESSMENT — PAIN DESCRIPTION - PROGRESSION: CLINICAL_PROGRESSION: NOT CHANGED

## 2024-01-04 ASSESSMENT — COLUMBIA-SUICIDE SEVERITY RATING SCALE - C-SSRS
1. IN THE PAST MONTH, HAVE YOU WISHED YOU WERE DEAD OR WISHED YOU COULD GO TO SLEEP AND NOT WAKE UP?: NO
2. HAVE YOU ACTUALLY HAD ANY THOUGHTS OF KILLING YOURSELF?: NO
2. HAVE YOU ACTUALLY HAD ANY THOUGHTS OF KILLING YOURSELF?: NO
6. HAVE YOU EVER DONE ANYTHING, STARTED TO DO ANYTHING, OR PREPARED TO DO ANYTHING TO END YOUR LIFE?: NO
1. IN THE PAST MONTH, HAVE YOU WISHED YOU WERE DEAD OR WISHED YOU COULD GO TO SLEEP AND NOT WAKE UP?: NO

## 2024-01-04 ASSESSMENT — PATIENT HEALTH QUESTIONNAIRE - PHQ9
1. LITTLE INTEREST OR PLEASURE IN DOING THINGS: NOT AT ALL
2. FEELING DOWN, DEPRESSED OR HOPELESS: NOT AT ALL
SUM OF ALL RESPONSES TO PHQ9 QUESTIONS 1 & 2: 0

## 2024-01-04 ASSESSMENT — ACTIVITIES OF DAILY LIVING (ADL)
FEEDING YOURSELF: INDEPENDENT
BATHING: INDEPENDENT
LACK_OF_TRANSPORTATION: NO
HEARING - RIGHT EAR: FUNCTIONAL
WALKS IN HOME: INDEPENDENT
JUDGMENT_ADEQUATE_SAFELY_COMPLETE_DAILY_ACTIVITIES: YES
TOILETING: INDEPENDENT
PATIENT'S MEMORY ADEQUATE TO SAFELY COMPLETE DAILY ACTIVITIES?: YES
DRESSING YOURSELF: INDEPENDENT
HEARING - LEFT EAR: FUNCTIONAL
ADEQUATE_TO_COMPLETE_ADL: YES
GROOMING: INDEPENDENT

## 2024-01-04 ASSESSMENT — PAIN SCALES - GENERAL
PAINLEVEL_OUTOF10: 0 - NO PAIN
PAINLEVEL_OUTOF10: 0 - NO PAIN

## 2024-01-04 ASSESSMENT — PAIN - FUNCTIONAL ASSESSMENT: PAIN_FUNCTIONAL_ASSESSMENT: 0-10

## 2024-01-04 NOTE — ED PROVIDER NOTES
HPI   Chief Complaint   Patient presents with    URI     Pt states sick for 1 month; cough and congestion. Took 2 rounds of augmentin for sinus infection with no relief. Has been taking tylenol at home with multiple OTC medications        HPI: 58-year-old male presents with nasal congestion and a cough for 3 weeks.  He states he has been on previous antibiotics.  He states he went back to the urgent care today and they told him that he should come here to be seen.  He is denying chest pain.  He denies any abdominal pain.  Nuys any swelling in his legs.  He denies any history of DVT or PE.  He states he has a cough productive for yellow sputum.    Family HX: Denies any significant/pertinent family history.  Social Hx: Denies ETOH or drug use.  Review of systems:  Gen.: No weight loss, fatigue, anorexia, insomnia, fever.   Eyes: No vision loss, double vision, drainage, eye pain.   ENT: No pharyngitis, dry mouth.   Cardiac: No chest pain, palpitations, syncope, near syncope.   Pulmonary: No shortness of breath,hemoptysis.   Heme/lymph: No swollen glands, fever, bleeding.   GI: No abdominal pain, change in bowel habits, melena, hematemesis, hematochezia, nausea, vomiting, diarrhea.   : No discharge, dysuria, frequency, urgency, hematuria.   Musculoskeletal: No limb pain, joint pain, joint swelling.   Skin: No rashes.   Psych: No depression, anxiety, suicidality, homicidality.   Review of systems is otherwise negative unless stated above or in history of present illness.    Physical Exam:    Appearance: Alert, oriented , cooperative,  in no acute distress. Well nourished & well hydrated.    Skin: Intact,  dry skin, no lesions, rash, petechiae or purpura.     Eyes: PERRLA, EOMs intact,  Conjunctiva pink with no redness or exudates. Eyelids without lesions. No scleral icterus.     ENT: Hearing grossly intact. External auditory canals patent, tympanic membranes intact with visible landmarks. Nares patent, mucus membranes  moist. Dentition without lesions. Pharynx clear, uvula midline.  Nasal congestion noted    Neck: Supple, without meningismus. Thyroid not palpable. Trachea at midline. No lymphadenopathy.    Pulmonary: Coarse breath sounds bilaterally no accessory muscle use or stridor.    Cardiac: Normal S1, S2 without murmur, rub, gallop or extrasystole. No JVD, Carotids without bruits.    Abdomen: Soft, nontender, active bowel sounds.  No palpable organomegaly.  No rebound or guarding.  No CVA tenderness.    Genitourinary: Exam deferred.    Musculoskeletal: Full range of motion. no pain, edema, or deformity. Pulses full and equal. No cyanosis, clubbing, or edema.    Neurological:  Cranial nerves II through XII are grossly intact, finger-nose touch is normal, normal sensation, no weakness, no focal findings identified.    Psychiatric: Appropriate mood and affect.     Medical Decision-Making:  Testing: ECG was obtained which is interpreted by me shows a sinus rhythm rate of 93 nonspecific ST-T wave changes but no evidence of obvious ST elevations or T wave inversions to indicate acute ischemia or infarct.  Chest x-ray was obtained and read by radiology as negative.  Patient is satting 99% on room air.  Troponin is negative.  VTE is negative.  Patient was intermittently hypoxic.  BG shows a pH 7.46 with a pCO2 of 40 and a pO2 of 67.  Patient did receive breathing treatments.  Be given steroids.  At this time however given that the walking pulse ox was at best 93 I did recommend admission.  I spoke to Dr. Hough and the patient will be admitted.  Treatment:   Reevaluation:   Plan: admit Patient and family/friend/caregiver are in agreement with this plan.   Impression:   1.  Cough with intermittent hypoxemia  2.  Labs Reviewed  CBC WITH AUTO DIFFERENTIAL - Abnormal     WBC                           13.1 (*)               nRBC                          0.0                    RBC                           5.25                   Hemoglobin                     15.7                   Hematocrit                    45.2                   MCV                           86                     MCH                           29.9                   MCHC                          34.7                   RDW                           13.9                   Platelets                     212                    Neutrophils %                 67.3                   Immature Granulocytes %, Automated   0.2                    Lymphocytes %                 20.4                   Monocytes %                   10.2                   Eosinophils %                 1.4                    Basophils %                   0.5                    Neutrophils Absolute          8.83 (*)               Immature Granulocytes Absolute, Au*   0.03                   Lymphocytes Absolute          2.68                   Monocytes Absolute            1.34 (*)               Eosinophils Absolute          0.18                   Basophils Absolute            0.07                BASIC METABOLIC PANEL - Abnormal     Glucose                       119 (*)                Sodium                        136                    Potassium                     3.2 (*)                Chloride                      100                    Bicarbonate                   27                     Anion Gap                     12                     Urea Nitrogen                 19                     Creatinine                    0.85                   eGFR                          >90                    Calcium                       9.4                 BLOOD GAS ARTERIAL FULL PANEL - Abnormal     POCT pH, Arterial             7.46 (*)               POCT pCO2, Arterial           40                     POCT pO2, Arterial            67 (*)                 POCT SO2, Arterial            95                     POCT Oxy Hemoglobin, Arterial   92.3 (*)               POCT Hematocrit Calculated, Arteri*   46.0                   POCT  Sodium, Arterial         134 (*)                POCT Potassium, Arterial      4.4                    POCT Chloride, Arterial       100                    POCT Ionized Calcium, Arterial   1.20                   POCT Glucose, Arterial        148 (*)                POCT Lactate, Arterial        1.8                    POCT Base Excess, Arterial    4.2 (*)                POCT HCO3 Calculated, Arterial   28.4 (*)               POCT Hemoglobin, Arterial     15.4                   POCT Anion Gap, Arterial      10                     Patient Temperature                                  FiO2                          21                     Site of Arterial Puncture                            Shreyas's Test                  Positive             SARS-COV-2 PCR, SYMPTOMATIC - Normal     Coronavirus 2019, PCR                                  Narrative: This assay has received FDA Emergency Use Authorization (EUA) and is only authorized for the duration of time that circumstances exist to justify the authorization of the emergency use of in vitro diagnostic tests for the detection of SARS-CoV-2 virus and/or diagnosis of COVID-19 infection under section 564(b)(1) of the Act, 21 U.S.C. 360bbb-3(b)(1). This assay is an in vitro diagnostic nucleic acid amplification test for the qualitative detection of SARS-CoV-2 from nasopharyngeal specimens and has been validated for use at [facility]. Negative results do not preclude COVID-19 infections and should not be used as the sole basis for diagnosis, treatment, or other management decisions.                  INFLUENZA A AND B PCR - Normal     Flu A Result                                         Flu B Result                                           Narrative: This assay is an in vitro diagnostic multiplex nucleic acid amplification test for the detection and discrimination of Influenza A & B from nasopharyngeal specimens, and has been validated for use at [facility]. Negative results do not preclude Influenza A/B infections, and should not be used as the sole basis for diagnosis, treatment, or other management decisions. If Influenza A/B and RSV PCR results are negative, testing for Parainfluenza virus, Adenovirus and Metapneumovirus is routinely performed for [facility] [place] and intensive care inpatients, and is available on other patients by placing an add-on request.  RSV PCR - Normal     RSV PCR                                                Narrative: This assay is an FDA-cleared, in vitro diagnostic nucleic acid amplification test for the detection of RSV from nasopharyngeal specimens, and has been validated for use at [facility]. Negative results do not preclude RSV infections, and should not be used as the sole basis for diagnosis, treatment, or other management decisions. If Influenza A/B and RSV PCR results are negative, testing for Parainfluenza virus, Adenovirus and Metapneumovirus is routinely performed for pediatric oncology and intensive care inpatients at [facility], and is available on other patients by placing an add-on request.                                  TROPONIN I, HIGH SENSITIVITY - Normal     Troponin I, High Sensitivity   5                        Narrative: Less than 99th percentile of normal range cutoff-                Female and children under 18 years old <14 ng/L; Male <21 ng/L: Negative                Repeat testing should be performed if clinically indicated.                                 Female and children under 18 years old 14-50 ng/L; Male 21-50 ng/L:                Consistent with possible cardiac damage and possible increased clinical                 risk. Serial measurements may help to assess extent of myocardial damage.                                 >50 ng/L: Consistent with cardiac damage, increased clinical risk and                myocardial infarction. Serial measurements may  help assess extent of                 myocardial damage.                                  NOTE: Children less than 1 year old may have higher baseline troponin                 levels and results should be interpreted in conjunction with the overall                 clinical context.                                 NOTE: Troponin I testing is performed using a different                 testing methodology at [facility] than at other                 [facility] hospitals. Direct result comparisons should only                 be made within the same method.  D-DIMER, NON VTE - Normal     D-Dimer Non VTE, Quant (ng/mL FEU)   <215                     Narrative: The D-Dimer assay is reported in ng/mL Fibrinogen Equivalent Units (FEU). The results of this assay should NOT be used for the exclusion of Deep Vein Thrombosis and/or Pulmonary Embolism.  GRAY TOP     XR chest 2 views   Final Result    No focal infiltrate or pneumothorax.          MACRO:    None.          Signed by: Dragan Hong 1/4/2024 3:19 PM    Dictation workstation:   [id number]                                  Vernal Coma Scale Score: 15                  Patient History   Past Medical History:   Diagnosis Date    Other allergic rhinitis 02/18/2020    Perennial allergic rhinitis    Plantar fascial fibromatosis 03/06/2020    Plantar fasciitis of left foot    Ventral hernia without obstruction or gangrene 04/06/2021    Ventral hernia without obstruction or gangrene     Past Surgical History:   Procedure Laterality Date    OTHER SURGICAL HISTORY  02/18/2020    Hernia repair     Family History   Problem Relation Name Age of Onset    Diabetes type II Mother      Other (cardiac disorder) Father      Hypertension Father       Social History     Tobacco Use    Smoking status: Never    Smokeless tobacco: Never   Substance Use Topics    Alcohol use: Never    Drug use: Never       Physical Exam   ED Triage Vitals [01/04/24 1444]   Temp Heart Rate Resp BP    36.1 °C (97 °F) 93 16 165/78      SpO2 Temp Source Heart Rate Source Patient Position   94 % Skin Monitor --      BP Location FiO2 (%)     -- --       Physical Exam    ED Course & MDM   Diagnoses as of 01/04/24 2003   SOB (shortness of breath)       Medical Decision Making      Procedure  Procedures     Katie Muro MD  01/04/24 2005

## 2024-01-05 ENCOUNTER — APPOINTMENT (OUTPATIENT)
Dept: CARDIOLOGY | Facility: HOSPITAL | Age: 59
End: 2024-01-05
Payer: COMMERCIAL

## 2024-01-05 LAB
ANION GAP SERPL CALC-SCNC: 15 MMOL/L (ref 10–20)
ATRIAL RATE: 106 BPM
BUN SERPL-MCNC: 21 MG/DL (ref 6–23)
CALCIUM SERPL-MCNC: 9.8 MG/DL (ref 8.6–10.3)
CHLORIDE SERPL-SCNC: 100 MMOL/L (ref 98–107)
CO2 SERPL-SCNC: 24 MMOL/L (ref 21–32)
CREAT SERPL-MCNC: 0.86 MG/DL (ref 0.5–1.3)
GFR SERPL CREATININE-BSD FRML MDRD: >90 ML/MIN/1.73M*2
GLUCOSE SERPL-MCNC: 343 MG/DL (ref 74–99)
MAGNESIUM SERPL-MCNC: 1.65 MG/DL (ref 1.6–2.4)
P AXIS: 17 DEGREES
P OFFSET: 185 MS
P ONSET: 125 MS
POTASSIUM SERPL-SCNC: 4.2 MMOL/L (ref 3.5–5.3)
PR INTERVAL: 202 MS
Q ONSET: 226 MS
QRS COUNT: 18 BEATS
QRS DURATION: 84 MS
QT INTERVAL: 248 MS
QTC CALCULATION(BAZETT): 329 MS
QTC FREDERICIA: 299 MS
R AXIS: -7 DEGREES
SODIUM SERPL-SCNC: 135 MMOL/L (ref 136–145)
T AXIS: -39 DEGREES
T OFFSET: 350 MS
VENTRICULAR RATE: 106 BPM

## 2024-01-05 PROCEDURE — 84484 ASSAY OF TROPONIN QUANT: CPT | Performed by: HOSPITALIST

## 2024-01-05 PROCEDURE — G0378 HOSPITAL OBSERVATION PER HR: HCPCS

## 2024-01-05 PROCEDURE — 99232 SBSQ HOSP IP/OBS MODERATE 35: CPT | Performed by: STUDENT IN AN ORGANIZED HEALTH CARE EDUCATION/TRAINING PROGRAM

## 2024-01-05 PROCEDURE — 93005 ELECTROCARDIOGRAM TRACING: CPT

## 2024-01-05 PROCEDURE — 36415 COLL VENOUS BLD VENIPUNCTURE: CPT | Performed by: STUDENT IN AN ORGANIZED HEALTH CARE EDUCATION/TRAINING PROGRAM

## 2024-01-05 PROCEDURE — 80048 BASIC METABOLIC PNL TOTAL CA: CPT | Performed by: STUDENT IN AN ORGANIZED HEALTH CARE EDUCATION/TRAINING PROGRAM

## 2024-01-05 PROCEDURE — 94640 AIRWAY INHALATION TREATMENT: CPT

## 2024-01-05 PROCEDURE — 2500000004 HC RX 250 GENERAL PHARMACY W/ HCPCS (ALT 636 FOR OP/ED): Performed by: STUDENT IN AN ORGANIZED HEALTH CARE EDUCATION/TRAINING PROGRAM

## 2024-01-05 PROCEDURE — 96366 THER/PROPH/DIAG IV INF ADDON: CPT

## 2024-01-05 PROCEDURE — 2500000001 HC RX 250 WO HCPCS SELF ADMINISTERED DRUGS (ALT 637 FOR MEDICARE OP): Performed by: STUDENT IN AN ORGANIZED HEALTH CARE EDUCATION/TRAINING PROGRAM

## 2024-01-05 PROCEDURE — 96367 TX/PROPH/DG ADDL SEQ IV INF: CPT

## 2024-01-05 PROCEDURE — 2500000002 HC RX 250 W HCPCS SELF ADMINISTERED DRUGS (ALT 637 FOR MEDICARE OP, ALT 636 FOR OP/ED): Performed by: STUDENT IN AN ORGANIZED HEALTH CARE EDUCATION/TRAINING PROGRAM

## 2024-01-05 PROCEDURE — 96376 TX/PRO/DX INJ SAME DRUG ADON: CPT

## 2024-01-05 PROCEDURE — 93010 ELECTROCARDIOGRAM REPORT: CPT | Performed by: INTERNAL MEDICINE

## 2024-01-05 RX ORDER — MAGNESIUM SULFATE HEPTAHYDRATE 40 MG/ML
2 INJECTION, SOLUTION INTRAVENOUS ONCE
Status: COMPLETED | OUTPATIENT
Start: 2024-01-05 | End: 2024-01-05

## 2024-01-05 RX ORDER — POTASSIUM CHLORIDE 14.9 MG/ML
20 INJECTION INTRAVENOUS ONCE
Status: COMPLETED | OUTPATIENT
Start: 2024-01-05 | End: 2024-01-05

## 2024-01-05 RX ORDER — POTASSIUM CHLORIDE 20 MEQ/1
40 TABLET, EXTENDED RELEASE ORAL ONCE
Status: COMPLETED | OUTPATIENT
Start: 2024-01-05 | End: 2024-01-05

## 2024-01-05 RX ORDER — BUSPIRONE HYDROCHLORIDE 5 MG/1
10 TABLET ORAL ONCE
Status: COMPLETED | OUTPATIENT
Start: 2024-01-05 | End: 2024-01-05

## 2024-01-05 RX ADMIN — LEVOFLOXACIN 500 MG: 5 INJECTION, SOLUTION INTRAVENOUS at 09:20

## 2024-01-05 RX ADMIN — POTASSIUM CHLORIDE 40 MEQ: 1500 TABLET, EXTENDED RELEASE ORAL at 10:25

## 2024-01-05 RX ADMIN — ENOXAPARIN SODIUM 40 MG: 40 INJECTION SUBCUTANEOUS at 08:01

## 2024-01-05 RX ADMIN — METHYLPREDNISOLONE SODIUM SUCCINATE 40 MG: 40 INJECTION, POWDER, FOR SOLUTION INTRAMUSCULAR; INTRAVENOUS at 06:16

## 2024-01-05 RX ADMIN — GUAIFENESIN 600 MG: 600 TABLET, EXTENDED RELEASE ORAL at 21:36

## 2024-01-05 RX ADMIN — METHYLPREDNISOLONE SODIUM SUCCINATE 40 MG: 40 INJECTION, POWDER, FOR SOLUTION INTRAMUSCULAR; INTRAVENOUS at 21:36

## 2024-01-05 RX ADMIN — IPRATROPIUM BROMIDE AND ALBUTEROL SULFATE 3 ML: 2.5; .5 SOLUTION RESPIRATORY (INHALATION) at 20:17

## 2024-01-05 RX ADMIN — METHYLPREDNISOLONE SODIUM SUCCINATE 40 MG: 40 INJECTION, POWDER, FOR SOLUTION INTRAMUSCULAR; INTRAVENOUS at 13:42

## 2024-01-05 RX ADMIN — POTASSIUM CHLORIDE 20 MEQ: 14.9 INJECTION, SOLUTION INTRAVENOUS at 10:25

## 2024-01-05 RX ADMIN — MAGNESIUM SULFATE HEPTAHYDRATE 2 G: 40 INJECTION, SOLUTION INTRAVENOUS at 14:44

## 2024-01-05 RX ADMIN — IPRATROPIUM BROMIDE AND ALBUTEROL SULFATE 3 ML: 2.5; .5 SOLUTION RESPIRATORY (INHALATION) at 07:28

## 2024-01-05 RX ADMIN — IPRATROPIUM BROMIDE AND ALBUTEROL SULFATE 3 ML: 2.5; .5 SOLUTION RESPIRATORY (INHALATION) at 13:18

## 2024-01-05 RX ADMIN — GUAIFENESIN 600 MG: 600 TABLET, EXTENDED RELEASE ORAL at 08:01

## 2024-01-05 RX ADMIN — BUSPIRONE HYDROCHLORIDE 10 MG: 5 TABLET ORAL at 14:44

## 2024-01-05 ASSESSMENT — PAIN SCALES - GENERAL
PAINLEVEL_OUTOF10: 0 - NO PAIN

## 2024-01-05 ASSESSMENT — PAIN - FUNCTIONAL ASSESSMENT
PAIN_FUNCTIONAL_ASSESSMENT: 0-10
PAIN_FUNCTIONAL_ASSESSMENT: 0-10

## 2024-01-05 NOTE — H&P
Medical Group History and Physical    ASSESSMENT & PLAN:     COPD exacerbation  Acute Bronchitis  CXR is clear, lab work fairly unremarkable, prior tx with amoxicillin and oral prednisone taper, failed outpatient therapy.  - supportive treatments  - Levaquin IV 500mg  - duo nebs Q4 and PRN  - steroids, flonase, tesslon pearls    VTE Prophylaxis: lovenox     Iram Ly, APRN-CNP    HISTORY OF PRESENT ILLNESS:   Chief Complaint: cough    History Of Present Illness:    Charlie Massey is a 58 y.o. male with a significant past medical history of HTN, COPD, HLD, KARLEE presenting to [place] [facility] with c/o chronic cough not improving with outpatient treatment. Seen on 12/19 for cough x2 weeks treated with amoxicillin and prednisone, symptoms have been ongoing with no improvement, wife also sick with similar symptoms. Denies hx of CHF or CKD. Intermittent fevers treated with ibuprofen at home, takes robitussin and tesslon pearls for cough with minimal relief. CXR does not show evidence of pneumonia, pneumo, or infiltrates, EKG shows NSR 93 Qtc 387. No c/o chest pain. K is 3.2, replacement ordered, lab work shows evidence of leukocytosis but was treated with steroids recently, non-toxic, non smoker. VSS are patient is ready for admission to medicine service for the management of Acute bronchitis and COPD exacerbation.     Review of systems: 10 point review of systems is otherwise negative except as mentioned above.    PAST HISTORIES:       Past Medical History:  Medical Problems       Problem List       * (Principal) COPD exacerbation (CMS/[facility])    Closed displaced fracture of base of second metacarpal bone    ECG abnormality    Generalized anxiety disorder    Generalized arthritis    Hyperlipidemia    Hypertension    Left knee pain    Pre-diabetes    Primary osteoarthritis of left knee    Status post total left knee replacement    Recurrent major depressive disorder, in partial remission (CMS/HCC)    Class 2 obesity due  to excess calories without serious comorbidity with body mass index (BMI) of 35.0 to 35.9 in adult    Encounter for screening for malignant neoplasm of colon           Past Surgical History:  Past Surgical History:   Procedure Laterality Date    OTHER SURGICAL HISTORY  02/18/2020    Hernia repair          Social History:  He reports that he has never smoked. He has never used smokeless tobacco. He reports that he does not drink alcohol and does not use drugs.    Family History:  Family History   Problem Relation Name Age of Onset    Diabetes type II Mother      Other (cardiac disorder) Father      Hypertension Father          Allergies:  Codeine    OBJECTIVE:       Last Recorded Vitals:  Vitals:    01/04/24 1526 01/04/24 1642 01/04/24 1731 01/04/24 1836   BP:  123/71 134/70 151/77   BP Location:   Left arm Left arm   Patient Position:   Lying Lying   Pulse:  86 92 96   Resp:  16 18 20   Temp:       TempSrc:       SpO2: 99% 91% 96% 95%   Weight:       Height:           Last I/O:  No intake/output data recorded.    Physical Exam  Vitals and nursing note reviewed.   Constitutional:       Appearance: Normal appearance. He is obese.   HENT:      Head: Normocephalic and atraumatic.      Mouth/Throat:      Mouth: Mucous membranes are moist.      Pharynx: Oropharynx is clear.   Eyes:      Extraocular Movements: Extraocular movements intact.      Conjunctiva/sclera: Conjunctivae normal.      Pupils: Pupils are equal, round, and reactive to light.   Cardiovascular:      Rate and Rhythm: Normal rate and regular rhythm.      Pulses: Normal pulses.      Heart sounds: Normal heart sounds.   Pulmonary:      Effort: Pulmonary effort is normal.      Breath sounds: Wheezing and rhonchi present.      Comments: Diminished bases bilaterally  Abdominal:      General: Bowel sounds are normal.      Palpations: Abdomen is soft.   Genitourinary:     Comments: Not assessed  Musculoskeletal:         General: Normal range of motion.       Cervical back: Normal range of motion and neck supple.      Comments: No edema   Skin:     General: Skin is warm and dry.      Capillary Refill: Capillary refill takes less than 2 seconds.   Neurological:      General: No focal deficit present.      Mental Status: He is alert and oriented to person, place, and time. Mental status is at baseline.   Psychiatric:         Mood and Affect: Mood normal.         Behavior: Behavior normal.         Thought Content: Thought content normal.         Judgment: Judgment normal.           Scheduled Medications  ipratropium-albuteroL, 3 mL, nebulization, q6h  levoFLOXacin, 500 mg, intravenous, Once      PRN Medications    Continuous Medications       Outpatient Medications:  Prior to Admission medications    Medication Sig Start Date End Date Taking? Authorizing Provider   busPIRone (Buspar) 10 mg tablet Take 1 tablet (10 mg) by mouth 3 times a day as needed (anxiety). 8/29/23   Yuan Ortega MD   fenofibrate (Triglide) 160 mg tablet Take 1 tablet (160 mg) by mouth once every day. 6/21/23   Jermaine Raymundo MD   hydroCHLOROthiazide (Microzide) 12.5 mg capsule Take 1 capsule (12.5 mg) by mouth once every day. 6/21/23   Jermaine Raymundo MD   losartan (Cozaar) 25 mg tablet Take 1 tablet (25 mg) by mouth once every day. 6/21/23   Jermaine Raymundo MD       LABS AND IMAGING:     Labs:  Results for orders placed or performed during the hospital encounter of 01/04/24 (from the past 24 hour(s))   Sars-CoV-2 PCR, Symptomatic   Result Value Ref Range    Coronavirus 2019, PCR Not Detected Not Detected   Influenza A, and B PCR   Result Value Ref Range    Flu A Result Not Detected Not Detected    Flu B Result Not Detected Not Detected   RSV PCR   Result Value Ref Range    RSV PCR Not Detected Not Detected   ECG 12 Lead   Result Value Ref Range    Ventricular Rate 93 BPM    Atrial Rate 93 BPM    MT Interval 184 ms    QRS Duration 92 ms    QT Interval 312 ms    QTC Calculation(Bazett) 387 ms     P Axis -21 degrees    R Axis 12 degrees    T Axis -51 degrees    QRS Count 16 beats    Q Onset 224 ms    P Onset 132 ms    P Offset 188 ms    T Offset 380 ms    QTC Fredericia 361 ms   CBC and Auto Differential   Result Value Ref Range    WBC 13.1 (H) 4.4 - 11.3 x10*3/uL    nRBC 0.0 0.0 - 0.0 /100 WBCs    RBC 5.25 4.50 - 5.90 x10*6/uL    Hemoglobin 15.7 13.5 - 17.5 g/dL    Hematocrit 45.2 41.0 - 52.0 %    MCV 86 80 - 100 fL    MCH 29.9 26.0 - 34.0 pg    MCHC 34.7 32.0 - 36.0 g/dL    RDW 13.9 11.5 - 14.5 %    Platelets 212 150 - 450 x10*3/uL    Neutrophils % 67.3 40.0 - 80.0 %    Immature Granulocytes %, Automated 0.2 0.0 - 0.9 %    Lymphocytes % 20.4 13.0 - 44.0 %    Monocytes % 10.2 2.0 - 10.0 %    Eosinophils % 1.4 0.0 - 6.0 %    Basophils % 0.5 0.0 - 2.0 %    Neutrophils Absolute 8.83 (H) 1.20 - 7.70 x10*3/uL    Immature Granulocytes Absolute, Automated 0.03 0.00 - 0.70 x10*3/uL    Lymphocytes Absolute 2.68 1.20 - 4.80 x10*3/uL    Monocytes Absolute 1.34 (H) 0.10 - 1.00 x10*3/uL    Eosinophils Absolute 0.18 0.00 - 0.70 x10*3/uL    Basophils Absolute 0.07 0.00 - 0.10 x10*3/uL   Basic Metabolic Panel   Result Value Ref Range    Glucose 119 (H) 74 - 99 mg/dL    Sodium 136 136 - 145 mmol/L    Potassium 3.2 (L) 3.5 - 5.3 mmol/L    Chloride 100 98 - 107 mmol/L    Bicarbonate 27 21 - 32 mmol/L    Anion Gap 12 10 - 20 mmol/L    Urea Nitrogen 19 6 - 23 mg/dL    Creatinine 0.85 0.50 - 1.30 mg/dL    eGFR >90 >60 mL/min/1.73m*2    Calcium 9.4 8.6 - 10.3 mg/dL   Troponin I, High Sensitivity   Result Value Ref Range    Troponin I, High Sensitivity 5 0 - 20 ng/L   D-Dimer, Quantitative Non VTE   Result Value Ref Range    D-Dimer Non VTE, Quant (ng/mL FEU) <215 <=500 ng/mL FEU   SST TOP   Result Value Ref Range    Extra Tube Hold for add-ons.    Gray Top   Result Value Ref Range    Extra Tube Hold for add-ons.    Blood Gas Arterial Full Panel   Result Value Ref Range    POCT pH, Arterial 7.46 (H) 7.38 - 7.42 pH    POCT pCO2,  Arterial 40 38 - 42 mm Hg    POCT pO2, Arterial 67 (L) 85 - 95 mm Hg    POCT SO2, Arterial 95 94 - 100 %    POCT Oxy Hemoglobin, Arterial 92.3 (L) 94.0 - 98.0 %    POCT Hematocrit Calculated, Arterial 46.0 41.0 - 52.0 %    POCT Sodium, Arterial 134 (L) 136 - 145 mmol/L    POCT Potassium, Arterial 4.4 3.5 - 5.3 mmol/L    POCT Chloride, Arterial 100 98 - 107 mmol/L    POCT Ionized Calcium, Arterial 1.20 1.10 - 1.33 mmol/L    POCT Glucose, Arterial 148 (H) 74 - 99 mg/dL    POCT Lactate, Arterial 1.8 0.4 - 2.0 mmol/L    POCT Base Excess, Arterial 4.2 (H) -2.0 - 3.0 mmol/L    POCT HCO3 Calculated, Arterial 28.4 (H) 22.0 - 26.0 mmol/L    POCT Hemoglobin, Arterial 15.4 13.5 - 17.5 g/dL    POCT Anion Gap, Arterial 10 10 - 25 mmo/L    Patient Temperature      FiO2 21 %    Site of Arterial Puncture Radial Right     Shreyas's Test Positive         Imaging:  XR chest 2 views   Final Result   No focal infiltrate or pneumothorax.        MACRO:   None.        Signed by: Dragan Hong 1/4/2024 3:19 PM   Dictation workstation:   [id number]

## 2024-01-05 NOTE — NURSING NOTE
Informed Dr. Tomas that pts HR has been has high as 130s.  Pt is laying in bed.  Vtach alarm appears to be artifact.

## 2024-01-05 NOTE — PROGRESS NOTES
Medical Group Progress Note  ASSESSMENT & PLAN:     COPD exacerbation  Acute Bronchitis  CXR is clear, lab work fairly unremarkable, prior tx with amoxicillin and oral prednisone taper, failed outpatient therapy.  - supportive treatments  - Levaquin IV 500mg  - duo nebs Q4 and PRN  - steroids, flonase, tesslon pearls      01/05/24  - patient was hypokalemic while and this has improved with potassium replacement.  -  Patient with hypomagnesemia and was given 2 g of magnesium.  -  Patient continues to improve in respiratory status but now having elevated heart rates with intermittent tachycardia.  Suspect that this could be due to DuoNeb treatments. given that patient has failed outpatient treatment.  Hesitant on  resting to discharge patient.  -  Will  monitor patient overnight and if patient remains stable will discharge in the morning.    Dayron Tomas MD    SUBJECTIVE       Patient in no acute distress.  Denies any fever chills nausea or vomiting.  Reports that shortness of breath has improved significantly.     OBJECTIVE:     Last Recorded Vitals:  Vitals:    01/05/24 0726 01/05/24 1200 01/05/24 1318 01/05/24 1643   BP:  118/76  143/73   Pulse:  (!) 111  87   Resp:       Temp:  36.3 °C (97.3 °F)  36.4 °C (97.5 °F)   TempSrc:       SpO2: 92% 92% 92% 92%   Weight:       Height:         Last I/O:  I/O last 3 completed shifts:  In: 100 (1 mL/kg) [IV Piggyback:100]  Out: - (0 mL/kg)   Weight: 102.2 kg     Physical Exam  Constitutional:       Appearance: He is obese.   HENT:      Head: Normocephalic.      Nose: Nose normal.      Mouth/Throat:      Mouth: Mucous membranes are dry.   Eyes:      Extraocular Movements: Extraocular movements intact.      Pupils: Pupils are equal, round, and reactive to light.   Cardiovascular:      Rate and Rhythm: Regular rhythm. Tachycardia present.      Pulses: Normal pulses.      Heart sounds: Normal heart sounds.   Pulmonary:      Effort: Pulmonary effort is normal.       Comments:   Decreased lung sounds in bases bilaterally  Abdominal:      General: Bowel sounds are normal.      Palpations: Abdomen is soft.   Musculoskeletal:         General: No swelling or tenderness.   Skin:     General: Skin is warm and dry.   Neurological:      General: No focal deficit present.      Mental Status: He is alert and oriented to person, place, and time.         Inpatient Medications:  enoxaparin, 40 mg, subcutaneous, q24h  guaiFENesin, 600 mg, oral, BID  ipratropium-albuteroL, 3 mL, nebulization, TID  levoFLOXacin, 500 mg, intravenous, q24h  methylPREDNISolone sodium succinate (PF), 40 mg, intravenous, q8h    PRN Medications  PRN medications: acetaminophen **OR** acetaminophen **OR** acetaminophen, ipratropium-albuteroL, polyethylene glycol  Continuous Medications:     LABS AND IMAGING:     Labs:  Results from last 7 days   Lab Units 01/04/24  1612   WBC AUTO x10*3/uL 13.1*   RBC AUTO x10*6/uL 5.25   HEMOGLOBIN g/dL 15.7   HEMATOCRIT % 45.2   MCV fL 86   MCH pg 29.9   MCHC g/dL 34.7   RDW % 13.9   PLATELETS AUTO x10*3/uL 212     Results from last 7 days   Lab Units 01/05/24  1432 01/04/24  1612   SODIUM mmol/L 135* 136   POTASSIUM mmol/L 4.2 3.2*   CHLORIDE mmol/L 100 100   CO2 mmol/L 24 27   BUN mg/dL 21 19   CREATININE mg/dL 0.86 0.85   GLUCOSE mg/dL 343* 119*   CALCIUM mg/dL 9.8 9.4     Results from last 7 days   Lab Units 01/04/24  1613   MAGNESIUM mg/dL 1.65     Results from last 7 days   Lab Units 01/04/24  1612   TROPHS ng/L 5     Imaging:  ECG 12 Lead  Sinus tachycardia with Premature atrial complexes with Aberrant conduction  Septal infarct (cited on or before 04-JAN-2024)  Abnormal ECG  When compared with ECG of 04-JAN-2024 16:04,  Aberrant conduction is now Present  QT has shortened

## 2024-01-06 ENCOUNTER — APPOINTMENT (OUTPATIENT)
Dept: CARDIOLOGY | Facility: HOSPITAL | Age: 59
End: 2024-01-06
Payer: COMMERCIAL

## 2024-01-06 VITALS
RESPIRATION RATE: 16 BRPM | OXYGEN SATURATION: 92 % | SYSTOLIC BLOOD PRESSURE: 142 MMHG | BODY MASS INDEX: 34.15 KG/M2 | WEIGHT: 225.31 LBS | TEMPERATURE: 97.8 F | HEIGHT: 68 IN | DIASTOLIC BLOOD PRESSURE: 67 MMHG | HEART RATE: 101 BPM

## 2024-01-06 LAB
ATRIAL RATE: 98 BPM
BNP SERPL-MCNC: 111 PG/ML (ref 0–99)
CARDIAC TROPONIN I PNL SERPL HS: 4 NG/L (ref 0–20)
P AXIS: 21 DEGREES
P OFFSET: 195 MS
P ONSET: 137 MS
PR INTERVAL: 178 MS
Q ONSET: 226 MS
QRS COUNT: 16 BEATS
QRS DURATION: 88 MS
QT INTERVAL: 300 MS
QTC CALCULATION(BAZETT): 383 MS
QTC FREDERICIA: 353 MS
R AXIS: 0 DEGREES
T AXIS: 22 DEGREES
T OFFSET: 376 MS
VENTRICULAR RATE: 98 BPM

## 2024-01-06 PROCEDURE — 2500000002 HC RX 250 W HCPCS SELF ADMINISTERED DRUGS (ALT 637 FOR MEDICARE OP, ALT 636 FOR OP/ED): Performed by: HOSPITALIST

## 2024-01-06 PROCEDURE — 2500000004 HC RX 250 GENERAL PHARMACY W/ HCPCS (ALT 636 FOR OP/ED): Performed by: STUDENT IN AN ORGANIZED HEALTH CARE EDUCATION/TRAINING PROGRAM

## 2024-01-06 PROCEDURE — 2500000002 HC RX 250 W HCPCS SELF ADMINISTERED DRUGS (ALT 637 FOR MEDICARE OP, ALT 636 FOR OP/ED): Performed by: STUDENT IN AN ORGANIZED HEALTH CARE EDUCATION/TRAINING PROGRAM

## 2024-01-06 PROCEDURE — 94640 AIRWAY INHALATION TREATMENT: CPT

## 2024-01-06 PROCEDURE — 96366 THER/PROPH/DIAG IV INF ADDON: CPT

## 2024-01-06 PROCEDURE — 36415 COLL VENOUS BLD VENIPUNCTURE: CPT | Performed by: HOSPITALIST

## 2024-01-06 PROCEDURE — 99239 HOSP IP/OBS DSCHRG MGMT >30: CPT | Performed by: HOSPITALIST

## 2024-01-06 PROCEDURE — 96376 TX/PRO/DX INJ SAME DRUG ADON: CPT

## 2024-01-06 PROCEDURE — G0378 HOSPITAL OBSERVATION PER HR: HCPCS

## 2024-01-06 PROCEDURE — 93010 ELECTROCARDIOGRAM REPORT: CPT | Performed by: INTERNAL MEDICINE

## 2024-01-06 PROCEDURE — 93005 ELECTROCARDIOGRAM TRACING: CPT

## 2024-01-06 PROCEDURE — 83880 ASSAY OF NATRIURETIC PEPTIDE: CPT | Performed by: HOSPITALIST

## 2024-01-06 RX ORDER — IPRATROPIUM BROMIDE AND ALBUTEROL SULFATE 2.5; .5 MG/3ML; MG/3ML
3 SOLUTION RESPIRATORY (INHALATION) EVERY 4 HOURS PRN
Qty: 180 ML | Refills: 0 | Status: SHIPPED | OUTPATIENT
Start: 2024-01-06 | End: 2024-01-06 | Stop reason: HOSPADM

## 2024-01-06 RX ORDER — FLUTICASONE FUROATE AND VILANTEROL 100; 25 UG/1; UG/1
1 POWDER RESPIRATORY (INHALATION) DAILY
Qty: 1 EACH | Refills: 0 | Status: SHIPPED | OUTPATIENT
Start: 2024-01-06 | End: 2024-02-27 | Stop reason: SDUPTHER

## 2024-01-06 RX ORDER — PREDNISONE 20 MG/1
TABLET ORAL
Qty: 9 TABLET | Refills: 0 | Status: SHIPPED | OUTPATIENT
Start: 2024-01-06 | End: 2024-01-16 | Stop reason: ALTCHOICE

## 2024-01-06 RX ORDER — FLUTICASONE FUROATE AND VILANTEROL 100; 25 UG/1; UG/1
1 POWDER RESPIRATORY (INHALATION) DAILY
Status: DISCONTINUED | OUTPATIENT
Start: 2024-01-06 | End: 2024-01-06 | Stop reason: HOSPADM

## 2024-01-06 RX ORDER — DOXYCYCLINE 100 MG/1
100 CAPSULE ORAL 2 TIMES DAILY
Qty: 10 CAPSULE | Refills: 0 | Status: SHIPPED | OUTPATIENT
Start: 2024-01-06 | End: 2024-01-16 | Stop reason: WASHOUT

## 2024-01-06 RX ADMIN — GUAIFENESIN 600 MG: 600 TABLET, EXTENDED RELEASE ORAL at 08:26

## 2024-01-06 RX ADMIN — LEVOFLOXACIN 500 MG: 5 INJECTION, SOLUTION INTRAVENOUS at 08:26

## 2024-01-06 RX ADMIN — IPRATROPIUM BROMIDE AND ALBUTEROL SULFATE 3 ML: 2.5; .5 SOLUTION RESPIRATORY (INHALATION) at 07:34

## 2024-01-06 RX ADMIN — METHYLPREDNISOLONE SODIUM SUCCINATE 40 MG: 40 INJECTION, POWDER, FOR SOLUTION INTRAMUSCULAR; INTRAVENOUS at 05:53

## 2024-01-06 RX ADMIN — FLUTICASONE FUROATE AND VILANTEROL TRIFENATATE 1 PUFF: 100; 25 POWDER RESPIRATORY (INHALATION) at 12:00

## 2024-01-06 ASSESSMENT — COGNITIVE AND FUNCTIONAL STATUS - GENERAL
MOBILITY SCORE: 24
DAILY ACTIVITIY SCORE: 24

## 2024-01-06 ASSESSMENT — PAIN SCALES - GENERAL
PAINLEVEL_OUTOF10: 0 - NO PAIN

## 2024-01-06 ASSESSMENT — PAIN SCALES - WONG BAKER: WONGBAKER_NUMERICALRESPONSE: NO HURT

## 2024-01-06 ASSESSMENT — PAIN - FUNCTIONAL ASSESSMENT
PAIN_FUNCTIONAL_ASSESSMENT: 0-10
PAIN_FUNCTIONAL_ASSESSMENT: 0-10

## 2024-01-06 NOTE — DISCHARGE INSTRUCTIONS
It was nice caring for you during your stay at [facility]. As we discussed,  -Please continue prednisone taper as instructed.  Please continue doxycycline.  -Please follow-up with your primary care doctor in regards to a stress test and an echocardiogram.      Wishing you a healthy recovery!

## 2024-01-06 NOTE — DISCHARGE SUMMARY
DISCHARGE DIAGNOSIS     Acute exacerbation of bronchitis  Hypertension  Hyperlipidemia      HOSPITAL COURSE AND DETAILS     Mr. Massey is a 58-year-old with past medical history of hypertension, hyperlipidemia who presented to the emergency room with worsening cough.    He states that his symptoms started several weeks ago, started having worsening chest pain and shortness of breath, he was treated with amoxicillin, prednisone.    Here, started on Solu-Medrol, nebulizer treatment.  Chest x-ray showed no acute evidence of any abnormalities.  He was noted to be tachycardic, most likely related to his illness as well as nebulizer treatments.  Was discharged on a prednisone taper and doxycycline.    His ECG on admission started normal sinus rhythm with lateral ST changes.  Initial troponin was negative, his repeat troponin on the day of discharge was negative.  I did advise him that he would need to follow-up with his primary care doctor for a stress test.        Total time spent on discharge planning and coordination of care 40 minutes.  Discharge planning was discussed with patient, she understands and agrees with plan of care.    * Some of these notes were completed using Dragon voice recognition technology and may include unintended areas with respect to translation of word, typographical errors or primary areas which may not have been identified prior to finalization of the document.      DISCHARGE PHYSICAL EXAM     Last Recorded Vitals:  Vitals:    01/06/24 0322 01/06/24 0734 01/06/24 0817 01/06/24 0851   BP: 166/87  142/67    Patient Position:       Pulse: 101  101    Resp:       Temp: 37 °C (98.6 °F)   36.6 °C (97.8 °F)   TempSrc:       SpO2: 95% 94% 92%    Weight:       Height:           Physical Exam  PHYSICAL EXAM:   GENERAL: Laying in bed, does not appear to be in any distress.   HEENT: HEAD: Normocephalic atraumatic.  Neck: Supple.  Eyes: Pupils are reactive to direct light.   CVS: S1, S2 heard. Regular  rate and rhythm  LUNGS: Clear to auscultate bilaterally. No wheezing or rhonchi appreciated.  ABDOMEN: Soft, nontender to palpate. Positive bowel sounds. No guarding or rebound appreciated.  NEUROLOGICAL: No focal neurological deficits appreciated. Cranial nerves are grossly intact.  EXTREMITIES: Dorsalis pedis pulses can be appreciated. No edema appreciated.  SKIN:  Grossly intact, warm and dry.    DISCHARGE MEDICATIONS        Your medication list        START taking these medications        Instructions Last Dose Given Next Dose Due   doxycycline 100 mg capsule  Commonly known as: Vibramycin      Take 1 capsule (100 mg) by mouth 2 times a day for 5 days. Take with at least 8 ounces (large glass) of water, do not lie down for 30 minutes after       fluticasone furoate-vilanteroL 100-25 mcg/dose inhaler  Commonly known as: Breo Ellipta      Inhale 1 puff once daily.       predniSONE 20 mg tablet  Commonly known as: Deltasone  Start taking on: January 6, 2024      Take 2 tablets (40 mg) by mouth once daily for 2 days, THEN 1 tablet (20 mg) once daily for 3 days, THEN 0.5 tablets (10 mg) once daily for 3 days.              CONTINUE taking these medications        Instructions Last Dose Given Next Dose Due   busPIRone 10 mg tablet  Commonly known as: Buspar      Take 1 tablet (10 mg) by mouth 3 times a day as needed (anxiety).       fenofibrate 160 mg tablet  Commonly known as: Triglide      Take 1 tablet (160 mg) by mouth once every day.       hydroCHLOROthiazide 12.5 mg capsule  Commonly known as: Microzide      Take 1 capsule (12.5 mg) by mouth once every day.       losartan 25 mg tablet  Commonly known as: Cozaar      Take 1 tablet (25 mg) by mouth once every day.                 Where to Get Your Medications        These medications were sent to [id number] - [address] [address]      Phone: [phone]   doxycycline 100 mg  capsule  fluticasone furoate-vilanteroL 100-25 mcg/dose inhaler  predniSONE 20 mg tablet           OUTPATIENT FOLLOW-UP     Future Appointments   Date Time Provider Department Center   2/29/2024  8:15 AM Yuan Ortega MD [id number]

## 2024-01-08 ENCOUNTER — PATIENT OUTREACH (OUTPATIENT)
Dept: PRIMARY CARE | Facility: CLINIC | Age: 59
End: 2024-01-08
Payer: COMMERCIAL

## 2024-01-08 ENCOUNTER — TELEPHONE (OUTPATIENT)
Dept: PRIMARY CARE | Facility: CLINIC | Age: 59
End: 2024-01-08
Payer: COMMERCIAL

## 2024-01-08 NOTE — TELEPHONE ENCOUNTER
Patients wife Sari called in stating the patient was hospitalized on 1/4 for SOB. She stated the patient was advised to see their PCP ASAP and was recommended to see a pulmonologist and cardiologist. Sari stated the patients oxygen levels are still very low and is needing to see Dr. Ortega this week. Dr. Ortega's next opening for a hospital discharge is 1/18. Sari stated this is way too far out and the patient could not wait this long. I advised I would get in a message to see what we could do for him. Sari is wondering if another provider could see the patient for this appointment? I advised I could ask but could not guarantee it. Sari stated they will be finding a new doctor as they cannot wait for a response and cannot wait until 1/18 to be seen  Please Advise

## 2024-01-08 NOTE — TELEPHONE ENCOUNTER
We schedulued dyllan for a hos follow up on 1/19 but his wife wants sooner specially because his o2 levels have dropped again and his heart rate has spiked and she is just worried but he doesn't want to go back to the er because they said they couldn't do anything for him he had to see his pcp.

## 2024-01-08 NOTE — PROGRESS NOTES
Discharge Facility: [facility]  Discharge Diagnosis: Acute exacerbation of bronchitis, Hypertension, Hyperlipidemia  Admission Date: 1/4/24  Discharge Date:  1/6/24    PCP Appointment Date: 1/18/24  Specialist Appointment Date: N/A  Hospital Encounter and Summary: Linked   See discharge assessment below for further details: N/A    TCM call completed, however was unable to reach patient during two business days after discharge despite at least two attempts made.

## 2024-01-09 ENCOUNTER — TELEPHONE (OUTPATIENT)
Dept: PRIMARY CARE | Facility: CLINIC | Age: 59
End: 2024-01-09
Payer: COMMERCIAL

## 2024-01-09 DIAGNOSIS — R09.89: Primary | ICD-10-CM

## 2024-01-09 NOTE — TELEPHONE ENCOUNTER
Tomás from [facility] pharmacy in [place] (new pharmacy for patient) called in stating patient was recently discharged from the hospital with a nebulizer but wasn't prescribed solution for a nebulizer. Patient asked pharmacy if they could reach out to Dr. Ortega and request a prescription for nebulizer solution. Please advise.     Lov: 8/29  Future appointment: 1/19 (hospital discharge)

## 2024-01-12 DIAGNOSIS — J44.1 COPD EXACERBATION (MULTI): Primary | ICD-10-CM

## 2024-01-15 RX ORDER — IPRATROPIUM BROMIDE AND ALBUTEROL SULFATE 2.5; .5 MG/3ML; MG/3ML
3 SOLUTION RESPIRATORY (INHALATION) 4 TIMES DAILY PRN
Qty: 360 ML | Refills: 3 | Status: SHIPPED | OUTPATIENT
Start: 2024-01-15 | End: 2024-02-02 | Stop reason: WASHOUT

## 2024-01-16 ENCOUNTER — OFFICE VISIT (OUTPATIENT)
Dept: PRIMARY CARE | Facility: CLINIC | Age: 59
End: 2024-01-16
Payer: COMMERCIAL

## 2024-01-16 VITALS
BODY MASS INDEX: 35.13 KG/M2 | HEART RATE: 65 BPM | OXYGEN SATURATION: 97 % | SYSTOLIC BLOOD PRESSURE: 124 MMHG | RESPIRATION RATE: 13 BRPM | TEMPERATURE: 97.5 F | DIASTOLIC BLOOD PRESSURE: 86 MMHG | WEIGHT: 231.8 LBS | HEIGHT: 68 IN

## 2024-01-16 DIAGNOSIS — E66.09 CLASS 2 OBESITY DUE TO EXCESS CALORIES WITHOUT SERIOUS COMORBIDITY WITH BODY MASS INDEX (BMI) OF 35.0 TO 35.9 IN ADULT: ICD-10-CM

## 2024-01-16 DIAGNOSIS — I10 PRIMARY HYPERTENSION: Primary | ICD-10-CM

## 2024-01-16 DIAGNOSIS — E78.2 MIXED HYPERLIPIDEMIA: ICD-10-CM

## 2024-01-16 DIAGNOSIS — J96.01 ACUTE RESPIRATORY FAILURE WITH HYPOXIA (MULTI): ICD-10-CM

## 2024-01-16 PROBLEM — Z86.010 HISTORY OF COLON POLYPS: Chronic | Status: RESOLVED | Noted: 2019-06-06 | Resolved: 2024-01-16

## 2024-01-16 PROBLEM — Z86.0100 HISTORY OF COLON POLYPS: Chronic | Status: RESOLVED | Noted: 2019-06-06 | Resolved: 2024-01-16

## 2024-01-16 PROBLEM — J06.9 UPPER RESPIRATORY TRACT INFECTION: Status: ACTIVE | Noted: 2024-01-16

## 2024-01-16 PROBLEM — R06.02 SOB (SHORTNESS OF BREATH) ON EXERTION: Status: ACTIVE | Noted: 2024-01-16

## 2024-01-16 PROCEDURE — 3074F SYST BP LT 130 MM HG: CPT | Performed by: FAMILY MEDICINE

## 2024-01-16 PROCEDURE — 3008F BODY MASS INDEX DOCD: CPT | Performed by: FAMILY MEDICINE

## 2024-01-16 PROCEDURE — 99495 TRANSJ CARE MGMT MOD F2F 14D: CPT | Performed by: FAMILY MEDICINE

## 2024-01-16 PROCEDURE — 3079F DIAST BP 80-89 MM HG: CPT | Performed by: FAMILY MEDICINE

## 2024-01-16 PROCEDURE — 1036F TOBACCO NON-USER: CPT | Performed by: FAMILY MEDICINE

## 2024-01-16 NOTE — PROGRESS NOTES
"Subjective    Patient ID: Charlie Massey is a 58 y.o. male who presents for Hospital Follow-up (Sick for over a month, 3 rounds of antibiotics and steroids, heart rate high, oxygen low).     Hospital Discharge: The patient is presenting today for a follow up from a Hospital visit on  01/04/2024  for shortness of breath.      Discharge Facility: [facility]  Discharge Diagnosis: Acute exacerbation of bronchitis, Hypertension, Hyperlipidemia  Admission Date: 1/4/24  Discharge Date:  1/6/24     PCP Appointment Date: 1/16/24  Specialist Appointment Date: N/A    Shortness of Breath of bronchitis: The patient is present today for a follow up of SOB of bronchitis. The patient reports that he has been sick for approximately over a month. He voices that he has had 3 rounds of antibiotics with steroids with minimal relief. The patient has tachycardia with low oxygen at home. In clinic he is stable today.     Hypertension: The patient is here for follow-up of elevated blood pressure. He is adherent to a low salt diet. Blood pressure is well controlled at home. No new myalgias or GI upset on diet control.    Hyperlipidemia: The patient is present today for a follow up of hyperlipidemia. He denies having any leg cramping in particular. He is trying to follow a low-fat diet.     Obesity: The patient is present for a follow up for obesity. The patient is continuously working on diet and exercise. The patient will continue working on strategies to maintain weight loss and stay well hydrated.         The patient denies having the following symptoms: chest pain, chest pressure, fever, chills, N/V/D, constipation, dizziness, headaches, SOB.    Objective   Vitals:  /86   Pulse 65   Temp 36.4 °C (97.5 °F)   Resp 13   Ht 1.727 m (5' 8\")   Wt 105 kg (231 lb 12.8 oz)   SpO2 97%   BMI 35.25 kg/m²     Physical Exam  Vitals reviewed.   Constitutional:       Appearance: Normal appearance. He is obese.   Neck:      Vascular: No " carotid bruit.   Cardiovascular:      Rate and Rhythm: Normal rate and regular rhythm.      Pulses: Normal pulses.      Heart sounds: Normal heart sounds.   Pulmonary:      Effort: Pulmonary effort is normal. No respiratory distress.      Breath sounds: Normal breath sounds. No wheezing.   Abdominal:      General: There is no distension.      Palpations: Abdomen is soft. There is no mass.      Tenderness: There is no abdominal tenderness. There is no right CVA tenderness, left CVA tenderness, guarding or rebound.   Musculoskeletal:      Cervical back: Normal range of motion and neck supple. No rigidity.      Right lower leg: No edema.      Left lower leg: No edema.   Lymphadenopathy:      Cervical: No cervical adenopathy.   Neurological:      Mental Status: He is alert.            Labs reviewed from : 1/5/2024 CMP, CBC, Lipid, Glucose 343; sodium 135; ; ; Triglycerides 168    Chest XR 1/4/2024 WNL    Assessment/Plan   Problem List Items Addressed This Visit       Hyperlipidemia     Is clinically stable so we will continue with current medications and lab work to confirm status ordered.          Hypertension - Primary      Is stable, continue with current treatment.          Class 2 obesity due to excess calories without serious comorbidity with body mass index (BMI) of 35.0 to 35.9 in adult     The patient is continuously working on diet and exercise. The patient will continue working on strategies to maintain weight loss and stay well hydrated.            Acute respiratory failure with hypoxia (CMS/HCC)      This condition is poorly controlled, therapeutic changes necessary.             Follow up in: 02/29/2024 as scheduled or sooner if needed     Scribe Attestation  By signing my name below, IMeliza , Scribe   attest that this documentation has been prepared under the direction and in the presence of Yuan Ortega MD.

## 2024-01-18 ENCOUNTER — APPOINTMENT (OUTPATIENT)
Dept: PRIMARY CARE | Facility: CLINIC | Age: 59
End: 2024-01-18
Payer: COMMERCIAL

## 2024-01-19 ENCOUNTER — HOSPITAL ENCOUNTER (OUTPATIENT)
Dept: RESPIRATORY THERAPY | Facility: HOSPITAL | Age: 59
Discharge: HOME | End: 2024-01-19
Payer: COMMERCIAL

## 2024-01-19 ENCOUNTER — APPOINTMENT (OUTPATIENT)
Dept: PRIMARY CARE | Facility: CLINIC | Age: 59
End: 2024-01-19
Payer: COMMERCIAL

## 2024-01-19 DIAGNOSIS — J96.01 ACUTE RESPIRATORY FAILURE WITH HYPOXIA (MULTI): ICD-10-CM

## 2024-01-19 LAB
MGC ASCENT PFT - FEV1 - PRE: 3.09
MGC ASCENT PFT - FEV1 - PREDICTED: 3.13
MGC ASCENT PFT - FVC - PRE: 3.65
MGC ASCENT PFT - FVC - PREDICTED: 4

## 2024-01-22 ENCOUNTER — TELEPHONE (OUTPATIENT)
Dept: PRIMARY CARE | Facility: CLINIC | Age: 59
End: 2024-01-22

## 2024-01-22 RX ORDER — GUAIFENESIN 600 MG/1
600 TABLET, EXTENDED RELEASE ORAL EVERY 12 HOURS
COMMUNITY
Start: 2024-01-04 | End: 2024-02-02 | Stop reason: WASHOUT

## 2024-01-22 RX ORDER — ENOXAPARIN SODIUM 100 MG/ML
40 INJECTION SUBCUTANEOUS
COMMUNITY
Start: 2024-01-05 | End: 2024-02-02 | Stop reason: WASHOUT

## 2024-01-22 RX ORDER — POLYETHYLENE GLYCOL 3350 17 G/17G
17 POWDER, FOR SOLUTION ORAL DAILY PRN
COMMUNITY
Start: 2024-01-04 | End: 2024-02-02 | Stop reason: WASHOUT

## 2024-01-22 NOTE — TELEPHONE ENCOUNTER
We received a request for prior authorization on the patient's   ipratropium-albuteroL (Duo-Neb) 0.5-2.5 mg/3 mL nebulizer solution  from their pharmacy. Prior authorization was submitted to insurance today. We will await their determination.

## 2024-01-23 ENCOUNTER — PATIENT OUTREACH (OUTPATIENT)
Dept: PRIMARY CARE | Facility: CLINIC | Age: 59
End: 2024-01-23
Payer: COMMERCIAL

## 2024-01-23 NOTE — PROGRESS NOTES
Unable to reach patient for call back after patient's follow up appointment with PCP.   CHACHOM with call back number for patient to call if needed   If no voicemail available call attempts x 2 were made to contact the patient to assist with any questions or concerns patient may have.

## 2024-02-02 ENCOUNTER — OFFICE VISIT (OUTPATIENT)
Dept: PRIMARY CARE | Facility: CLINIC | Age: 59
End: 2024-02-02
Payer: COMMERCIAL

## 2024-02-02 VITALS
SYSTOLIC BLOOD PRESSURE: 126 MMHG | TEMPERATURE: 98.1 F | DIASTOLIC BLOOD PRESSURE: 86 MMHG | WEIGHT: 229 LBS | BODY MASS INDEX: 34.71 KG/M2 | HEIGHT: 68 IN | HEART RATE: 80 BPM

## 2024-02-02 DIAGNOSIS — R06.00 DYSPNEA, UNSPECIFIED TYPE: ICD-10-CM

## 2024-02-02 DIAGNOSIS — R94.31 ECG ABNORMALITY: ICD-10-CM

## 2024-02-02 DIAGNOSIS — M19.90 GENERALIZED ARTHRITIS: ICD-10-CM

## 2024-02-02 DIAGNOSIS — I10 PRIMARY HYPERTENSION: ICD-10-CM

## 2024-02-02 DIAGNOSIS — J44.9 CHRONIC OBSTRUCTIVE PULMONARY DISEASE, UNSPECIFIED COPD TYPE (MULTI): ICD-10-CM

## 2024-02-02 DIAGNOSIS — R73.03 PRE-DIABETES: ICD-10-CM

## 2024-02-02 DIAGNOSIS — F41.1 GENERALIZED ANXIETY DISORDER: Primary | ICD-10-CM

## 2024-02-02 DIAGNOSIS — E78.2 MIXED HYPERLIPIDEMIA: ICD-10-CM

## 2024-02-02 PROCEDURE — 3079F DIAST BP 80-89 MM HG: CPT | Performed by: FAMILY MEDICINE

## 2024-02-02 PROCEDURE — 3008F BODY MASS INDEX DOCD: CPT | Performed by: FAMILY MEDICINE

## 2024-02-02 PROCEDURE — 3074F SYST BP LT 130 MM HG: CPT | Performed by: FAMILY MEDICINE

## 2024-02-02 PROCEDURE — 99214 OFFICE O/P EST MOD 30 MIN: CPT | Performed by: FAMILY MEDICINE

## 2024-02-02 PROCEDURE — 1036F TOBACCO NON-USER: CPT | Performed by: FAMILY MEDICINE

## 2024-02-02 RX ORDER — HYDROCHLOROTHIAZIDE 12.5 MG/1
12.5 CAPSULE ORAL
Qty: 90 CAPSULE | Refills: 0 | Status: SHIPPED | OUTPATIENT
Start: 2024-02-02

## 2024-02-02 RX ORDER — LOSARTAN POTASSIUM 25 MG/1
25 TABLET ORAL
Qty: 90 TABLET | Refills: 0 | Status: SHIPPED | OUTPATIENT
Start: 2024-02-02

## 2024-02-02 RX ORDER — ALBUTEROL SULFATE 90 UG/1
2 AEROSOL, METERED RESPIRATORY (INHALATION) EVERY 4 HOURS PRN
Qty: 8.5 G | Refills: 0 | Status: SHIPPED | OUTPATIENT
Start: 2024-02-02 | End: 2025-02-01

## 2024-02-02 RX ORDER — FENOFIBRATE 160 MG/1
160 TABLET ORAL
Qty: 90 TABLET | Refills: 0 | Status: SHIPPED | OUTPATIENT
Start: 2024-02-02

## 2024-02-02 RX ORDER — FLUTICASONE PROPIONATE 50 MCG
1 SPRAY, SUSPENSION (ML) NASAL DAILY
Qty: 16 G | Refills: 1 | Status: SHIPPED | OUTPATIENT
Start: 2024-02-02

## 2024-02-02 RX ORDER — FLUTICASONE PROPIONATE 50 MCG
1 SPRAY, SUSPENSION (ML) NASAL DAILY
COMMUNITY
End: 2024-02-02 | Stop reason: SDUPTHER

## 2024-02-02 ASSESSMENT — PATIENT HEALTH QUESTIONNAIRE - PHQ9
1. LITTLE INTEREST OR PLEASURE IN DOING THINGS: NOT AT ALL
SUM OF ALL RESPONSES TO PHQ9 QUESTIONS 1 AND 2: 0
2. FEELING DOWN, DEPRESSED OR HOPELESS: NOT AT ALL

## 2024-02-02 ASSESSMENT — ENCOUNTER SYMPTOMS
DEPRESSION: 0
FEVER: 0
UNEXPECTED WEIGHT CHANGE: 0
AGITATION: 0
PALPITATIONS: 0
WHEEZING: 0
MYALGIAS: 0
COUGH: 0
SORE THROAT: 0
CONSTIPATION: 0
HEADACHES: 0
NERVOUS/ANXIOUS: 0
NAUSEA: 0
ABDOMINAL PAIN: 0
LIGHT-HEADEDNESS: 0
ARTHRALGIAS: 0
FATIGUE: 0
SHORTNESS OF BREATH: 0
HEMATURIA: 0
JOINT SWELLING: 0
FLANK PAIN: 0
DYSURIA: 0
DIARRHEA: 0

## 2024-02-02 ASSESSMENT — COLUMBIA-SUICIDE SEVERITY RATING SCALE - C-SSRS
6. HAVE YOU EVER DONE ANYTHING, STARTED TO DO ANYTHING, OR PREPARED TO DO ANYTHING TO END YOUR LIFE?: NO
1. IN THE PAST MONTH, HAVE YOU WISHED YOU WERE DEAD OR WISHED YOU COULD GO TO SLEEP AND NOT WAKE UP?: NO
2. HAVE YOU ACTUALLY HAD ANY THOUGHTS OF KILLING YOURSELF?: NO

## 2024-02-02 NOTE — PROGRESS NOTES
Subjective   Chief complaint: Charlie Massey is a 58 y.o. male who presents for New Patient Visit, Abnormal ECG, and Anxiety.    HPI:  Anxiety  Patient reports no chest pain, nausea, nervous/anxious behavior, palpitations or shortness of breath.       To establish.  Difficulty breathing.  He had an course of hospitalization.  Medical records reviewed.  He had prolonged course of cough congestion.  Several different antibiotics.  Steroids.  Was admitted.  Had abnormal EKG.  Minutes continued dyspnea.  Underwent pulmonary function testing.  Results reviewed.  Hyperinflation.  He is on Breo.  Minimal relief.  Will add bronchodilator rescue.  Other medications are reconciled.  Will do laboratory assessment.  Will continue his current medical therapy.  Will proceed with echo and stress test.  Short-term follow-up.  Further treat recommendations pending clinical course and test results.    Past Medical History:   Diagnosis Date    History of colon polyps 06/06/2019    Other allergic rhinitis 02/18/2020    Perennial allergic rhinitis    Plantar fascial fibromatosis 03/06/2020    Plantar fasciitis of left foot    Ventral hernia without obstruction or gangrene 04/06/2021    Ventral hernia without obstruction or gangrene      Past Surgical History:   Procedure Laterality Date    KNEE SURGERY Left     OTHER SURGICAL HISTORY  02/18/2020    Hernia repair      Family History   Problem Relation Name Age of Onset    Diabetes type II Mother      Other (cardiac disorder) Father      Hypertension Father        Social History     Socioeconomic History    Marital status:      Spouse name: Not on file    Number of children: Not on file    Years of education: Not on file    Highest education level: Not on file   Occupational History    Not on file   Tobacco Use    Smoking status: Former     Types: Cigarettes    Smokeless tobacco: Never   Substance and Sexual Activity    Alcohol use: Never    Drug use: Never    Sexual activity:  "Not on file   Other Topics Concern    Not on file   Social History Narrative    Not on file     Social Determinants of Health     Financial Resource Strain: Low Risk  (1/4/2024)    Overall Financial Resource Strain (CARDIA)     Difficulty of Paying Living Expenses: Not hard at all   Food Insecurity: Not on file   Transportation Needs: No Transportation Needs (1/4/2024)    PRAPARE - Transportation     Lack of Transportation (Medical): No     Lack of Transportation (Non-Medical): No   Physical Activity: Not on file   Stress: Not on file   Social Connections: Not on file   Intimate Partner Violence: Not on file   Housing Stability: Low Risk  (1/4/2024)    Housing Stability Vital Sign     Unable to Pay for Housing in the Last Year: No     Number of Places Lived in the Last Year: 1     Unstable Housing in the Last Year: No      Objective   /86 (BP Location: Right arm, Patient Position: Sitting, BP Cuff Size: Large adult)   Pulse 80   Temp 36.7 °C (98.1 °F) (Temporal)   Ht 1.727 m (5' 8\")   Wt 104 kg (229 lb)   BMI 34.82 kg/m²   Physical Exam  Vitals and nursing note reviewed.   Constitutional:       General: He is not in acute distress.     Appearance: He is not ill-appearing or toxic-appearing.   HENT:      Head: Normocephalic and atraumatic.      Mouth/Throat:      Pharynx: No oropharyngeal exudate or posterior oropharyngeal erythema.   Eyes:      Extraocular Movements: Extraocular movements intact.      Conjunctiva/sclera: Conjunctivae normal.      Pupils: Pupils are equal, round, and reactive to light.   Cardiovascular:      Rate and Rhythm: Normal rate and regular rhythm.      Pulses: Normal pulses.      Heart sounds: Normal heart sounds. No murmur heard.  Pulmonary:      Effort: Pulmonary effort is normal.      Breath sounds: Normal breath sounds. No wheezing, rhonchi or rales.   Abdominal:      General: Abdomen is flat. Bowel sounds are normal. There is no distension.      Palpations: Abdomen is soft. " There is no mass.      Tenderness: There is no abdominal tenderness.      Hernia: No hernia is present.   Musculoskeletal:         General: No swelling or deformity. Normal range of motion.      Cervical back: Normal range of motion and neck supple.   Skin:     General: Skin is warm and dry.      Capillary Refill: Capillary refill takes less than 2 seconds.      Coloration: Skin is not jaundiced.      Findings: No erythema or rash.   Neurological:      General: No focal deficit present.      Mental Status: He is oriented to person, place, and time. Mental status is at baseline.   Psychiatric:         Mood and Affect: Mood normal.         Behavior: Behavior normal.         Thought Content: Thought content normal.         Judgment: Judgment normal.       Review of Systems   Constitutional:  Negative for fatigue, fever and unexpected weight change.   HENT:  Negative for congestion and sore throat.    Respiratory:  Negative for cough, shortness of breath and wheezing.    Cardiovascular:  Negative for chest pain, palpitations and leg swelling.   Gastrointestinal:  Negative for abdominal pain, constipation, diarrhea and nausea.   Genitourinary:  Negative for dysuria, flank pain and hematuria.   Musculoskeletal:  Negative for arthralgias, joint swelling and myalgias.   Skin:  Negative for rash.   Neurological:  Negative for syncope, light-headedness and headaches.   Psychiatric/Behavioral:  Negative for agitation. The patient is not nervous/anxious.       I have reviewed and reconciled the medication list with the patient today.   Current Outpatient Medications:     fluticasone furoate-vilanteroL (Breo Ellipta) 100-25 mcg/dose inhaler, Inhale 1 puff once daily., Disp: 1 each, Rfl: 0    albuterol (ProAir HFA) 90 mcg/actuation inhaler, Inhale 2 puffs every 4 hours if needed for wheezing or shortness of breath., Disp: 8.5 g, Rfl: 0    fenofibrate (Triglide) 160 mg tablet, Take 1 tablet (160 mg) by mouth once every day., Disp:  90 tablet, Rfl: 0    fluticasone (Flonase) 50 mcg/actuation nasal spray, Administer 1 spray into each nostril once daily. Shake gently. Before first use, prime pump. After use, clean tip and replace cap., Disp: 16 g, Rfl: 1    hydroCHLOROthiazide (Microzide) 12.5 mg capsule, Take 1 capsule (12.5 mg) by mouth once every day., Disp: 90 capsule, Rfl: 0    losartan (Cozaar) 25 mg tablet, Take 1 tablet (25 mg) by mouth once every day., Disp: 90 tablet, Rfl: 0     Imaging:  Complete Pulmonary Function Test Pre/Post Bronchodialator (Spirometry Pre/Post/DLCO/Lung Volumes)    Result Date: 1/19/2024  The FEV1/FVC is normal. The FEV1 is normal. The FVC is normal. The TLC by body plethysmography is normal. The DLCO is normal; however, the diffusing capacity was not corrected for the patient's hemoglobin. The airways resistance is normal. Conclusion: Normal spirometry. Lung volumes by plethysmography indicate possible hyperinflation process. The DLCO is normal.    ECG 12 lead    Result Date: 1/6/2024  Normal sinus rhythm Anterior infarct (cited on or before 04-JAN-2024) Abnormal ECG When compared with ECG of 05-JAN-2024 16:41, Aberrant conduction is no longer Present Questionable change in initial forces of Anteroseptal leads T wave inversion no longer evident in Inferior leads Nonspecific T wave abnormality, improved in Anterolateral leads QT has lengthened Confirmed by Kirstin Zamudio ([id number]) on 1/6/2024 12:00:12 PM    ECG 12 Lead    Result Date: 1/5/2024  Sinus tachycardia with Premature atrial complexes with Aberrant conduction Septal infarct (cited on or before 04-JAN-2024) Abnormal ECG When compared with ECG of 04-JAN-2024 16:04, Aberrant conduction is now Present QT has shortened Confirmed by Kirstin Zamudio ([id number]) on 1/5/2024 5:38:03 PM    ECG 12 Lead    Result Date: 1/4/2024  Normal sinus rhythm Septal infarct , age undetermined ST & T wave abnormality, consider inferior ischemia Abnormal ECG No previous ECGs available See ED  provider note for full interpretation and clinical correlation Confirmed by Medhat Gonzales ([id number]) on 1/4/2024 8:22:16 PM    XR chest 2 views    Result Date: 1/4/2024  Interpreted By:  Dragan Hong, STUDY: XR CHEST 2 VIEWS  1/4/2024 3:15 pm   INDICATION: Signs/Symptoms:cough   COMPARISON: None.   ACCESSION NUMBER(S): [id number]   ORDERING CLINICIAN: YAN CHRISTOPHER   TECHNIQUE: PA and lateral views of the chest were obtained.   FINDINGS: No focal infiltrate, pleural effusion or pneumothorax is identified. The cardiac silhouette is within normal limits for size. Mild-to-moderate discogenic degenerative changes are seen throughout the thoracic spine.       No focal infiltrate or pneumothorax.   MACRO: None.   Signed by: Dragan Hong 1/4/2024 3:19 PM Dictation workstation:   [id number]       Labs reviewed:    Lab Results   Component Value Date    WBC 13.1 (H) 01/04/2024    HGB 15.7 01/04/2024    HCT 45.2 01/04/2024     01/04/2024    CHOL 180 08/28/2023    TRIG 168 (H) 08/28/2023    HDL 32.6 (A) 08/28/2023    ALT 44 08/28/2023    AST 33 08/28/2023     (L) 01/05/2024    K 4.2 01/05/2024     01/05/2024    CREATININE 0.86 01/05/2024    BUN 21 01/05/2024    CO2 24 01/05/2024    INR 1.0 09/02/2022    HGBA1C 6.1 (A) 08/28/2023       Assessment/Plan   Problem List Items Addressed This Visit             ICD-10-CM    ECG abnormality R94.31    Relevant Orders    CBC    Comprehensive metabolic panel    Magnesium    Transthoracic Echo (TTE) Complete    Nuclear Stress Test    Generalized anxiety disorder - Primary F41.1    Generalized arthritis M19.90    Hyperlipidemia E78.5    Relevant Medications    fluticasone (Flonase) 50 mcg/actuation nasal spray    fenofibrate (Triglide) 160 mg tablet    Hypertension I10    Relevant Medications    losartan (Cozaar) 25 mg tablet    hydroCHLOROthiazide (Microzide) 12.5 mg capsule    fluticasone (Flonase) 50 mcg/actuation nasal spray    Pre-diabetes R73.03     Other Visit  Diagnoses         Codes    Chronic obstructive pulmonary disease, unspecified COPD type (CMS/[facility])     J44.9    Relevant Medications    albuterol (ProAir HFA) 90 mcg/actuation inhaler    Dyspnea, unspecified type     R06.00    Relevant Orders    CBC    Comprehensive metabolic panel    Magnesium    Transthoracic Echo (TTE) Complete    Nuclear Stress Test            Reinforced lifestyle modifications  Continue current medications as listed  Physical activity as tolerated and healthy diet encouraged  Maintain a healthy weight  Follow up in 2-3 weeks

## 2024-02-19 DIAGNOSIS — R06.02 SOB (SHORTNESS OF BREATH): ICD-10-CM

## 2024-02-21 RX ORDER — FLUTICASONE FUROATE AND VILANTEROL 100; 25 UG/1; UG/1
1 POWDER RESPIRATORY (INHALATION) DAILY
Qty: 1 EACH | Refills: 0 | OUTPATIENT
Start: 2024-02-21 | End: 2024-03-22

## 2024-02-27 DIAGNOSIS — J44.1 COPD EXACERBATION (MULTI): ICD-10-CM

## 2024-02-27 DIAGNOSIS — R06.02 SOB (SHORTNESS OF BREATH): ICD-10-CM

## 2024-02-27 RX ORDER — FLUTICASONE FUROATE AND VILANTEROL 100; 25 UG/1; UG/1
1 POWDER RESPIRATORY (INHALATION) DAILY
Qty: 28 EACH | Refills: 1 | Status: SHIPPED | OUTPATIENT
Start: 2024-02-27 | End: 2024-04-01 | Stop reason: SDUPTHER

## 2024-02-29 ENCOUNTER — APPOINTMENT (OUTPATIENT)
Dept: PRIMARY CARE | Facility: CLINIC | Age: 59
End: 2024-02-29
Payer: COMMERCIAL

## 2024-03-01 ENCOUNTER — HOSPITAL ENCOUNTER (OUTPATIENT)
Dept: RADIOLOGY | Facility: CLINIC | Age: 59
Discharge: HOME | End: 2024-03-01
Payer: COMMERCIAL

## 2024-03-01 ENCOUNTER — LAB (OUTPATIENT)
Dept: LAB | Facility: LAB | Age: 59
End: 2024-03-01
Payer: COMMERCIAL

## 2024-03-01 ENCOUNTER — CLINICAL SUPPORT (OUTPATIENT)
Dept: CARDIOLOGY | Facility: CLINIC | Age: 59
End: 2024-03-01
Payer: COMMERCIAL

## 2024-03-01 ENCOUNTER — ANCILLARY PROCEDURE (OUTPATIENT)
Dept: CARDIOLOGY | Facility: CLINIC | Age: 59
End: 2024-03-01
Payer: COMMERCIAL

## 2024-03-01 DIAGNOSIS — R94.31 ECG ABNORMALITY: ICD-10-CM

## 2024-03-01 DIAGNOSIS — R06.00 DYSPNEA, UNSPECIFIED TYPE: ICD-10-CM

## 2024-03-01 LAB
ALBUMIN SERPL BCP-MCNC: 4.4 G/DL (ref 3.4–5)
ALP SERPL-CCNC: 51 U/L (ref 33–136)
ALT SERPL W P-5'-P-CCNC: 32 U/L (ref 10–52)
ANION GAP SERPL CALC-SCNC: 11 MMOL/L (ref 10–20)
AORTIC VALVE MEAN GRADIENT: 5 MMHG
AORTIC VALVE PEAK VELOCITY: 1.47 M/S
AST SERPL W P-5'-P-CCNC: 27 U/L (ref 9–39)
AV PEAK GRADIENT: 8.6 MMHG
AVA (PEAK VEL): 2.52 CM2
AVA (VTI): 2.34 CM2
BILIRUB SERPL-MCNC: 0.5 MG/DL (ref 0–1.2)
BUN SERPL-MCNC: 23 MG/DL (ref 6–23)
CALCIUM SERPL-MCNC: 10.2 MG/DL (ref 8.6–10.3)
CHLORIDE SERPL-SCNC: 103 MMOL/L (ref 98–107)
CO2 SERPL-SCNC: 33 MMOL/L (ref 21–32)
CREAT SERPL-MCNC: 0.84 MG/DL (ref 0.5–1.3)
EGFRCR SERPLBLD CKD-EPI 2021: >90 ML/MIN/1.73M*2
EJECTION FRACTION APICAL 4 CHAMBER: 72.1
EJECTION FRACTION: 66 %
ERYTHROCYTE [DISTWIDTH] IN BLOOD BY AUTOMATED COUNT: 14.3 % (ref 11.5–14.5)
GLUCOSE SERPL-MCNC: 120 MG/DL (ref 74–99)
HCT VFR BLD AUTO: 48.8 % (ref 41–52)
HGB BLD-MCNC: 16.1 G/DL (ref 13.5–17.5)
LEFT VENTRICLE INTERNAL DIMENSION DIASTOLE: 4.67 CM (ref 3.5–6)
LEFT VENTRICULAR OUTFLOW TRACT DIAMETER: 2.1 CM
MAGNESIUM SERPL-MCNC: 1.68 MG/DL (ref 1.6–2.4)
MCH RBC QN AUTO: 29.1 PG (ref 26–34)
MCHC RBC AUTO-ENTMCNC: 33 G/DL (ref 32–36)
MCV RBC AUTO: 88 FL (ref 80–100)
MITRAL VALVE E/A RATIO: 0.78
MITRAL VALVE E/E' RATIO: 9
NRBC BLD-RTO: 0 /100 WBCS (ref 0–0)
PLATELET # BLD AUTO: 204 X10*3/UL (ref 150–450)
POTASSIUM SERPL-SCNC: 4.2 MMOL/L (ref 3.5–5.3)
PROT SERPL-MCNC: 6.6 G/DL (ref 6.4–8.2)
RBC # BLD AUTO: 5.54 X10*6/UL (ref 4.5–5.9)
RIGHT VENTRICLE PEAK SYSTOLIC PRESSURE: 38.8 MMHG
SODIUM SERPL-SCNC: 143 MMOL/L (ref 136–145)
WBC # BLD AUTO: 7.3 X10*3/UL (ref 4.4–11.3)

## 2024-03-01 PROCEDURE — 3430000001 HC RX 343 DIAGNOSTIC RADIOPHARMACEUTICALS: Performed by: FAMILY MEDICINE

## 2024-03-01 PROCEDURE — A9502 TC99M TETROFOSMIN: HCPCS | Performed by: FAMILY MEDICINE

## 2024-03-01 PROCEDURE — 83735 ASSAY OF MAGNESIUM: CPT

## 2024-03-01 PROCEDURE — 80053 COMPREHEN METABOLIC PANEL: CPT

## 2024-03-01 PROCEDURE — 85027 COMPLETE CBC AUTOMATED: CPT

## 2024-03-01 PROCEDURE — 36415 COLL VENOUS BLD VENIPUNCTURE: CPT

## 2024-03-01 PROCEDURE — 93017 CV STRESS TEST TRACING ONLY: CPT

## 2024-03-01 PROCEDURE — 93306 TTE W/DOPPLER COMPLETE: CPT

## 2024-03-01 PROCEDURE — 78452 HT MUSCLE IMAGE SPECT MULT: CPT

## 2024-03-01 PROCEDURE — 93306 TTE W/DOPPLER COMPLETE: CPT | Performed by: INTERNAL MEDICINE

## 2024-03-01 RX ADMIN — TETROFOSMIN 10.8 MILLICURIE: 0.23 INJECTION, POWDER, LYOPHILIZED, FOR SOLUTION INTRAVENOUS at 07:36

## 2024-03-01 RX ADMIN — TETROFOSMIN 35.2 MILLICURIE: 0.23 INJECTION, POWDER, LYOPHILIZED, FOR SOLUTION INTRAVENOUS at 08:49

## 2024-03-05 ENCOUNTER — TELEPHONE (OUTPATIENT)
Dept: PRIMARY CARE | Facility: CLINIC | Age: 59
End: 2024-03-05
Payer: COMMERCIAL

## 2024-03-05 DIAGNOSIS — R94.31 ECG ABNORMALITY: ICD-10-CM

## 2024-03-05 NOTE — TELEPHONE ENCOUNTER
Patient advised that he was referred to see a cardiologist but there is no referral in chart.   Referral pended.

## 2024-03-13 ENCOUNTER — OFFICE VISIT (OUTPATIENT)
Dept: CARDIOLOGY | Facility: CLINIC | Age: 59
End: 2024-03-13
Payer: COMMERCIAL

## 2024-03-13 VITALS
HEIGHT: 69 IN | SYSTOLIC BLOOD PRESSURE: 130 MMHG | HEART RATE: 74 BPM | DIASTOLIC BLOOD PRESSURE: 86 MMHG | WEIGHT: 229.1 LBS | BODY MASS INDEX: 33.93 KG/M2

## 2024-03-13 DIAGNOSIS — Z01.818 PRE-OP TESTING: ICD-10-CM

## 2024-03-13 DIAGNOSIS — R94.31 ECG ABNORMALITY: ICD-10-CM

## 2024-03-13 DIAGNOSIS — I20.9 ANGINA, CLASS III (CMS-HCC): ICD-10-CM

## 2024-03-13 DIAGNOSIS — R06.09 DYSPNEA ON EXERTION: ICD-10-CM

## 2024-03-13 DIAGNOSIS — E78.2 MIXED HYPERLIPIDEMIA: ICD-10-CM

## 2024-03-13 DIAGNOSIS — R73.03 PRE-DIABETES: ICD-10-CM

## 2024-03-13 DIAGNOSIS — R94.39 ABNORMAL NUCLEAR STRESS TEST: ICD-10-CM

## 2024-03-13 DIAGNOSIS — Z82.49 FAMILY HISTORY OF ISCHEMIC HEART DISEASE: ICD-10-CM

## 2024-03-13 DIAGNOSIS — I10 PRIMARY HYPERTENSION: ICD-10-CM

## 2024-03-13 PROBLEM — Z87.891 FORMER CIGARETTE SMOKER: Status: RESOLVED | Noted: 2024-03-13 | Resolved: 2024-03-13

## 2024-03-13 PROBLEM — Z87.891 FORMER CIGARETTE SMOKER: Status: ACTIVE | Noted: 2024-03-13

## 2024-03-13 PROCEDURE — 99204 OFFICE O/P NEW MOD 45 MIN: CPT | Performed by: INTERNAL MEDICINE

## 2024-03-13 PROCEDURE — 3075F SYST BP GE 130 - 139MM HG: CPT | Performed by: INTERNAL MEDICINE

## 2024-03-13 PROCEDURE — 3079F DIAST BP 80-89 MM HG: CPT | Performed by: INTERNAL MEDICINE

## 2024-03-13 PROCEDURE — 1036F TOBACCO NON-USER: CPT | Performed by: INTERNAL MEDICINE

## 2024-03-13 PROCEDURE — 3008F BODY MASS INDEX DOCD: CPT | Performed by: INTERNAL MEDICINE

## 2024-03-13 RX ORDER — NAPROXEN SODIUM 220 MG/1
81 TABLET, FILM COATED ORAL DAILY
Qty: 90 TABLET | Refills: 3 | Status: SHIPPED | OUTPATIENT
Start: 2024-03-13 | End: 2025-03-13

## 2024-03-13 RX ORDER — ROSUVASTATIN CALCIUM 10 MG/1
10 TABLET, COATED ORAL DAILY
Qty: 90 TABLET | Refills: 3 | Status: SHIPPED | OUTPATIENT
Start: 2024-03-13

## 2024-03-13 NOTE — PROGRESS NOTES
CARDIOLOGY OFFICE VISIT      CHIEF COMPLAINT    Dyspnea    HISTORY OF PRESENT ILLNESS  The patient is being seen today because of dyspnea and abnormal nuclear stress test.  The patient states that since Thanksgiving of last year he has been bothered by dyspnea.  He states this is mainly with exertion.  He denies chest discomfort.  He denies pretibial edema.  He denies palpitations and syncope.  He had an echocardiogram performed which demonstrates normal left ventricular systolic function and no significant valvular abnormalities.  His pulmonary artery pressure was mildly elevated at 39 mmHg.  He had nuclear stress test performed which was abnormal.  This demonstrates inferobasilar and inferoseptal myocardial ischemia.  I explained what this means to the patient and his wife.  He has had a remote cardiac catheterization a long time ago at [facility] which she states was unremarkable.  He believes after discussing that he would like to have cardiac catheterization.  I told him I agree giving his symptoms and abnormal nuclear stress test.  His LDL cholesterol is elevated.  I will start him on a statin.    EKG: Normal sinus rhythm, poor R wave progression, possible remote anterior infarction, possibly lead placement, nonspecific ST-T changes, results discussed with patient    Impression:  Dyspnea on exertion, anginal equivalent, CCS class III, abnormal nuclear stress test as described above, abnormal EKG as described above, recommend cardiac catheterization with possible PCI, patient and wife agree and wish to proceed  Hypertension  Hyperlipidemia  Prediabetes  Former smoker  Obesity    Please excuse any errors in grammar or translation related to this dictation.  Voice recognition software was utilized to prepare this document.    Past Medical History  Past Medical History:   Diagnosis Date    History of colon polyps 06/06/2019    Other allergic rhinitis 02/18/2020    Perennial allergic rhinitis    Plantar fascial  fibromatosis 2020    Plantar fasciitis of left foot    Ventral hernia without obstruction or gangrene 2021    Ventral hernia without obstruction or gangrene       Social History  Social History     Tobacco Use    Smoking status: Former     Types: Cigarettes     Quit date:      Years since quittin.2    Smokeless tobacco: Never   Substance Use Topics    Alcohol use: Never    Drug use: Never       Family History     Family History   Problem Relation Name Age of Onset    Diabetes type II Mother      Other (cardiac disorder) Father      Hypertension Father          Allergies:  Allergies   Allergen Reactions    Codeine Nausea And Vomiting, Unknown and Other        Outpatient Medications:  Current Outpatient Medications   Medication Instructions    albuterol (ProAir HFA) 90 mcg/actuation inhaler 2 puffs, inhalation, Every 4 hours PRN    fenofibrate (TRIGLIDE) 160 mg, oral, Every Day    fluticasone (Flonase) 50 mcg/actuation nasal spray 1 spray, Each Nostril, Daily, Shake gently. Before first use, prime pump. After use, clean tip and replace cap.    fluticasone furoate-vilanteroL (Breo Ellipta) 100-25 mcg/dose inhaler 1 puff, inhalation, Daily    hydroCHLOROthiazide (MICROZIDE) 12.5 mg, oral, Every Day    losartan (COZAAR) 25 mg, oral, Every Day          REVIEW OF SYSTEMS  Review of Systems   All other systems reviewed and are negative.        VITALS  Vitals:    24 1534   BP: 130/86   Pulse: 74       PHYSICAL EXAM  Constitutional:       Appearance: Healthy appearance. Not in distress.   Eyes:      Conjunctiva/sclera: Conjunctivae normal.      Pupils: Pupils are equal, round, and reactive to light.   Neck:      Vascular: No JVR. JVD normal.   Pulmonary:      Effort: Pulmonary effort is normal.      Breath sounds: Normal breath sounds. No wheezing. No rhonchi. No rales.   Chest:      Chest wall: Not tender to palpatation.   Cardiovascular:      PMI at left midclavicular line. Normal rate. Regular  rhythm. Normal S1. Normal S2.       Murmurs: There is no murmur.      No gallop.  No click. No rub.   Pulses:     Intact distal pulses.   Edema:     Peripheral edema present.     Pretibial: bilateral trace edema of the pretibial area.  Abdominal:      Tenderness: There is no abdominal tenderness.   Musculoskeletal: Normal range of motion.         General: No tenderness.      Cervical back: Normal range of motion. Skin:     General: Skin is warm and dry.   Neurological:      General: No focal deficit present.      Mental Status: Alert and oriented to person, place and time.           ASSESSMENT AND PLAN  Diagnoses and all orders for this visit:  ECG abnormality  -     Referral to Cardiology  Primary hypertension  Dyspnea on exertion  Angina, class III (CMS/HCC)  Abnormal nuclear stress test  Mixed hyperlipidemia  Family history of ischemic heart disease  Pre-diabetes  BMI 34.0-34.9,adult  Pre-op testing      [unfilled]

## 2024-03-13 NOTE — H&P (VIEW-ONLY)
CARDIOLOGY OFFICE VISIT      CHIEF COMPLAINT    Dyspnea    HISTORY OF PRESENT ILLNESS  The patient is being seen today because of dyspnea and abnormal nuclear stress test.  The patient states that since Thanksgiving of last year he has been bothered by dyspnea.  He states this is mainly with exertion.  He denies chest discomfort.  He denies pretibial edema.  He denies palpitations and syncope.  He had an echocardiogram performed which demonstrates normal left ventricular systolic function and no significant valvular abnormalities.  His pulmonary artery pressure was mildly elevated at 39 mmHg.  He had nuclear stress test performed which was abnormal.  This demonstrates inferobasilar and inferoseptal myocardial ischemia.  I explained what this means to the patient and his wife.  He has had a remote cardiac catheterization a long time ago at [facility] which she states was unremarkable.  He believes after discussing that he would like to have cardiac catheterization.  I told him I agree giving his symptoms and abnormal nuclear stress test.  His LDL cholesterol is elevated.  I will start him on a statin.    EKG: Normal sinus rhythm, poor R wave progression, possible remote anterior infarction, possibly lead placement, nonspecific ST-T changes, results discussed with patient    Impression:  Dyspnea on exertion, anginal equivalent, CCS class III, abnormal nuclear stress test as described above, abnormal EKG as described above, recommend cardiac catheterization with possible PCI, patient and wife agree and wish to proceed  Hypertension  Hyperlipidemia  Prediabetes  Former smoker  Obesity    Please excuse any errors in grammar or translation related to this dictation.  Voice recognition software was utilized to prepare this document.    Past Medical History  Past Medical History:   Diagnosis Date    History of colon polyps 06/06/2019    Other allergic rhinitis 02/18/2020    Perennial allergic rhinitis    Plantar fascial  fibromatosis 2020    Plantar fasciitis of left foot    Ventral hernia without obstruction or gangrene 2021    Ventral hernia without obstruction or gangrene       Social History  Social History     Tobacco Use    Smoking status: Former     Types: Cigarettes     Quit date:      Years since quittin.2    Smokeless tobacco: Never   Substance Use Topics    Alcohol use: Never    Drug use: Never       Family History     Family History   Problem Relation Name Age of Onset    Diabetes type II Mother      Other (cardiac disorder) Father      Hypertension Father          Allergies:  Allergies   Allergen Reactions    Codeine Nausea And Vomiting, Unknown and Other        Outpatient Medications:  Current Outpatient Medications   Medication Instructions    albuterol (ProAir HFA) 90 mcg/actuation inhaler 2 puffs, inhalation, Every 4 hours PRN    fenofibrate (TRIGLIDE) 160 mg, oral, Every Day    fluticasone (Flonase) 50 mcg/actuation nasal spray 1 spray, Each Nostril, Daily, Shake gently. Before first use, prime pump. After use, clean tip and replace cap.    fluticasone furoate-vilanteroL (Breo Ellipta) 100-25 mcg/dose inhaler 1 puff, inhalation, Daily    hydroCHLOROthiazide (MICROZIDE) 12.5 mg, oral, Every Day    losartan (COZAAR) 25 mg, oral, Every Day          REVIEW OF SYSTEMS  Review of Systems   All other systems reviewed and are negative.        VITALS  Vitals:    24 1534   BP: 130/86   Pulse: 74       PHYSICAL EXAM  Constitutional:       Appearance: Healthy appearance. Not in distress.   Eyes:      Conjunctiva/sclera: Conjunctivae normal.      Pupils: Pupils are equal, round, and reactive to light.   Neck:      Vascular: No JVR. JVD normal.   Pulmonary:      Effort: Pulmonary effort is normal.      Breath sounds: Normal breath sounds. No wheezing. No rhonchi. No rales.   Chest:      Chest wall: Not tender to palpatation.   Cardiovascular:      PMI at left midclavicular line. Normal rate. Regular  rhythm. Normal S1. Normal S2.       Murmurs: There is no murmur.      No gallop.  No click. No rub.   Pulses:     Intact distal pulses.   Edema:     Peripheral edema present.     Pretibial: bilateral trace edema of the pretibial area.  Abdominal:      Tenderness: There is no abdominal tenderness.   Musculoskeletal: Normal range of motion.         General: No tenderness.      Cervical back: Normal range of motion. Skin:     General: Skin is warm and dry.   Neurological:      General: No focal deficit present.      Mental Status: Alert and oriented to person, place and time.           ASSESSMENT AND PLAN  Diagnoses and all orders for this visit:  ECG abnormality  -     Referral to Cardiology  Primary hypertension  Dyspnea on exertion  Angina, class III (CMS/HCC)  Abnormal nuclear stress test  Mixed hyperlipidemia  Family history of ischemic heart disease  Pre-diabetes  BMI 34.0-34.9,adult  Pre-op testing      [unfilled]

## 2024-03-15 ENCOUNTER — LAB (OUTPATIENT)
Dept: LAB | Facility: LAB | Age: 59
End: 2024-03-15
Payer: COMMERCIAL

## 2024-03-15 DIAGNOSIS — R94.39 ABNORMAL NUCLEAR STRESS TEST: ICD-10-CM

## 2024-03-15 DIAGNOSIS — R06.09 DYSPNEA ON EXERTION: ICD-10-CM

## 2024-03-15 DIAGNOSIS — Z01.818 PRE-OP TESTING: ICD-10-CM

## 2024-03-15 DIAGNOSIS — I20.9 ANGINA, CLASS III (CMS-HCC): ICD-10-CM

## 2024-03-15 LAB
ANION GAP SERPL CALC-SCNC: 12 MMOL/L (ref 10–20)
BUN SERPL-MCNC: 22 MG/DL (ref 6–23)
CALCIUM SERPL-MCNC: 9.8 MG/DL (ref 8.6–10.3)
CHLORIDE SERPL-SCNC: 105 MMOL/L (ref 98–107)
CO2 SERPL-SCNC: 27 MMOL/L (ref 21–32)
CREAT SERPL-MCNC: 0.9 MG/DL (ref 0.5–1.3)
EGFRCR SERPLBLD CKD-EPI 2021: >90 ML/MIN/1.73M*2
ERYTHROCYTE [DISTWIDTH] IN BLOOD BY AUTOMATED COUNT: 14.3 % (ref 11.5–14.5)
GLUCOSE SERPL-MCNC: 120 MG/DL (ref 74–99)
HCT VFR BLD AUTO: 45 % (ref 41–52)
HGB BLD-MCNC: 15.1 G/DL (ref 13.5–17.5)
MCH RBC QN AUTO: 29.7 PG (ref 26–34)
MCHC RBC AUTO-ENTMCNC: 33.6 G/DL (ref 32–36)
MCV RBC AUTO: 89 FL (ref 80–100)
NRBC BLD-RTO: 0 /100 WBCS (ref 0–0)
PLATELET # BLD AUTO: 224 X10*3/UL (ref 150–450)
POTASSIUM SERPL-SCNC: 3.7 MMOL/L (ref 3.5–5.3)
RBC # BLD AUTO: 5.08 X10*6/UL (ref 4.5–5.9)
SODIUM SERPL-SCNC: 140 MMOL/L (ref 136–145)
WBC # BLD AUTO: 6.8 X10*3/UL (ref 4.4–11.3)

## 2024-03-15 PROCEDURE — 80048 BASIC METABOLIC PNL TOTAL CA: CPT

## 2024-03-15 PROCEDURE — 36415 COLL VENOUS BLD VENIPUNCTURE: CPT

## 2024-03-15 PROCEDURE — 85027 COMPLETE CBC AUTOMATED: CPT

## 2024-03-18 ENCOUNTER — OFFICE VISIT (OUTPATIENT)
Dept: PRIMARY CARE | Facility: CLINIC | Age: 59
End: 2024-03-18
Payer: COMMERCIAL

## 2024-03-18 VITALS
WEIGHT: 229 LBS | HEIGHT: 69 IN | TEMPERATURE: 97.3 F | HEART RATE: 88 BPM | SYSTOLIC BLOOD PRESSURE: 128 MMHG | DIASTOLIC BLOOD PRESSURE: 84 MMHG | BODY MASS INDEX: 33.92 KG/M2

## 2024-03-18 DIAGNOSIS — R06.09 DYSPNEA ON EXERTION: ICD-10-CM

## 2024-03-18 DIAGNOSIS — M19.90 GENERALIZED ARTHRITIS: ICD-10-CM

## 2024-03-18 DIAGNOSIS — Z82.49 FAMILY HISTORY OF ISCHEMIC HEART DISEASE: ICD-10-CM

## 2024-03-18 DIAGNOSIS — F41.1 GENERALIZED ANXIETY DISORDER: Primary | ICD-10-CM

## 2024-03-18 DIAGNOSIS — E78.2 MIXED HYPERLIPIDEMIA: ICD-10-CM

## 2024-03-18 DIAGNOSIS — K43.9 VENTRAL HERNIA WITHOUT OBSTRUCTION OR GANGRENE: ICD-10-CM

## 2024-03-18 DIAGNOSIS — R94.39 ABNORMAL NUCLEAR STRESS TEST: ICD-10-CM

## 2024-03-18 DIAGNOSIS — R73.03 PRE-DIABETES: ICD-10-CM

## 2024-03-18 PROCEDURE — 3074F SYST BP LT 130 MM HG: CPT | Performed by: FAMILY MEDICINE

## 2024-03-18 PROCEDURE — 99214 OFFICE O/P EST MOD 30 MIN: CPT | Performed by: FAMILY MEDICINE

## 2024-03-18 PROCEDURE — 3008F BODY MASS INDEX DOCD: CPT | Performed by: FAMILY MEDICINE

## 2024-03-18 PROCEDURE — 1036F TOBACCO NON-USER: CPT | Performed by: FAMILY MEDICINE

## 2024-03-18 PROCEDURE — 3079F DIAST BP 80-89 MM HG: CPT | Performed by: FAMILY MEDICINE

## 2024-03-18 RX ORDER — CELECOXIB 200 MG/1
200 CAPSULE ORAL DAILY PRN
Qty: 30 CAPSULE | Refills: 5 | Status: SHIPPED | OUTPATIENT
Start: 2024-03-18 | End: 2024-09-14

## 2024-03-18 NOTE — PROGRESS NOTES
"Subjective   Chief complaint: Charlie Massey is a 58 y.o. male who presents for Follow-up and Results (Patient here for follow-up on test results. Patient stated that he started cholesterol medication from Cardiologist, but is having stomach ache, so is going to stop medication. ).    HPI:  HPI  Chronic disease management.  Went to see stress test.  Abnormal.  Went to see cardiology.  Being scheduled for catheterization.  Was started on a statin.  Does not he thinks he is not tolerating it.  He is having nausea headache stomachache.  He stopped the statin medicine.  His other active problems noted.  History of generalized osteoarthritis.  Getting worse.  Is exacerbating.  Has been on Celebrex in the past.  Other labs were reviewed.  Her now we will do it prescription for Celebrex as needed.  He also complained of ventral hernia.  Right side.  Is getting worse.  Has had surgical intervention.  Now its recurred.  Will proceed with general surgery consultation.  Follow-up with his cardiologist and catheterization as scheduled continue risk factor modification and medical management in the interim.  Otherwise follow-up as scheduled  Objective   /84 (BP Location: Left arm, Patient Position: Sitting, BP Cuff Size: Adult)   Pulse 88   Temp 36.3 °C (97.3 °F) (Temporal)   Ht 1.74 m (5' 8.5\")   Wt 104 kg (229 lb)   BMI 34.31 kg/m²   Physical Exam  Vitals and nursing note reviewed.   Constitutional:       Appearance: Normal appearance.   HENT:      Head: Normocephalic and atraumatic.   Eyes:      Extraocular Movements: Extraocular movements intact.      Conjunctiva/sclera: Conjunctivae normal.      Pupils: Pupils are equal, round, and reactive to light.   Cardiovascular:      Rate and Rhythm: Normal rate and regular rhythm.      Heart sounds: Normal heart sounds.   Pulmonary:      Effort: Pulmonary effort is normal.      Breath sounds: Normal breath sounds.   Abdominal:      General: Abdomen is flat. Bowel sounds " are normal.      Palpations: Abdomen is soft.   Musculoskeletal:         General: Normal range of motion.   Skin:     General: Skin is warm and dry.   Neurological:      General: No focal deficit present.      Mental Status: He is alert and oriented to person, place, and time. Mental status is at baseline.   Psychiatric:         Mood and Affect: Mood normal.         Behavior: Behavior normal.       Review of Systems   I have reviewed and reconciled the medication list with the patient today.   Current Outpatient Medications:     albuterol (ProAir HFA) 90 mcg/actuation inhaler, Inhale 2 puffs every 4 hours if needed for wheezing or shortness of breath., Disp: 8.5 g, Rfl: 0    aspirin 81 mg chewable tablet, Chew 1 tablet (81 mg) once daily., Disp: 90 tablet, Rfl: 3    fenofibrate (Triglide) 160 mg tablet, Take 1 tablet (160 mg) by mouth once every day., Disp: 90 tablet, Rfl: 0    fluticasone (Flonase) 50 mcg/actuation nasal spray, Administer 1 spray into each nostril once daily. Shake gently. Before first use, prime pump. After use, clean tip and replace cap., Disp: 16 g, Rfl: 1    fluticasone furoate-vilanteroL (Breo Ellipta) 100-25 mcg/dose inhaler, Inhale 1 puff once daily., Disp: 28 each, Rfl: 1    hydroCHLOROthiazide (Microzide) 12.5 mg capsule, Take 1 capsule (12.5 mg) by mouth once every day., Disp: 90 capsule, Rfl: 0    losartan (Cozaar) 25 mg tablet, Take 1 tablet (25 mg) by mouth once every day., Disp: 90 tablet, Rfl: 0    magnesium glycinate 100 mg tablet, Take 100 mg by mouth once daily., Disp: 90 tablet, Rfl: 3    rosuvastatin (Crestor) 10 mg tablet, Take 1 tablet (10 mg) by mouth once daily., Disp: 90 tablet, Rfl: 3    celecoxib (CeleBREX) 200 mg capsule, Take 1 capsule (200 mg) by mouth once daily as needed for mild pain (1 - 3)., Disp: 30 capsule, Rfl: 5     Imaging:  Nuclear Stress Test    Result Date: 3/1/2024  Interpreted By:  Gabriel Yeager and Giannuzzi Michael STUDY: MYOCARDIAL PERFUSION  STRESS TEST WITH EXERCISE   Performing facility: [facility],   [address]   [place] [address] Ordering provider: JANNETH Hirsch PCP:  Dr. JANNETH Hirsch Supervising provider:  Surendra Kline MD, [facility]   INDICATION: Abnormal EKG;   HISTORY: Gender:  M; Age:  57 y/o ; Height:  .7 cm cm; Weight:   .874 kg kg.   High Cholesterol;  Abnormal EKG;  Family HX CAD;  HTN;  COPD; Quit smoking 30 years ago.   Cardiac catheterization on 2014?.   COMPARISON: No comparison.   ACCESSION NUMBER(S): [id number]   ORDERING CLINICIAN: CLAUDIO HIRSCH   TECHNIQUE: ONE DAY protocol. Stress injection: Date:3-1-24, 35.2 mCi of Myoview IV at peak exercise. Rest injection: Date: 3-1-24, 10.8 mCi of Myoview IV at rest. Imaging was performed by  gated tomographic technique.   STRESS TEST DATA: Resting heart rate was 69 BPM. Resting blood pressure was 140/82 mmHg. The patient exercised using a  [place] exercise protocol. 6:29 minutes exercised. 85 % of MPHR achieved for age. 7.70 METS achieved. Maximum heart rate was 137 BPM. Maximum blood pressure was 166/76 mmHg. DTS 6. TEST TERMINATED DUE TO:  Fatigue   FINDINGS: STRESS TEST RESULTS:   Resting electrocardiogram revealed normal sinus rhythm, poor R-wave progression, minor non-specific STT wave changes. The patient developed 0.5 mm of upsloping ST depression in the inferolateral leads at peak exercise. These changes did not reach statistical significance. Non-specific STT wave changes were noted in recovery. The patient did not have chest pains/symptoms during the procedure. There were rare PVCs in recovery.   IMAGING RESULTS:   Image quality was good. Rest and stress tomographic images were reviewed and revealed a mild reversible perfusion abnormality involving a small portion of the basal inferoseptal segment. Remaining wall segments show normal perfusion without evidence of myocardial infarction. No left ventricular dilatation with  stress. Overall left ventricular systolic function appeared to be normal without regional wall motion abnormalities. LV ejection fraction was 50 %. TID is 0.83 and is normal. There was not evidence of breast/motion/diaphragmatic attenuation artifact.       Mildly abnormall exercise Myoview cardiac perfusion stress test. Mild basal inferoseptal myocardial ischemia. No evidence myocardial infarction by perfusion imaging. Normal left ventricular systolic function, ejection fraction 50%. No exercise provoked significant ischemic ECG changes or chest pain symptoms. Noninvasive risk stratification: Low to intermediate risk. No previous available for comparison.   Signed by: Gabriel Yeager 3/1/2024 5:42 PM Dictation workstation:   [id number]    Transthoracic Echo (TTE) Complete    Result Date: 3/1/2024   [facility] [address], [place] [address]            Tel [phone] Fax [phone] TRANSTHORACIC ECHOCARDIOGRAM REPORT  Patient Name:      KARLA DEVRIES MARY KATE      Reading Physician:    [id number] Khoa Armstrong MD, [facility] Study Date:        3/1/2024              Ordering Provider:    [id number] CLAUDIO GILLETTE MRN/PID:           [id number]              Fellow: Accession#:        [id number]          Nurse: Date of Birth/Age: 1965 / 58 years   Sonographer:          Anneliese Rodriguez                                                                RDCS, RT(R),                                                                RDMS, RVT Gender:            M                     Additional Staff: Height:            172.72 cm             Admit Date: Weight:            103.87 kg             Admission Status: BSA / BMI:         2.16 m2 / 34.82 kg/m2 Department Location:  [facility]                                                                 [place] Study Type:    TRANSTHORACIC ECHO (TTE) COMPLETE Diagnosis/ICD: Abnormal electrocardiogram [ECG] [EKG]-R94.31; Dyspnea,                unspecified-R06.00 Patient History: Pertinent History: COPD, HTN and Hyperlipidemia. Study Detail: The following Echo studies were performed: 2D, M-Mode, Doppler and               color flow.  PHYSICIAN INTERPRETATION: Left Ventricle: Left ventricular systolic function is normal, with an estimated ejection fraction of 65-70%. There are no regional wall motion abnormalities. The left ventricular cavity size is normal. Spectral Doppler shows an abnormal pattern of left ventricular diastolic filling. Left Atrium: The left atrium is normal in size. Right Ventricle: The right ventricle is normal in size. There is normal right ventricular global systolic function. Normal RV size and function Mild pulmonary hypertension RVSP 39 mmHg. Right Atrium: The right atrium is normal in size. Aortic Valve: The aortic valve is trileaflet. There is no evidence of aortic valve regurgitation. The peak instantaneous gradient of the aortic valve is 8.6 mmHg. The mean gradient of the aortic valve is 5.0 mmHg. Aortic valve is tricuspid. Minimal sclerosis no evidence hemodynamically significant stenosis or regurgitation. Mitral Valve: The mitral valve is normal in structure. There is no evidence of mitral valve regurgitation. Normal mitral valve. Tricuspid Valve: The tricuspid valve is structurally normal. There is mild tricuspid regurgitation. Pulmonic Valve: The pulmonic valve is structurally normal. There is no indication of pulmonic valve regurgitation. Pericardium: There is no pericardial effusion noted. There is a pericardial fat pad present. Aorta: The aortic root is normal.  CONCLUSIONS:  1. Left ventricular systolic function is normal with a 65-70% estimated ejection fraction.  2. Spectral Doppler shows an abnormal pattern of left ventricular diastolic filling.  3. Normal RV size and function      Mild pulmonary hypertension RVSP 39 mmHg.  4. Normal mitral valve.  5. Aortic valve is tricuspid. Minimal sclerosis no evidence hemodynamically significant stenosis or regurgitation. QUANTITATIVE DATA SUMMARY: 2D MEASUREMENTS:                          Normal Ranges: Ao Root d:     3.20 cm   (2.0-3.7cm) LAs:           3.00 cm   (2.7-4.0cm) RVIDd:         3.41 cm   (0.9-3.6cm) IVSd:          0.77 cm   (0.6-1.1cm) LVPWd:         0.89 cm   (0.6-1.1cm) LVIDd:         4.67 cm   (3.9-5.9cm) LVIDs:         2.39 cm LV Mass Index: 58.7 g/m2 LV % FS        48.8 % LA VOLUME:                             Normal Ranges: LA Volume Index: 18.0 ml/m2 AORTA MEASUREMENTS:                    Normal Ranges: Asc Ao, d: 2.90 cm (2.1-3.4cm) LV SYSTOLIC FUNCTION BY 2D PLANIMETRY (MOD):                     Normal Ranges: EF-A4C View: 72.1 % (>=55%) EF-A2C View: 59.2 % EF-Biplane:  66.0 % LV DIASTOLIC FUNCTION:                              Normal Ranges: MV Peak E:        0.87 m/s   (0.7-1.2 m/s) MV Peak A:        1.12 m/s   (0.42-0.7 m/s) E/A Ratio:        0.78       (1.0-2.2) MV lateral e'     0.10 m/s MV medial e'      0.08 m/s E/e' Ratio:       9.00       (<8.0) PulmV Sys Kirt:    57.60 cm/s PulmV Zhang Kirt:   42.40 cm/s PulmV S/D Kirt:    1.40 PulmV A Revs Kirt: 30.70 cm/s PulmV A Revs Dur: 92.00 msec MITRAL VALVE:                      Normal Ranges: MV Vmax:    1.05 m/s (<=1.3m/s) MV peak P.4 mmHg (<5mmHg) MV mean P.0 mmHg (<48mmHg) MV DT:      180 msec (150-240msec) AORTIC VALVE:                                   Normal Ranges: AoV Vmax:                1.47 m/s (<=1.7m/s) AoV Peak P.6 mmHg (<20mmHg) AoV Mean P.0 mmHg (1.7-11.5mmHg) LVOT Max Kirt:            1.07 m/s (<=1.1m/s) AoV VTI:                 33.60 cm (18-25cm) LVOT VTI:                22.70 cm LVOT Diameter:           2.10 cm  (1.8-2.4cm) AoV Area, VTI:           2.34 cm2 (2.5-5.5cm2) AoV Area,Vmax:           2.52 cm2 (2.5-4.5cm2) AoV  Dimensionless Index: 0.68 TRICUSPID VALVE/RVSP:                             Normal Ranges: Peak TR Velocity: 2.99 m/s Est. RA Pressure: 3 mmHg RV Syst Pressure: 38.8 mmHg (< 30mmHg) PULMONIC VALVE:                      Normal Ranges: PV Max Kirt: 1.1 m/s  (0.6-0.9m/s) PV Max P.5 mmHg Pulmonary Veins: PulmV A Revs Dur: 92.00 msec PulmV A Revs Kirt: 30.70 cm/s PulmV Zhang Kirt:   42.40 cm/s PulmV S/D Kirt:    1.40 PulmV Sys Kirt:    57.60 cm/s  [id number] Khoa Armstrong MD, [facility] Electronically signed on 3/1/2024 at 4:23:03 PM  ** Final **        Labs reviewed:    Lab Results   Component Value Date    WBC 6.8 03/15/2024    HGB 15.1 03/15/2024    HCT 45.0 03/15/2024     03/15/2024    CHOL 180 2023    TRIG 168 (H) 2023    HDL 32.6 (A) 2023    ALT 32 2024    AST 27 2024     03/15/2024    K 3.7 03/15/2024     03/15/2024    CREATININE 0.90 03/15/2024    BUN 22 03/15/2024    CO2 27 03/15/2024    INR 1.0 2022    HGBA1C 6.1 (A) 2023       Assessment/Plan   Problem List Items Addressed This Visit             ICD-10-CM    Generalized anxiety disorder - Primary F41.1    Generalized arthritis M19.90    Relevant Medications    celecoxib (CeleBREX) 200 mg capsule    Hyperlipidemia E78.5    Pre-diabetes R73.03    Dyspnea on exertion R06.09    Abnormal nuclear stress test R94.39    Family history of ischemic heart disease Z82.49     Other Visit Diagnoses         Codes    Ventral hernia without obstruction or gangrene     K43.9    Relevant Orders    Referral to General Surgery            Reinforced lifestyle modifications  Continue current medications as listed  Physical activity as tolerated and healthy diet encouraged  Maintain a healthy weight  Follow up in 4-6 weks

## 2024-03-20 RX ORDER — ASPIRIN 325 MG
325 TABLET ORAL ONCE
Status: CANCELLED | OUTPATIENT
Start: 2024-03-20 | End: 2024-03-20

## 2024-03-22 ENCOUNTER — HOSPITAL ENCOUNTER (OUTPATIENT)
Facility: HOSPITAL | Age: 59
Setting detail: OUTPATIENT SURGERY
Discharge: HOME | End: 2024-03-22
Attending: INTERNAL MEDICINE | Admitting: INTERNAL MEDICINE
Payer: COMMERCIAL

## 2024-03-22 ENCOUNTER — APPOINTMENT (OUTPATIENT)
Dept: CARDIOLOGY | Facility: HOSPITAL | Age: 59
End: 2024-03-22
Payer: COMMERCIAL

## 2024-03-22 DIAGNOSIS — I20.9 ANGINA, CLASS III (CMS-HCC): ICD-10-CM

## 2024-03-22 DIAGNOSIS — R06.09 DYSPNEA ON EXERTION: Primary | ICD-10-CM

## 2024-03-22 DIAGNOSIS — R94.39 ABNORMAL NUCLEAR STRESS TEST: ICD-10-CM

## 2024-03-22 PROCEDURE — G0269 OCCLUSIVE DEVICE IN VEIN ART: HCPCS | Mod: TC | Performed by: INTERNAL MEDICINE

## 2024-03-22 PROCEDURE — 93010 ELECTROCARDIOGRAM REPORT: CPT | Performed by: INTERNAL MEDICINE

## 2024-03-22 PROCEDURE — 93458 L HRT ARTERY/VENTRICLE ANGIO: CPT | Performed by: INTERNAL MEDICINE

## 2024-03-22 PROCEDURE — 99152 MOD SED SAME PHYS/QHP 5/>YRS: CPT | Performed by: INTERNAL MEDICINE

## 2024-03-22 PROCEDURE — 7100000009 HC PHASE TWO TIME - INITIAL BASE CHARGE: Performed by: INTERNAL MEDICINE

## 2024-03-22 PROCEDURE — 93458 L HRT ARTERY/VENTRICLE ANGIO: CPT | Mod: 59 | Performed by: INTERNAL MEDICINE

## 2024-03-22 PROCEDURE — 2500000001 HC RX 250 WO HCPCS SELF ADMINISTERED DRUGS (ALT 637 FOR MEDICARE OP): Performed by: NURSE PRACTITIONER

## 2024-03-22 PROCEDURE — 2550000001 HC RX 255 CONTRASTS: Performed by: INTERNAL MEDICINE

## 2024-03-22 PROCEDURE — 2500000004 HC RX 250 GENERAL PHARMACY W/ HCPCS (ALT 636 FOR OP/ED): Performed by: INTERNAL MEDICINE

## 2024-03-22 PROCEDURE — 2500000005 HC RX 250 GENERAL PHARMACY W/O HCPCS: Performed by: INTERNAL MEDICINE

## 2024-03-22 PROCEDURE — C1760 CLOSURE DEV, VASC: HCPCS | Performed by: INTERNAL MEDICINE

## 2024-03-22 PROCEDURE — 93005 ELECTROCARDIOGRAM TRACING: CPT | Mod: 59

## 2024-03-22 PROCEDURE — 2500000004 HC RX 250 GENERAL PHARMACY W/ HCPCS (ALT 636 FOR OP/ED): Performed by: NURSE PRACTITIONER

## 2024-03-22 PROCEDURE — 2720000007 HC OR 272 NO HCPCS: Performed by: INTERNAL MEDICINE

## 2024-03-22 PROCEDURE — 7100000010 HC PHASE TWO TIME - EACH INCREMENTAL 1 MINUTE: Performed by: INTERNAL MEDICINE

## 2024-03-22 PROCEDURE — 2780000003 HC OR 278 NO HCPCS: Performed by: INTERNAL MEDICINE

## 2024-03-22 RX ORDER — MIDAZOLAM HYDROCHLORIDE 1 MG/ML
INJECTION INTRAMUSCULAR; INTRAVENOUS AS NEEDED
Status: DISCONTINUED | OUTPATIENT
Start: 2024-03-22 | End: 2024-03-22 | Stop reason: HOSPADM

## 2024-03-22 RX ORDER — SODIUM CHLORIDE 9 MG/ML
100 INJECTION, SOLUTION INTRAVENOUS CONTINUOUS
Status: DISCONTINUED | OUTPATIENT
Start: 2024-03-22 | End: 2024-03-22

## 2024-03-22 RX ORDER — RANOLAZINE 500 MG/1
500 TABLET, EXTENDED RELEASE ORAL ONCE
Status: COMPLETED | OUTPATIENT
Start: 2024-03-22 | End: 2024-03-22

## 2024-03-22 RX ORDER — FENTANYL CITRATE 50 UG/ML
INJECTION, SOLUTION INTRAMUSCULAR; INTRAVENOUS AS NEEDED
Status: DISCONTINUED | OUTPATIENT
Start: 2024-03-22 | End: 2024-03-22 | Stop reason: HOSPADM

## 2024-03-22 RX ORDER — METOPROLOL TARTRATE 25 MG/1
12.5 TABLET, FILM COATED ORAL ONCE
Status: COMPLETED | OUTPATIENT
Start: 2024-03-22 | End: 2024-03-22

## 2024-03-22 RX ORDER — LIDOCAINE HYDROCHLORIDE 20 MG/ML
INJECTION, SOLUTION INFILTRATION; PERINEURAL AS NEEDED
Status: DISCONTINUED | OUTPATIENT
Start: 2024-03-22 | End: 2024-03-22 | Stop reason: HOSPADM

## 2024-03-22 RX ORDER — MIDAZOLAM HYDROCHLORIDE 1 MG/ML
INJECTION, SOLUTION INTRAMUSCULAR; INTRAVENOUS AS NEEDED
Status: DISCONTINUED | OUTPATIENT
Start: 2024-03-22 | End: 2024-03-22 | Stop reason: HOSPADM

## 2024-03-22 RX ORDER — SODIUM CHLORIDE 9 MG/ML
100 INJECTION, SOLUTION INTRAVENOUS CONTINUOUS
Status: DISCONTINUED | OUTPATIENT
Start: 2024-03-22 | End: 2024-03-22 | Stop reason: HOSPADM

## 2024-03-22 RX ADMIN — METOPROLOL TARTRATE 12.5 MG: 25 TABLET, FILM COATED ORAL at 06:43

## 2024-03-22 RX ADMIN — RANOLAZINE 500 MG: 500 TABLET, FILM COATED, EXTENDED RELEASE ORAL at 06:43

## 2024-03-22 RX ADMIN — SODIUM CHLORIDE 100 ML/HR: 9 INJECTION, SOLUTION INTRAVENOUS at 06:44

## 2024-03-22 ASSESSMENT — COLUMBIA-SUICIDE SEVERITY RATING SCALE - C-SSRS
2. HAVE YOU ACTUALLY HAD ANY THOUGHTS OF KILLING YOURSELF?: NO
1. IN THE PAST MONTH, HAVE YOU WISHED YOU WERE DEAD OR WISHED YOU COULD GO TO SLEEP AND NOT WAKE UP?: NO

## 2024-03-22 ASSESSMENT — PAIN - FUNCTIONAL ASSESSMENT: PAIN_FUNCTIONAL_ASSESSMENT: 0-10

## 2024-03-22 ASSESSMENT — PAIN SCALES - GENERAL: PAINLEVEL_OUTOF10: 0 - NO PAIN

## 2024-03-22 NOTE — POST-PROCEDURE NOTE
Physician Transition of Care Summary  Invasive Cardiovascular Lab    Procedure Date: 3/22/2024  Attending:    * Cristhian Clements - Primary  Resident/Fellow/Other Assistant: Surgeon(s) and Role:    Pre Procedure Diagnosis:   New onset angina pectoris, abnormal nuclear stress test    Post Procedure Diagnosis:   Angiographically normal coronary arteries, left ventricular ejection fraction 50%,    Complications:   None    Stents/Implants:   None    Anticoagulation/Antiplatelet Plan:   None    Estimated Blood Loss:   0 mL    Electronically signed by: Cristhian Clements MD, 3/22/2024 8:29 AM    Anesthesia: Moderate                            anesthesia Staff: None

## 2024-03-22 NOTE — Clinical Note
Closure device placed in the right femoral artery. Site closed by VASCADE. Vascade and Quickclot, deployed by Traci Malik

## 2024-03-22 NOTE — NURSING NOTE
Patient ambulated at 1030 without difficulty. Right groin site remains stable. Patient states understanding of discharge instructions as given.

## 2024-03-22 NOTE — PROGRESS NOTES
Patient is stable status post LHC under the care of Dr. Clements.  Discussed results of procedure with patient and his family member.  Pictures provided.  Findings of the LHC revealed normal coronary arteries and a normal LVEF.  Medical management is advised.  Patient will be scheduled to follow-up with Dr. Clements in 6 months or sooner as needed.  Patient encouraged to follow-up with his PCP if he continues to have symptoms of shortness of breath.  All questions answered.  Both verbalized understanding.

## 2024-03-24 LAB
ATRIAL RATE: 64 BPM
P AXIS: -16 DEGREES
P OFFSET: 178 MS
P ONSET: 125 MS
PR INTERVAL: 196 MS
Q ONSET: 223 MS
QRS COUNT: 11 BEATS
QRS DURATION: 98 MS
QT INTERVAL: 400 MS
QTC CALCULATION(BAZETT): 412 MS
QTC FREDERICIA: 408 MS
R AXIS: -5 DEGREES
T AXIS: -10 DEGREES
T OFFSET: 423 MS
VENTRICULAR RATE: 64 BPM

## 2024-03-25 VITALS
OXYGEN SATURATION: 98 % | BODY MASS INDEX: 34.11 KG/M2 | RESPIRATION RATE: 16 BRPM | WEIGHT: 225.09 LBS | SYSTOLIC BLOOD PRESSURE: 146 MMHG | DIASTOLIC BLOOD PRESSURE: 85 MMHG | TEMPERATURE: 96.8 F | HEIGHT: 68 IN | HEART RATE: 70 BPM

## 2024-04-01 DIAGNOSIS — J44.1 COPD EXACERBATION (MULTI): ICD-10-CM

## 2024-04-01 DIAGNOSIS — R06.02 SOB (SHORTNESS OF BREATH): ICD-10-CM

## 2024-04-01 RX ORDER — FLUTICASONE FUROATE AND VILANTEROL 100; 25 UG/1; UG/1
1 POWDER RESPIRATORY (INHALATION) DAILY
Qty: 28 EACH | Refills: 1 | Status: SHIPPED | OUTPATIENT
Start: 2024-04-01 | End: 2024-05-06

## 2024-04-03 ENCOUNTER — OFFICE VISIT (OUTPATIENT)
Dept: SURGERY | Facility: CLINIC | Age: 59
End: 2024-04-03
Payer: COMMERCIAL

## 2024-04-03 VITALS
HEIGHT: 68 IN | SYSTOLIC BLOOD PRESSURE: 132 MMHG | DIASTOLIC BLOOD PRESSURE: 76 MMHG | HEART RATE: 72 BPM | WEIGHT: 226 LBS | BODY MASS INDEX: 34.25 KG/M2

## 2024-04-03 DIAGNOSIS — K43.9 VENTRAL HERNIA WITHOUT OBSTRUCTION OR GANGRENE: Primary | ICD-10-CM

## 2024-04-03 PROCEDURE — 3078F DIAST BP <80 MM HG: CPT | Performed by: SURGERY

## 2024-04-03 PROCEDURE — 3008F BODY MASS INDEX DOCD: CPT | Performed by: SURGERY

## 2024-04-03 PROCEDURE — 99213 OFFICE O/P EST LOW 20 MIN: CPT | Performed by: SURGERY

## 2024-04-03 PROCEDURE — 1036F TOBACCO NON-USER: CPT | Performed by: SURGERY

## 2024-04-03 PROCEDURE — 3075F SYST BP GE 130 - 139MM HG: CPT | Performed by: SURGERY

## 2024-04-03 ASSESSMENT — ENCOUNTER SYMPTOMS
ABDOMINAL DISTENTION: 1
ABDOMINAL PAIN: 1

## 2024-04-03 NOTE — PROGRESS NOTES
Subjective   Patient ID: Charlie Massey is a 59 y.o. male who presents for Hernia (Umbilical hernia, had surgery about 2 years ago to fix it, noticed it again about 5 month ago).  HPI  59-year-old male status post laparoscopic ventral hernia repair in 2021 recovered uneventfully now presents with a symptomatic bulge in the same area.  No obstructive symptoms.  Review of Systems   Gastrointestinal:  Positive for abdominal distention and abdominal pain.   All other systems reviewed and are negative.      Objective   Physical Exam  Abdominal:      Comments: Abdomen soft palpable recurrence to the right of the umbilicus difficult to define at least 3 x 3 cm       Physical Exam  Constitutional:       Appearance: Normal appearance.   HENT:      Head: Normocephalic and atraumatic.      Mouth/Throat:      Pharynx: Oropharynx is clear.   Eyes:      Pupils: Pupils are equal, round, and reactive to light.   Cardiovascular:      Rate and Rhythm: Normal rate and regular rhythm.   Pulmonary:      Effort: Pulmonary effort is normal.      Breath sounds: Normal breath sounds.   Abdominal:      General: Abdomen is flat. Bowel sounds are normal.      Palpations: Abdomen is soft.   Musculoskeletal:         General: Normal range of motion.      Cervical back: Normal range of motion.   Skin:     General: Skin is warm.   Neurological:      General: No focal deficit present.      Mental Status: She is alert. Mental status is at baseline.   Psychiatric:         Mood and Affect: Mood normal.     Assessment/Plan   Patient with a recurrence after laparoscopic ventral hernia repair he is symptomatic.  Recommend CT abdomen pelvis and see back plan for repair.  Patient was offered return to his primary surgeon would like to stay with us         Les Suresh MD 04/03/24 2:30 PM

## 2024-04-18 ENCOUNTER — HOSPITAL ENCOUNTER (OUTPATIENT)
Dept: RADIOLOGY | Facility: HOSPITAL | Age: 59
Discharge: HOME | End: 2024-04-18
Payer: COMMERCIAL

## 2024-04-18 DIAGNOSIS — K43.9 VENTRAL HERNIA WITHOUT OBSTRUCTION OR GANGRENE: ICD-10-CM

## 2024-04-18 PROCEDURE — 74176 CT ABD & PELVIS W/O CONTRAST: CPT | Performed by: RADIOLOGY

## 2024-04-18 PROCEDURE — 74176 CT ABD & PELVIS W/O CONTRAST: CPT

## 2024-04-24 ENCOUNTER — OFFICE VISIT (OUTPATIENT)
Dept: SURGERY | Facility: CLINIC | Age: 59
End: 2024-04-24
Payer: COMMERCIAL

## 2024-04-24 VITALS
SYSTOLIC BLOOD PRESSURE: 132 MMHG | HEART RATE: 88 BPM | HEIGHT: 68 IN | BODY MASS INDEX: 35.31 KG/M2 | DIASTOLIC BLOOD PRESSURE: 84 MMHG | WEIGHT: 233 LBS

## 2024-04-24 DIAGNOSIS — K43.9 VENTRAL HERNIA WITHOUT OBSTRUCTION OR GANGRENE: Primary | ICD-10-CM

## 2024-04-24 PROCEDURE — 3075F SYST BP GE 130 - 139MM HG: CPT | Performed by: SURGERY

## 2024-04-24 PROCEDURE — 3008F BODY MASS INDEX DOCD: CPT | Performed by: SURGERY

## 2024-04-24 PROCEDURE — 3079F DIAST BP 80-89 MM HG: CPT | Performed by: SURGERY

## 2024-04-24 PROCEDURE — 99213 OFFICE O/P EST LOW 20 MIN: CPT | Performed by: SURGERY

## 2024-04-24 PROCEDURE — 1036F TOBACCO NON-USER: CPT | Performed by: SURGERY

## 2024-04-24 RX ORDER — CEFAZOLIN SODIUM 2 G/100ML
2 INJECTION, SOLUTION INTRAVENOUS ONCE
Status: CANCELLED | OUTPATIENT
Start: 2024-04-24 | End: 2024-04-24

## 2024-04-24 RX ORDER — HEPARIN SODIUM 5000 [USP'U]/ML
5000 INJECTION, SOLUTION INTRAVENOUS; SUBCUTANEOUS ONCE
Status: CANCELLED | OUTPATIENT
Start: 2024-04-24 | End: 2024-04-24

## 2024-04-24 ASSESSMENT — ENCOUNTER SYMPTOMS
ABDOMINAL PAIN: 1
ABDOMINAL DISTENTION: 1

## 2024-04-24 NOTE — H&P (VIEW-ONLY)
Subjective   Patient ID: Charlie Massey is a 59 y.o. male who presents for Follow-up.  HPI  Patient here for follow-up after CT scan continues to have some discomfort in the abdomen lateral to the previous repair  Review of Systems   Gastrointestinal:  Positive for abdominal distention and abdominal pain.   All other systems reviewed and are negative.      Objective   Physical Exam  Abdominal:      Comments: Abdomen soft exam unchanged     Physical Exam  Constitutional:       Appearance: Normal appearance.   HENT:      Head: Normocephalic and atraumatic.      Mouth/Throat:      Pharynx: Oropharynx is clear.   Eyes:      Pupils: Pupils are equal, round, and reactive to light.   Cardiovascular:      Rate and Rhythm: Normal rate and regular rhythm.   Pulmonary:      Effort: Pulmonary effort is normal.      Breath sounds: Normal breath sounds.   Abdominal:      General: Abdomen is flat. Bowel sounds are normal.      Palpations: Abdomen is soft.   Musculoskeletal:         General: Normal range of motion.      Cervical back: Normal range of motion.   Skin:     General: Skin is warm.   Neurological:      General: No focal deficit present.      Mental Status: She is alert. Mental status is at baseline.   Psychiatric:         Mood and Affect: Mood normal.       Assessment/Plan   CT abdomen pelvis wo IV contrast    Result Date: 4/19/2024  Interpreted By:  Mike Carney, STUDY: CT ABDOMEN PELVIS WO IV CONTRAST;  4/18/2024 10:36 am   INDICATION: Signs/Symptoms:Recurrent ventral hernia symptomatic.   COMPARISON: None.   ACCESSION NUMBER(S): [id number]   ORDERING CLINICIAN: GRIS BERMAN   TECHNIQUE: CT of the abdomen and pelvis was performed.  Contiguous axial images were obtained at 3 mm slice thickness through the abdomen and pelvis. Coronal and sagittal reconstructions at 3 mm slice thickness were performed. The patient received no intravenous contrast. Please note evaluation of the solid organs and vessels is limited in the  absence of intravenous contrast. Given this caveat, the following observations are made:   FINDINGS: LOWER CHEST: Clear lung bases.   ABDOMEN:   LIVER: Steatotic.   BILE DUCTS: Not dilated.   GALLBLADDER: Contracted. No calcified stones.   PANCREAS: Unremarkable   SPLEEN: Unremarkable   ADRENAL GLANDS: Unremarkable   KIDNEYS AND URETERS: Unremarkable without hydronephrosis. No urinary tract calculus identified.   PELVIS:   BLADDER: Nondistended.   REPRODUCTIVE ORGANS: Small fat containing right inguinal hernia.   BOWEL: Severe pancolonic diverticulosis without findings of diverticulitis. No findings of bowel obstruction or inflammation. Possible small sliding hiatal hernia. Long segment of bowel protrudes through hernia described below. Unremarkable appendix.    Unremarkable mesentery.     VESSELS: No abdominal aortic aneurysm.Mild atherosclerosis.   PERITONEUM AND RETROPERITONEUM: No free fluid or free air. No abdominal or pelvic lymphadenopathy.   BONE AND ABDOMINAL WALL: Lumbar degenerative changes.   *There is mesh in the supraumbilical abdomen from prior hernia repair. At the caudal margin of the mesh there is a right paramedian periumbilical hernia containing a long segment of nonobstructed small bowel. Herniated contents measure 8 x 4 x 4 cm. Abdominal wall defect measures 3.8 cm transverse by 3.0 cm cephalocaudal.         Periumbilical hernia containing a long segment of nonobstructed small bowel at the caudal margin of prior ventral abdominal hernia repair as described. *   MACRO: None.   Signed by: Mike Carney 4/19/2024 8:40 AM Dictation workstation:   [id number]       CT confirms recurrent ventral hernia.  Discussed in detail with the patient recommend repeat repair as there is small bowel contents and he is symptomatic.  Procedure risks versus alternatives discussed in detail with patient he agrees proceed at this time    Possibility of bleeding infection higher chance of another recurrence reviewed  with the patient and he consents to proceed at this time    Need 3 hours, put on a lighter day         Les Suresh MD 04/24/24 2:04 PM

## 2024-04-24 NOTE — PROGRESS NOTES
Subjective   Patient ID: Charlie Massey is a 59 y.o. male who presents for Follow-up.  HPI  Patient here for follow-up after CT scan continues to have some discomfort in the abdomen lateral to the previous repair  Review of Systems   Gastrointestinal:  Positive for abdominal distention and abdominal pain.   All other systems reviewed and are negative.      Objective   Physical Exam  Abdominal:      Comments: Abdomen soft exam unchanged     Physical Exam  Constitutional:       Appearance: Normal appearance.   HENT:      Head: Normocephalic and atraumatic.      Mouth/Throat:      Pharynx: Oropharynx is clear.   Eyes:      Pupils: Pupils are equal, round, and reactive to light.   Cardiovascular:      Rate and Rhythm: Normal rate and regular rhythm.   Pulmonary:      Effort: Pulmonary effort is normal.      Breath sounds: Normal breath sounds.   Abdominal:      General: Abdomen is flat. Bowel sounds are normal.      Palpations: Abdomen is soft.   Musculoskeletal:         General: Normal range of motion.      Cervical back: Normal range of motion.   Skin:     General: Skin is warm.   Neurological:      General: No focal deficit present.      Mental Status: She is alert. Mental status is at baseline.   Psychiatric:         Mood and Affect: Mood normal.       Assessment/Plan   CT abdomen pelvis wo IV contrast    Result Date: 4/19/2024  Interpreted By:  Mike Carney, STUDY: CT ABDOMEN PELVIS WO IV CONTRAST;  4/18/2024 10:36 am   INDICATION: Signs/Symptoms:Recurrent ventral hernia symptomatic.   COMPARISON: None.   ACCESSION NUMBER(S): [id number]   ORDERING CLINICIAN: GRIS BERMAN   TECHNIQUE: CT of the abdomen and pelvis was performed.  Contiguous axial images were obtained at 3 mm slice thickness through the abdomen and pelvis. Coronal and sagittal reconstructions at 3 mm slice thickness were performed. The patient received no intravenous contrast. Please note evaluation of the solid organs and vessels is limited in the  absence of intravenous contrast. Given this caveat, the following observations are made:   FINDINGS: LOWER CHEST: Clear lung bases.   ABDOMEN:   LIVER: Steatotic.   BILE DUCTS: Not dilated.   GALLBLADDER: Contracted. No calcified stones.   PANCREAS: Unremarkable   SPLEEN: Unremarkable   ADRENAL GLANDS: Unremarkable   KIDNEYS AND URETERS: Unremarkable without hydronephrosis. No urinary tract calculus identified.   PELVIS:   BLADDER: Nondistended.   REPRODUCTIVE ORGANS: Small fat containing right inguinal hernia.   BOWEL: Severe pancolonic diverticulosis without findings of diverticulitis. No findings of bowel obstruction or inflammation. Possible small sliding hiatal hernia. Long segment of bowel protrudes through hernia described below. Unremarkable appendix.    Unremarkable mesentery.     VESSELS: No abdominal aortic aneurysm.Mild atherosclerosis.   PERITONEUM AND RETROPERITONEUM: No free fluid or free air. No abdominal or pelvic lymphadenopathy.   BONE AND ABDOMINAL WALL: Lumbar degenerative changes.   *There is mesh in the supraumbilical abdomen from prior hernia repair. At the caudal margin of the mesh there is a right paramedian periumbilical hernia containing a long segment of nonobstructed small bowel. Herniated contents measure 8 x 4 x 4 cm. Abdominal wall defect measures 3.8 cm transverse by 3.0 cm cephalocaudal.         Periumbilical hernia containing a long segment of nonobstructed small bowel at the caudal margin of prior ventral abdominal hernia repair as described. *   MACRO: None.   Signed by: Mike Carney 4/19/2024 8:40 AM Dictation workstation:   [id number]       CT confirms recurrent ventral hernia.  Discussed in detail with the patient recommend repeat repair as there is small bowel contents and he is symptomatic.  Procedure risks versus alternatives discussed in detail with patient he agrees proceed at this time    Possibility of bleeding infection higher chance of another recurrence reviewed  with the patient and he consents to proceed at this time    Need 3 hours, put on a lighter day         Les Suresh MD 04/24/24 2:04 PM

## 2024-05-02 NOTE — PREPROCEDURE INSTRUCTIONS
Pre-op instructions reviewed with patient including NPO status, where to check in for procedure and having  available after.

## 2024-05-03 ENCOUNTER — ANESTHESIA EVENT (OUTPATIENT)
Dept: OPERATING ROOM | Facility: HOSPITAL | Age: 59
End: 2024-05-03
Payer: COMMERCIAL

## 2024-05-04 SDOH — HEALTH STABILITY: MENTAL HEALTH: CURRENT SMOKER: 0

## 2024-05-04 NOTE — ANESTHESIA PREPROCEDURE EVALUATION
Charlie Massey is a 59 y.o. male here for:    Repair Ventral Hernia Laparoscopy  With Les AUSTIN MD  Pre-Op Diagnosis Codes:     * Hernia [K43.9]    Relevant Problems   Cardiac  Echo 3/2024:  CONCLUSIONS:   1. Left ventricular systolic function is normal with a 65-70% estimated ejection fraction.   2. Spectral Doppler shows an abnormal pattern of left ventricular diastolic filling.   3. Normal RV size and function      Mild pulmonary hypertension RVSP 39 mmHg.   4. Normal mitral valve.   5. Aortic valve is tricuspid. Minimal sclerosis no evidence hemodynamically significant stenosis or regurgitation.    Cardiac cath 3/2024:  CONCLUSIONS:   1. The entire Left Main: 0% stenosis.   2. Entire LAD Lesion: The percent stenosis is 0%.   3. Entire CX Lesion: The percent stenosis is 0%.   4. The entire RCA Lesion: The percent stenosis is 0%.   5. The Left Ventricular Ejection Fraction is 50%     (+) Angina, class III (CMS-[facility])   (+) ECG abnormality   (+) Hyperlipidemia   (+) Hypertension      Pulmonary   (+) COPD exacerbation (Multi)   (+) Dyspnea on exertion      Neuro   (+) Generalized anxiety disorder   (+) Recurrent major depressive disorder, in partial remission (CMS-HCC)      Endocrine   (+) Class 2 obesity due to excess calories without serious comorbidity with body mass index (BMI) of 35.0 to 35.9 in adult      Musculoskeletal   (+) Primary osteoarthritis of left knee      ID   (+) Upper respiratory tract infection       Lab Results   Component Value Date    HGB 15.1 03/15/2024    HCT 45.0 03/15/2024    WBC 6.8 03/15/2024     03/15/2024     03/15/2024    K 3.7 03/15/2024     03/15/2024    CREATININE 0.90 03/15/2024    BUN 22 03/15/2024       Social History     Tobacco Use   Smoking Status Former    Current packs/day: 0.00    Types: Cigarettes    Quit date:     Years since quittin.3   Smokeless Tobacco Never       Allergies   Allergen Reactions    Codeine Nausea And Vomiting, Unknown  and Other       Current Outpatient Medications   Medication Instructions    albuterol (ProAir HFA) 90 mcg/actuation inhaler 2 puffs, inhalation, Every 4 hours PRN    aspirin 81 mg, oral, Daily    celecoxib (CELEBREX) 200 mg, oral, Daily PRN    fenofibrate (TRIGLIDE) 160 mg, oral, Every Day    fluticasone (Flonase) 50 mcg/actuation nasal spray 1 spray, Each Nostril, Daily, Shake gently. Before first use, prime pump. After use, clean tip and replace cap.    fluticasone furoate-vilanteroL (Breo Ellipta) 100-25 mcg/dose inhaler 1 puff, inhalation, Daily    hydroCHLOROthiazide (MICROZIDE) 12.5 mg, oral, Every Day    losartan (COZAAR) 25 mg, oral, Every Day    rosuvastatin (CRESTOR) 10 mg, oral, Daily       Past Surgical History:   Procedure Laterality Date    CARDIAC CATHETERIZATION      CARDIAC CATHETERIZATION N/A 03/22/2024    Procedure: Left Heart Cath, With LV;  Surgeon: Cristhian Clements MD;  Location: [facility];  Service: Cardiovascular;  Laterality: N/A;  Metoprolol tartrate 25 mg and Ranexa 500 mg on admit  If cath is negative, refer to Pulmonary    COLONOSCOPY      HERNIA REPAIR      KIDNEY STONE SURGERY      TONSILLECTOMY      TOTAL KNEE ARTHROPLASTY Left     TKR       Family History   Problem Relation Name Age of Onset    Diabetes type II Mother      Other (cardiac disorder) Father      Hypertension Father         NPO Details:  No data recorded    Physical Exam    Airway  Mallampati: II  TM distance: >3 FB  Neck ROM: full     Cardiovascular - normal exam     Dental    Pulmonary - normal exam     Abdominal            Anesthesia Plan    History of general anesthesia?: yes  History of complications of general anesthesia?: no    ASA 3     general     The patient is not a current smoker.    intravenous induction   Postoperative administration of opioids is intended.  Trial extubation is planned.  Anesthetic plan and risks discussed with patient.

## 2024-05-06 ENCOUNTER — HOSPITAL ENCOUNTER (OUTPATIENT)
Facility: HOSPITAL | Age: 59
Setting detail: OUTPATIENT SURGERY
Discharge: HOME | End: 2024-05-06
Attending: SURGERY | Admitting: SURGERY
Payer: COMMERCIAL

## 2024-05-06 ENCOUNTER — ANESTHESIA (OUTPATIENT)
Dept: OPERATING ROOM | Facility: HOSPITAL | Age: 59
End: 2024-05-06
Payer: COMMERCIAL

## 2024-05-06 VITALS
BODY MASS INDEX: 34.62 KG/M2 | HEIGHT: 68 IN | HEART RATE: 83 BPM | WEIGHT: 228.4 LBS | DIASTOLIC BLOOD PRESSURE: 80 MMHG | RESPIRATION RATE: 16 BRPM | OXYGEN SATURATION: 94 % | TEMPERATURE: 96.8 F | SYSTOLIC BLOOD PRESSURE: 142 MMHG

## 2024-05-06 DIAGNOSIS — K43.9 VENTRAL HERNIA WITHOUT OBSTRUCTION OR GANGRENE: Primary | ICD-10-CM

## 2024-05-06 PROCEDURE — 49615 RPR AA HRN RCR 3-10 RDC: CPT | Performed by: SURGERY

## 2024-05-06 PROCEDURE — 2500000004 HC RX 250 GENERAL PHARMACY W/ HCPCS (ALT 636 FOR OP/ED): Mod: JZ | Performed by: SURGERY

## 2024-05-06 PROCEDURE — 2720000007 HC OR 272 NO HCPCS: Performed by: SURGERY

## 2024-05-06 PROCEDURE — 3700000002 HC GENERAL ANESTHESIA TIME - EACH INCREMENTAL 1 MINUTE: Performed by: SURGERY

## 2024-05-06 PROCEDURE — C1781 MESH (IMPLANTABLE): HCPCS | Performed by: SURGERY

## 2024-05-06 PROCEDURE — 3700000001 HC GENERAL ANESTHESIA TIME - INITIAL BASE CHARGE: Performed by: SURGERY

## 2024-05-06 PROCEDURE — 2500000005 HC RX 250 GENERAL PHARMACY W/O HCPCS: Performed by: STUDENT IN AN ORGANIZED HEALTH CARE EDUCATION/TRAINING PROGRAM

## 2024-05-06 PROCEDURE — 2500000004 HC RX 250 GENERAL PHARMACY W/ HCPCS (ALT 636 FOR OP/ED): Performed by: STUDENT IN AN ORGANIZED HEALTH CARE EDUCATION/TRAINING PROGRAM

## 2024-05-06 PROCEDURE — 7100000009 HC PHASE TWO TIME - INITIAL BASE CHARGE: Performed by: SURGERY

## 2024-05-06 PROCEDURE — 3600000004 HC OR TIME - INITIAL BASE CHARGE - PROCEDURE LEVEL FOUR: Performed by: SURGERY

## 2024-05-06 PROCEDURE — 7100000001 HC RECOVERY ROOM TIME - INITIAL BASE CHARGE: Performed by: SURGERY

## 2024-05-06 PROCEDURE — A4217 STERILE WATER/SALINE, 500 ML: HCPCS | Performed by: SURGERY

## 2024-05-06 PROCEDURE — 2780000003 HC OR 278 NO HCPCS: Performed by: SURGERY

## 2024-05-06 PROCEDURE — 3600000009 HC OR TIME - EACH INCREMENTAL 1 MINUTE - PROCEDURE LEVEL FOUR: Performed by: SURGERY

## 2024-05-06 PROCEDURE — 96372 THER/PROPH/DIAG INJ SC/IM: CPT | Mod: 59 | Performed by: SURGERY

## 2024-05-06 PROCEDURE — 2500000005 HC RX 250 GENERAL PHARMACY W/O HCPCS: Performed by: SURGERY

## 2024-05-06 PROCEDURE — 7100000002 HC RECOVERY ROOM TIME - EACH INCREMENTAL 1 MINUTE: Performed by: SURGERY

## 2024-05-06 PROCEDURE — 2500000004 HC RX 250 GENERAL PHARMACY W/ HCPCS (ALT 636 FOR OP/ED): Performed by: SURGERY

## 2024-05-06 PROCEDURE — 7100000010 HC PHASE TWO TIME - EACH INCREMENTAL 1 MINUTE: Performed by: SURGERY

## 2024-05-06 DEVICE — COMPOSITE MESH,MONOFILAMENT POLYESTER WITH ABSORBABLE COLLAGEN FILM AND MARKING
Type: IMPLANTABLE DEVICE | Site: ABDOMEN | Status: NON-FUNCTIONAL
Brand: SYMBOTEX

## 2024-05-06 DEVICE — COMPOSITE MESH,MONOFILAMENT POLYESTER WITH ABSORBABLE COLLAGEN FILM AND MARKING
Type: IMPLANTABLE DEVICE | Site: ABDOMEN | Status: FUNCTIONAL
Brand: SYMBOTEX

## 2024-05-06 RX ORDER — OXYCODONE HYDROCHLORIDE 5 MG/1
10 TABLET ORAL EVERY 4 HOURS PRN
Status: CANCELLED | OUTPATIENT
Start: 2024-05-06

## 2024-05-06 RX ORDER — KETOROLAC TROMETHAMINE 30 MG/ML
INJECTION, SOLUTION INTRAMUSCULAR; INTRAVENOUS AS NEEDED
Status: DISCONTINUED | OUTPATIENT
Start: 2024-05-06 | End: 2024-05-06

## 2024-05-06 RX ORDER — OXYCODONE HYDROCHLORIDE 5 MG/1
5 TABLET ORAL EVERY 4 HOURS PRN
Status: CANCELLED | OUTPATIENT
Start: 2024-05-06

## 2024-05-06 RX ORDER — MIDAZOLAM HYDROCHLORIDE 1 MG/ML
INJECTION, SOLUTION INTRAMUSCULAR; INTRAVENOUS AS NEEDED
Status: DISCONTINUED | OUTPATIENT
Start: 2024-05-06 | End: 2024-05-06

## 2024-05-06 RX ORDER — HEPARIN SODIUM 5000 [USP'U]/ML
5000 INJECTION, SOLUTION INTRAVENOUS; SUBCUTANEOUS ONCE
Status: COMPLETED | OUTPATIENT
Start: 2024-05-06 | End: 2024-05-06

## 2024-05-06 RX ORDER — HYDRALAZINE HYDROCHLORIDE 20 MG/ML
5 INJECTION INTRAMUSCULAR; INTRAVENOUS EVERY 30 MIN PRN
Status: CANCELLED | OUTPATIENT
Start: 2024-05-06

## 2024-05-06 RX ORDER — LABETALOL HYDROCHLORIDE 5 MG/ML
5 INJECTION, SOLUTION INTRAVENOUS ONCE AS NEEDED
Status: CANCELLED | OUTPATIENT
Start: 2024-05-06

## 2024-05-06 RX ORDER — ONDANSETRON HYDROCHLORIDE 2 MG/ML
INJECTION, SOLUTION INTRAVENOUS AS NEEDED
Status: DISCONTINUED | OUTPATIENT
Start: 2024-05-06 | End: 2024-05-06

## 2024-05-06 RX ORDER — HYDROMORPHONE HYDROCHLORIDE 1 MG/ML
INJECTION, SOLUTION INTRAMUSCULAR; INTRAVENOUS; SUBCUTANEOUS AS NEEDED
Status: DISCONTINUED | OUTPATIENT
Start: 2024-05-06 | End: 2024-05-06

## 2024-05-06 RX ORDER — SODIUM CHLORIDE 0.9 G/100ML
IRRIGANT IRRIGATION AS NEEDED
Status: DISCONTINUED | OUTPATIENT
Start: 2024-05-06 | End: 2024-05-06 | Stop reason: HOSPADM

## 2024-05-06 RX ORDER — CEFAZOLIN SODIUM 2 G/100ML
2 INJECTION, SOLUTION INTRAVENOUS ONCE
Status: COMPLETED | OUTPATIENT
Start: 2024-05-06 | End: 2024-05-06

## 2024-05-06 RX ORDER — MEPERIDINE HYDROCHLORIDE 25 MG/ML
12.5 INJECTION INTRAMUSCULAR; INTRAVENOUS; SUBCUTANEOUS EVERY 10 MIN PRN
Status: CANCELLED | OUTPATIENT
Start: 2024-05-06

## 2024-05-06 RX ORDER — ROCURONIUM BROMIDE 10 MG/ML
INJECTION, SOLUTION INTRAVENOUS AS NEEDED
Status: DISCONTINUED | OUTPATIENT
Start: 2024-05-06 | End: 2024-05-06

## 2024-05-06 RX ORDER — BUPIVACAINE HCL/EPINEPHRINE 0.25-.0005
VIAL (ML) INJECTION AS NEEDED
Status: DISCONTINUED | OUTPATIENT
Start: 2024-05-06 | End: 2024-05-06 | Stop reason: HOSPADM

## 2024-05-06 RX ORDER — SODIUM CHLORIDE, SODIUM LACTATE, POTASSIUM CHLORIDE, CALCIUM CHLORIDE 600; 310; 30; 20 MG/100ML; MG/100ML; MG/100ML; MG/100ML
50 INJECTION, SOLUTION INTRAVENOUS CONTINUOUS
Status: DISCONTINUED | OUTPATIENT
Start: 2024-05-06 | End: 2024-05-06 | Stop reason: HOSPADM

## 2024-05-06 RX ORDER — DIPHENHYDRAMINE HYDROCHLORIDE 50 MG/ML
12.5 INJECTION INTRAMUSCULAR; INTRAVENOUS ONCE AS NEEDED
Status: CANCELLED | OUTPATIENT
Start: 2024-05-06

## 2024-05-06 RX ORDER — LIDOCAINE HYDROCHLORIDE 20 MG/ML
INJECTION, SOLUTION INFILTRATION; PERINEURAL AS NEEDED
Status: DISCONTINUED | OUTPATIENT
Start: 2024-05-06 | End: 2024-05-06

## 2024-05-06 RX ORDER — FENTANYL CITRATE 50 UG/ML
25 INJECTION, SOLUTION INTRAMUSCULAR; INTRAVENOUS EVERY 5 MIN PRN
Status: CANCELLED | OUTPATIENT
Start: 2024-05-06

## 2024-05-06 RX ORDER — HYDROCODONE BITARTRATE AND ACETAMINOPHEN 5; 325 MG/1; MG/1
1 TABLET ORAL EVERY 6 HOURS PRN
Qty: 25 TABLET | Refills: 0 | Status: SHIPPED | OUTPATIENT
Start: 2024-05-06 | End: 2024-05-13

## 2024-05-06 RX ORDER — PROPOFOL 10 MG/ML
INJECTION, EMULSION INTRAVENOUS AS NEEDED
Status: DISCONTINUED | OUTPATIENT
Start: 2024-05-06 | End: 2024-05-06

## 2024-05-06 RX ORDER — ALBUTEROL SULFATE 0.83 MG/ML
2.5 SOLUTION RESPIRATORY (INHALATION) ONCE AS NEEDED
Status: CANCELLED | OUTPATIENT
Start: 2024-05-06

## 2024-05-06 RX ORDER — ONDANSETRON HYDROCHLORIDE 2 MG/ML
4 INJECTION, SOLUTION INTRAVENOUS ONCE AS NEEDED
Status: CANCELLED | OUTPATIENT
Start: 2024-05-06

## 2024-05-06 RX ORDER — FENTANYL CITRATE 50 UG/ML
INJECTION, SOLUTION INTRAMUSCULAR; INTRAVENOUS AS NEEDED
Status: DISCONTINUED | OUTPATIENT
Start: 2024-05-06 | End: 2024-05-06

## 2024-05-06 RX ORDER — GABAPENTIN 300 MG/1
300 CAPSULE ORAL 3 TIMES DAILY PRN
Qty: 20 CAPSULE | Refills: 0 | Status: SHIPPED | OUTPATIENT
Start: 2024-05-06 | End: 2025-05-06

## 2024-05-06 RX ORDER — SODIUM CHLORIDE, SODIUM LACTATE, POTASSIUM CHLORIDE, CALCIUM CHLORIDE 600; 310; 30; 20 MG/100ML; MG/100ML; MG/100ML; MG/100ML
100 INJECTION, SOLUTION INTRAVENOUS CONTINUOUS
Status: CANCELLED | OUTPATIENT
Start: 2024-05-06

## 2024-05-06 RX ADMIN — KETOROLAC TROMETHAMINE 30 MG: 30 INJECTION, SOLUTION INTRAMUSCULAR at 12:22

## 2024-05-06 RX ADMIN — ROCURONIUM BROMIDE 70 MG: 10 SOLUTION INTRAVENOUS at 10:08

## 2024-05-06 RX ADMIN — HEPARIN SODIUM 5000 UNITS: 5000 INJECTION INTRAVENOUS; SUBCUTANEOUS at 08:13

## 2024-05-06 RX ADMIN — HYDROMORPHONE HYDROCHLORIDE 0.5 MG: 1 INJECTION, SOLUTION INTRAMUSCULAR; INTRAVENOUS; SUBCUTANEOUS at 12:22

## 2024-05-06 RX ADMIN — FENTANYL CITRATE 50 MCG: 50 INJECTION, SOLUTION INTRAMUSCULAR; INTRAVENOUS at 10:10

## 2024-05-06 RX ADMIN — FENTANYL CITRATE 100 MCG: 50 INJECTION, SOLUTION INTRAMUSCULAR; INTRAVENOUS at 10:05

## 2024-05-06 RX ADMIN — ONDANSETRON 4 MG: 2 INJECTION, SOLUTION INTRAMUSCULAR; INTRAVENOUS at 12:22

## 2024-05-06 RX ADMIN — DEXAMETHASONE SODIUM PHOSPHATE 8 MG: 4 INJECTION, SOLUTION INTRAMUSCULAR; INTRAVENOUS at 10:32

## 2024-05-06 RX ADMIN — SUGAMMADEX 200 MG: 100 INJECTION, SOLUTION INTRAVENOUS at 12:32

## 2024-05-06 RX ADMIN — FENTANYL CITRATE 50 MCG: 50 INJECTION, SOLUTION INTRAMUSCULAR; INTRAVENOUS at 12:35

## 2024-05-06 RX ADMIN — CEFAZOLIN SODIUM 2 G: 2 INJECTION, SOLUTION INTRAVENOUS at 10:10

## 2024-05-06 RX ADMIN — ROCURONIUM BROMIDE 20 MG: 10 SOLUTION INTRAVENOUS at 11:05

## 2024-05-06 RX ADMIN — FENTANYL CITRATE 50 MCG: 50 INJECTION, SOLUTION INTRAMUSCULAR; INTRAVENOUS at 10:48

## 2024-05-06 RX ADMIN — SODIUM CHLORIDE, POTASSIUM CHLORIDE, SODIUM LACTATE AND CALCIUM CHLORIDE 50 ML/HR: 600; 310; 30; 20 INJECTION, SOLUTION INTRAVENOUS at 08:32

## 2024-05-06 RX ADMIN — PROPOFOL 200 MG: 10 INJECTION, EMULSION INTRAVENOUS at 10:07

## 2024-05-06 RX ADMIN — HYDROMORPHONE HYDROCHLORIDE 0.5 MG: 1 INJECTION, SOLUTION INTRAMUSCULAR; INTRAVENOUS; SUBCUTANEOUS at 11:13

## 2024-05-06 RX ADMIN — LIDOCAINE HYDROCHLORIDE 100 MG: 20 INJECTION, SOLUTION INFILTRATION; PERINEURAL at 10:06

## 2024-05-06 RX ADMIN — MIDAZOLAM 2 MG: 1 INJECTION INTRAMUSCULAR; INTRAVENOUS at 10:03

## 2024-05-06 RX ADMIN — ROCURONIUM BROMIDE 20 MG: 10 SOLUTION INTRAVENOUS at 11:43

## 2024-05-06 RX ADMIN — ROCURONIUM BROMIDE 30 MG: 10 SOLUTION INTRAVENOUS at 10:51

## 2024-05-06 ASSESSMENT — PAIN - FUNCTIONAL ASSESSMENT
PAIN_FUNCTIONAL_ASSESSMENT: 0-10

## 2024-05-06 ASSESSMENT — COLUMBIA-SUICIDE SEVERITY RATING SCALE - C-SSRS
1. IN THE PAST MONTH, HAVE YOU WISHED YOU WERE DEAD OR WISHED YOU COULD GO TO SLEEP AND NOT WAKE UP?: NO
6. HAVE YOU EVER DONE ANYTHING, STARTED TO DO ANYTHING, OR PREPARED TO DO ANYTHING TO END YOUR LIFE?: NO
2. HAVE YOU ACTUALLY HAD ANY THOUGHTS OF KILLING YOURSELF?: NO

## 2024-05-06 ASSESSMENT — PAIN SCALES - GENERAL
PAINLEVEL_OUTOF10: 0 - NO PAIN
PAIN_LEVEL: 1

## 2024-05-06 NOTE — OP NOTE
LAPAROSCOPIC REPAIR OF RECURRENT VENTRAL HERNIA, LYSIS OF ADHESIONS Operative Note     Date: 2024  OR Location: [facility] OR    Name: Charlie Massey, : 1965, Age: 59 y.o., MRN: [id number], Sex: male    Diagnosis  Pre-op Diagnosis     * Ventral hernia without obstruction or gangrene [K43.9] Post-op Diagnosis     * Ventral hernia without obstruction or gangrene [K43.9]     Procedures  LAPAROSCOPIC REPAIR OF RECURRENT VENTRAL HERNIA, LYSIS OF ADHESIONS  [id number] - AR RPR AA HERNIA RECR 3-10 CM REDUCIBLE      Surgeons      * Les Suresh V - Primary    Resident/Fellow/Other Assistant:  Fahad Kumar    Procedure Summary  Anesthesia: General  ASA: III  Anesthesia Staff: Anesthesiologist: Kemal Leggett DO  Estimated Blood Loss: Minimal mL  Intra-op Medications:   Administrations occurring from 0900 to 1200 on 24:   Medication Name Total Dose   sodium chloride 0.9 % irrigation solution 3,000 mL   ceFAZolin in dextrose (iso-os) (Ancef) IVPB 2 g 2 g              Anesthesia Record               Intraprocedure I/O Totals          Intake    lactated Ringer's infusion 1500.00 mL    ceFAZolin in dextrose (iso-os) (Ancef) IVPB 2 g 100.00 mL    Total Intake 1600 mL       Output    Urine 300 mL    Total Output 300 mL       Net    Net Volume 1300 mL          Specimen: No specimens collected     Staff:   Circulator: Juana Mckeon RN  Relief Scrub: Lee Ann Etsrada  Scrub Person: Tierney Wheeler         Drains and/or Catheters:   [REMOVED] Urethral Catheter 16 Fr. (Removed)       Tourniquet Times:         Implants:  Implants       Type Name Action Serial No.      Surgical Mesh Sling Implant MESH, STEX, SYMBOTEX, COMPOSITE, 12 CM - [id number] Implanted      Surgical Mesh Sling Implant MESH, STEX, SYMBOTEX, COMPOSITE, 9 CM - [id number] Wasted               Findings: Recurrent ventral hernia of 4 x 4 cm inferior aspect    Indications: Charlie Massey is an 59 y.o. male who is having surgery for Ventral hernia without obstruction  or gangrene [K43.9].     The patient was seen in the preoperative area. The risks, benefits, complications, treatment options, non-operative alternatives, expected recovery and outcomes were discussed with the patient. The possibilities of reaction to medication, pulmonary aspiration, injury to surrounding structures, bleeding, recurrent infection, the need for additional procedures, failure to diagnose a condition, and creating a complication requiring transfusion or operation were discussed with the patient. The patient concurred with the proposed plan, giving informed consent.  The site of surgery was properly noted/marked if necessary per policy. The patient has been actively warmed in preoperative area. Preoperative antibiotics have been ordered and given within 1 hours of incision. Venous thrombosis prophylaxis have been ordered including bilateral sequential compression devices and chemical prophylaxis    Procedure Details: Patient was brought to the OR laid supine preoperative huddle was performed and all team members participated.  Patient was then placed under general anesthesia.  Livingston catheter and orogastric tube were placed.  Abdomen was widely prepped and draped in standard surgical fashion.  Timeout procedures were observed and we elected to proceed at this time.  Local anesthetic was instilled in the left upper quadrant through previous incision and incision made with 11 blade and abdominal access gained using the Visiport device atraumatically.  Pneumoperitoneum was initiated and the abdomen was surveyed.  Patient had omental adhesions in the upper quadrant in the center of the abdomen.  Under direct vision two 5 mm ports were placed on the left side and the camera was transferred.  Entry site was inspected no injuries were noted.  Patient had a loop of small bowel at the inferior aspect of the previous mesh this was taken down without injury with cold scissors.  The omental adhesions were taken  down bluntly and with cautery when safe away from bowel. and the omentum was taken down completely without injury utilizing hydrodissection as needed.    The abdomen was then surveyed previous mesh was intact flat and noninfected.  The recurrence was inferiorly and measured 4 x 4 cm.  The defect was closed using 3-0 Prolene sutures in interrupted fashion using the Endo Close device.  A 12 cm² light weight mesh was placed into the abdomen opened.  It had been marked in the center and at the 4 corners using 0 Prolene sutures.  The center was brought up and the 4 corners were tied using the Endo Close through separate puncture wounds under lower pneumoperitoneum.  Excellent coverage was noted.  A double crown technique was used with tacks.  Further 0 Prolene sutures were placed through and through the mesh x 2 as needed to secure the new mesh in a flat fashion with excellent coverage.  Resulting repair was quite extensive in terms of coverage and satisfactory.  The entry site was closed with a 0 Prolene in a figure-of-eight fashion using the Endo Close device gas was released excess fluid was suctioned all ports were removed.  Patient tolerated procedure well discussed with wife in detail  Complications:  None; patient tolerated the procedure well.    Disposition: PACU - hemodynamically stable.  Condition: stable         Additional Details:     Attending Attestation: I performed the procedure.    Les Suresh V  Phone Number: [phone]

## 2024-05-06 NOTE — ANESTHESIA POSTPROCEDURE EVALUATION
Patient: Charlie Massey    Procedure Summary       Date: 05/06/24 Room / Location: [facility] OR 07 / [facility]    Anesthesia Start: 1000 Anesthesia Stop: 1250    Procedure: LAPAROSCOPIC REPAIR OF RECURRENT VENTRAL HERNIA, LYSIS OF ADHESIONS Diagnosis:       Ventral hernia without obstruction or gangrene      (Ventral hernia without obstruction or gangrene [K43.9])    Surgeons: Les AUSTIN MD Responsible Provider: Kemal Leggett DO    Anesthesia Type: general ASA Status: 3            Anesthesia Type: general    Vitals Value Taken Time   /70 05/06/24 1327   Temp 36 °C (96.8 °F) 05/06/24 1311   Pulse 80 05/06/24 1327   Resp 10 05/06/24 1327   SpO2 95 % 05/06/24 1328   Vitals shown include unfiled device data.    Anesthesia Post Evaluation    Patient location during evaluation: PACU  Patient participation: complete - patient participated  Level of consciousness: awake  Pain score: 1  Pain management: adequate  Airway patency: patent  Cardiovascular status: acceptable, blood pressure returned to baseline and hemodynamically stable  Respiratory status: acceptable, nonlabored ventilation, spontaneous ventilation and face mask  Hydration status: acceptable  Postoperative Nausea and Vomiting: none        No notable events documented.

## 2024-05-06 NOTE — NURSING NOTE
OK by Dr. Farris for pt to home without voiding. Bladder scanned for 43-91cc of urine.  Pt has no complaint of pain.

## 2024-05-06 NOTE — ANESTHESIA PROCEDURE NOTES
Peripheral IV  Date/Time: 5/6/2024 10:18 AM  Inserted by: Kemal Leggett DO    Placement  Needle size: 20 G  Laterality: left  Location: hand  Local anesthetic: Under GA.  Site prep: alcohol  Technique: anatomical landmarks  Attempts: 1

## 2024-05-06 NOTE — ANESTHESIA PROCEDURE NOTES
Airway  Date/Time: 5/6/2024 10:09 AM  Urgency: elective    Airway not difficult    Staffing  Performed: attending   Authorized by: Kemal Leggett DO    Performed by: Kemal Leggett DO  Patient location during procedure: OR    Indications and Patient Condition  Indications for airway management: anesthesia  Spontaneous Ventilation: absent  Sedation level: deep  Preoxygenated: yes  Patient position: sniffing  Mask difficulty assessment: 3 - difficult mask (inadequate, unstable or two providers) +/- NMBA  Planned trial extubation    Final Airway Details  Final airway type: endotracheal airway      Successful airway: ETT  Cuffed: yes   Successful intubation technique: video laryngoscopy  Facilitating devices/methods: intubating stylet  Endotracheal tube insertion site: oral  Blade type: Barba.  Blade size: #4  ETT size (mm): 7.5  Cormack-Lehane Classification: grade I - full view of glottis  Placement verified by: capnometry   Measured from: lips  Number of attempts at approach: 1  Number of other approaches attempted: 0    Additional Comments  Difficult mask requiring 2 providers holding mask in place with oral airway. Easy intubation Barba 4 blade grade I view

## 2024-05-06 NOTE — DISCHARGE INSTRUCTIONS
General Anesthesia Discharge Instructions    About this topic  You may need general anesthesia if you need to be asleep during a procedure. Your doctor will use drugs to block the signals that go from your nerves to your brain. Doctors give general anesthesia during a surgery or procedure to:  Allow you to sleep  Help your body be still  Relax your muscles  Help you to relax and be pain free  Keep you from remembering the surgery  Let the doctor manage your airway, breathing, and blood flow  The doctor or nurse anesthetist gives general anesthesia by a shot into your vein. Sometimes, you may breathe in a gas through a mask placed over your face.  What care is needed at home?  Ask your doctor what you need to do when you go home. Make sure you ask questions if you do not understand what the doctor says.  Your doctor may give you drugs to prevent or treat an upset stomach from the anesthetic. Take them as ordered.  If your throat is sore, suck on ice chips or popsicles to ease throat pain.  Put 2 to 3 pillows under your head and back when you lie down to help you breathe easier.  For the first 24 to 48 hours:  Do not operate heavy or dangerous machinery.  Do not make major decisions or sign important papers. You may not be able to think clearly.  Avoid beer, wine, or mixed drinks.  You are at a higher risk of falling for at least 24 hours after general anesthesia.  Take extra care when you get up.  Do not change positions quickly.  Do not rush when you need to go to the bathroom or to answer the phone.  Ask for help if you feel unsteady when you try to walk.  Wear shoes with non-slip soles and low heels.  What follow-up care is needed?  Your doctor may ask you to come back to the office to check on your progress. Be sure to keep these visits.  If you have stitches that do not dissolve or staples, you will need to have them removed. Your doctor will want to do this in 1 to 2 weeks. If the doctor used skin glue, the  glue will fall off on its own.  What drugs may be needed?  The doctor may order drugs to:  Help with pain  Treat an upset stomach or throwing up  Will physical activity be limited?  You will not be allowed to drive right away after the procedure. Ask a family member or a friend to drive you home.  Avoid trying to get out of bed without help until you are sure of your balance.  You may have to limit your activity. Talk to your doctor about if you need to limit how much you lift or limit exercise after your procedure.  What changes to diet are needed?  Start with a light diet when you are fully awake. This includes things that are easy to swallow like soups, pudding, jello, toast, and eggs. Slowly progress to your normal diet.  What problems could happen?  Low blood pressure  Breathing problems  Upset stomach or throwing up  Dizziness  Blood clots  Infection  When do I need to call the doctor?  Trouble breathing  Upset stomach or throwing up more than 3 times in the next 2 days  Dizziness  Teach Back: Helping You Understand  The Teach Back Method helps you understand the information we are giving you. After you talk with the staff, tell them in your own words what you learned. This helps to make sure the staff has described each thing clearly. It also helps to explain things that may have been confusing. Before going home, make sure you can do these:  I can tell you about my procedure.  I can tell you if I need to follow up with my doctor.  I can tell you what is good for me to eat and drink the next day.  I can tell you what I would do if I have trouble breathing, an upset stomach, or dizziness.  Where can I learn more?  [employer] [place] [employer]  https://www.nigms.nih.gov/education/pages/factsheet_Anesthesia.aspx  NHS Choices  http://www.nhs.uk/conditions/Anaesthetic-general/Pages/Definition.aspx  Last Reviewed Date  2020-04-22    Abdominal Hernia Repair, Laparoscopic Surgery    Why is this  procedure done?  The belly wall covers the center of your body. It keeps your stomach and other body organs in place. Sometimes, part of your belly wall may become weak. This may cause organs to bulge or swell through the weak wall and get stuck. A hernia repair surgery fixes this weakness in the wall. Then, your organs stay in place.    What will the results be?  The belly wall is fixed and the hernia will be gone. Organs are less likely to get stuck in the belly.  What happens before the procedure?  Your doctor will ask you about your health history. Talk to the doctor about:  All the drugs you are taking. Be sure to include all prescription and over-the-counter (OTC) drugs, and herbal supplements. Tell the doctor about any drug allergy. Bring a list of drugs you take with you.  Any bleeding problems. Be sure to tell your doctor if you are taking any drugs that may cause bleeding. Some of these are warfarin, rivaroxaban, apixaban, ticagrelor, clopidogrel, ketorolac, ibuprofen, naproxen, or aspirin. Certain vitamins and herbs, such as garlic and fish oil, may also add to the risk for bleeding. You may need to stop these drugs as well. Talk to your doctor about them.  If you need to stop eating or drinking before your procedure.  Your doctor will do an exam and may order:  Lab tests  ECG  CAT scan  You will not be allowed to drive right away after the procedure. Ask a family member or a friend to drive you home.  Take a bath before the procedure. You may be asked to use special soap or wipes to wash your belly. This will help to prevent infection.  What happens during the procedure?  Once you are in the operating room, the staff will put an IV in your arm to give you fluids and drugs. You will be given a drug to make you sleepy. It will also help you stay pain free during the surgery.  This surgery can be done a few ways:  Laparoscopic Surgery  Your doctor will make 3 to 4 small cuts in your belly. The doctor will  put a scope with a tiny camera in one of the small cuts to look at the hernia. Your doctor will put small surgical tools into the holes to do the procedure.  Robotic Surgery  Your doctor will make 3 to 4 small cuts in your belly.  A scope with a tiny camera is put through one of the small cuts. Your doctor will put small surgical tools into other holes to do the procedure.  All the tools will be joined to one of the robotic arms. This will let the robot grasp, cut, dissect, and sew.  Your doctor will sit at a computer while viewing the pictures on the screen. Your doctor will use a joystick-like hand control and a foot pedal to help guide the tools.  Another doctor will stay next to you to move the tools if needed.  Your doctor will guide the robot to do the surgery.  The doctor moves the organs back into place. The muscles around the hernia will be sewn back into place. This will fix the hole or weakened part. If the hernia is large your doctor may use mesh to repair the hole. This may help keep the hernia from happening again.  The doctor puts gas into your belly to see the hernia and the organs in the belly.  Your doctor will close your cuts and cover them with clean bandages.  Sometimes the doctor puts numbing drugs into your belly after the surgery so your pain is less.  The procedure most often takes less than 2 hours.  What happens after the procedure?  You will go to the Recovery Room after the surgery. The staff will watch you closely. Your doctor may give you drugs for pain.  You can usually go home after your surgery. In some cases, you may need to stay in the hospital overnight.  What lifestyle changes are needed?  Eat foods high in fiber and keep a healthy weight.  Learn an exercise program to strengthen the muscles on the belly area. This may lower the chance of your hernia coming back.  Avoid heavy lifting after your surgery so that the hernia does not come back.  What drugs may be needed?  The doctor  may order drugs to:  Help with pain  Fight an infection  Soften stools  What problems could happen?  Infection  Bleeding  Scarring  Bowel blockage  Pneumonia  Blood clots in the legs  Where can I learn more?  American [employer]  https://familydoctor.org/condition/hernia/  American College of [employer]geons  https://www.facs.org/-/media/files/education/patient-ed/groin_hernia.ashx  NHS Choices  https://www.nhs.uk/conditions/hernia  Last Reviewed Date  2021-09-28

## 2024-05-22 ENCOUNTER — OFFICE VISIT (OUTPATIENT)
Dept: ORTHOPEDIC SURGERY | Facility: CLINIC | Age: 59
End: 2024-05-22
Payer: COMMERCIAL

## 2024-05-22 ENCOUNTER — APPOINTMENT (OUTPATIENT)
Dept: RADIOLOGY | Facility: HOSPITAL | Age: 59
End: 2024-05-22
Payer: COMMERCIAL

## 2024-05-22 ENCOUNTER — HOSPITAL ENCOUNTER (EMERGENCY)
Facility: HOSPITAL | Age: 59
Discharge: HOME | End: 2024-05-22
Payer: COMMERCIAL

## 2024-05-22 VITALS
SYSTOLIC BLOOD PRESSURE: 140 MMHG | HEIGHT: 68 IN | HEART RATE: 93 BPM | TEMPERATURE: 98.1 F | BODY MASS INDEX: 35.16 KG/M2 | WEIGHT: 232 LBS | DIASTOLIC BLOOD PRESSURE: 70 MMHG | OXYGEN SATURATION: 97 % | RESPIRATION RATE: 18 BRPM

## 2024-05-22 VITALS — HEIGHT: 68 IN | BODY MASS INDEX: 35.16 KG/M2 | WEIGHT: 232 LBS

## 2024-05-22 DIAGNOSIS — S82.831A CLOSED FRACTURE OF DISTAL END OF RIGHT FIBULA, UNSPECIFIED FRACTURE MORPHOLOGY, INITIAL ENCOUNTER: Primary | ICD-10-CM

## 2024-05-22 DIAGNOSIS — S82.891A CLOSED FRACTURE OF RIGHT ANKLE, INITIAL ENCOUNTER: Primary | ICD-10-CM

## 2024-05-22 PROCEDURE — 99283 EMERGENCY DEPT VISIT LOW MDM: CPT | Mod: 25

## 2024-05-22 PROCEDURE — 99214 OFFICE O/P EST MOD 30 MIN: CPT | Performed by: INTERNAL MEDICINE

## 2024-05-22 PROCEDURE — 73610 X-RAY EXAM OF ANKLE: CPT | Mod: RIGHT SIDE | Performed by: RADIOLOGY

## 2024-05-22 PROCEDURE — 73610 X-RAY EXAM OF ANKLE: CPT | Mod: RT

## 2024-05-22 PROCEDURE — 99213 OFFICE O/P EST LOW 20 MIN: CPT | Mod: 57 | Performed by: INTERNAL MEDICINE

## 2024-05-22 PROCEDURE — 27786 TREATMENT OF ANKLE FRACTURE: CPT | Performed by: INTERNAL MEDICINE

## 2024-05-22 PROCEDURE — 3008F BODY MASS INDEX DOCD: CPT | Performed by: INTERNAL MEDICINE

## 2024-05-22 RX ORDER — NAPROXEN 500 MG/1
500 TABLET ORAL
Qty: 30 TABLET | Refills: 0 | Status: SHIPPED | OUTPATIENT
Start: 2024-05-22 | End: 2024-06-06

## 2024-05-22 RX ORDER — OXYCODONE AND ACETAMINOPHEN 5; 325 MG/1; MG/1
1 TABLET ORAL EVERY 6 HOURS PRN
Qty: 12 TABLET | Refills: 0 | Status: SHIPPED | OUTPATIENT
Start: 2024-05-22

## 2024-05-22 ASSESSMENT — PAIN - FUNCTIONAL ASSESSMENT: PAIN_FUNCTIONAL_ASSESSMENT: 0-10

## 2024-05-22 ASSESSMENT — LIFESTYLE VARIABLES
HAVE YOU EVER FELT YOU SHOULD CUT DOWN ON YOUR DRINKING: NO
EVER FELT BAD OR GUILTY ABOUT YOUR DRINKING: NO
EVER HAD A DRINK FIRST THING IN THE MORNING TO STEADY YOUR NERVES TO GET RID OF A HANGOVER: NO
HAVE PEOPLE ANNOYED YOU BY CRITICIZING YOUR DRINKING: NO
TOTAL SCORE: 0

## 2024-05-22 ASSESSMENT — PAIN SCALES - GENERAL: PAINLEVEL_OUTOF10: 7

## 2024-05-22 ASSESSMENT — PAIN DESCRIPTION - DESCRIPTORS: DESCRIPTORS: ACHING

## 2024-05-22 ASSESSMENT — PAIN DESCRIPTION - ONSET: ONSET: SUDDEN

## 2024-05-22 ASSESSMENT — PAIN DESCRIPTION - LOCATION: LOCATION: ANKLE

## 2024-05-22 ASSESSMENT — COLUMBIA-SUICIDE SEVERITY RATING SCALE - C-SSRS
1. IN THE PAST MONTH, HAVE YOU WISHED YOU WERE DEAD OR WISHED YOU COULD GO TO SLEEP AND NOT WAKE UP?: NO
2. HAVE YOU ACTUALLY HAD ANY THOUGHTS OF KILLING YOURSELF?: NO
6. HAVE YOU EVER DONE ANYTHING, STARTED TO DO ANYTHING, OR PREPARED TO DO ANYTHING TO END YOUR LIFE?: NO

## 2024-05-22 ASSESSMENT — PAIN DESCRIPTION - PAIN TYPE: TYPE: ACUTE PAIN

## 2024-05-22 ASSESSMENT — PAIN DESCRIPTION - ORIENTATION: ORIENTATION: RIGHT

## 2024-05-22 ASSESSMENT — PAIN DESCRIPTION - FREQUENCY: FREQUENCY: CONSTANT/CONTINUOUS

## 2024-05-22 NOTE — ED PROVIDER NOTES
"HPI   Chief Complaint   Patient presents with    Ankle Pain     Pt was walking down the steps and fell. 3 steps. Pt denies hitting his head. Pt denies blood thinners. Pt c/o right ankle pain. \"I heard it crack\". + swelling. Pt unable to stand on it.       59-year-old male presents to the ER with complaints of right ankle pain.  He states that he believes he rolled his ankle while walking down the steps which resulted in him falling down 3 steps.  This happened around 11:00 this morning.  He reports hearing a snap.  There is no obvious deformity.  Limited range of motion with plantar and dorsi flexion due to pain. He denies any other injuries and states that he more so slid down the stairs.  He denies any numbness and tingling in his foot. Denies hitting his head or loss of consciousness. He reports taking an ibuprofen prior to arrival and that his pain is well-controlled right now.       History provided by:  Patient   used: No                        Lilian Coma Scale Score: 15                     Patient History   Past Medical History:   Diagnosis Date    Anxiety     Arthritis     COPD (chronic obstructive pulmonary disease) (Multi)     Depression     History of angina     History of colon polyps 06/06/2019    Hyperlipidemia     Hypertension     Nephrolithiasis     Other allergic rhinitis 02/18/2020    Perennial allergic rhinitis    Plantar fascial fibromatosis 03/06/2020    Plantar fasciitis of left foot    Seasonal allergies     Ventral hernia without obstruction or gangrene 04/06/2021    Ventral hernia without obstruction or gangrene    Wears glasses      Past Surgical History:   Procedure Laterality Date    CARDIAC CATHETERIZATION      CARDIAC CATHETERIZATION N/A 03/22/2024    Procedure: Left Heart Cath, With LV;  Surgeon: Cristhian Clements MD;  Location: [facility];  Service: Cardiovascular;  Laterality: N/A;  Metoprolol tartrate 25 mg and Ranexa 500 mg on admit  If cath " is negative, refer to Pulmonary    COLONOSCOPY      HERNIA REPAIR      KIDNEY STONE SURGERY      TONSILLECTOMY      TOTAL KNEE ARTHROPLASTY Left     TKR     Family History   Problem Relation Name Age of Onset    Diabetes type II Mother      Other (cardiac disorder) Father      Hypertension Father       Social History     Tobacco Use    Smoking status: Former     Current packs/day: 0.00     Types: Cigarettes     Quit date:      Years since quittin.4    Smokeless tobacco: Never   Vaping Use    Vaping status: Never Used   Substance Use Topics    Alcohol use: Never    Drug use: Never       Physical Exam   ED Triage Vitals [24 1147]   Temperature Heart Rate Respirations BP   36.7 °C (98.1 °F) 93 18 140/70      Pulse Ox Temp Source Heart Rate Source Patient Position   97 % Tympanic Monitor Sitting      BP Location FiO2 (%)     Left arm --       Physical Exam  Gen.: Vitals noted. No distress. Afebrile.   Cardiac: Regular rate rhythm. No murmur.   Pulmonary: Equal breath sounds bilaterally. No adventitious breath sounds.   Back: Nontender throughout.   Lower extremity: There is no tenderness over the medial malleolus.  Tenderness over the lateral malleolus as well as significant swelling. There is no tenderness over the anterior ankle mortise. The foot is nontender. The skin is intact. Is neurovascularly intact distally.  No tenderness over the knee or fibular head.  Patient is able to move toes.  Limited range of motion with plantar and dorsi flexion.     ED Course & MDM   Diagnoses as of 24 1353   Closed fracture of right ankle, initial encounter   Labs Reviewed - No data to display     XR ankle right 3+ views   Final Result   Spiral fracture distal fibula as above.        MACRO:   none        Signed by: Khoa Marroquin 2024 1:23 PM   Dictation workstation:   [id number]            Medical Decision Making  Patient declined any medications for pain.    X-ray imaging did confirm a spiral fracture of  the distal fibula.    Discussed results with the patient, discussed immobilization, a posterior with ankle stirrup was placed by nursing staff, I did evaluate the splint, MSPs intact post splint application.    Discussed nonweightbearing status, discharged with crutches.  Discussed ice and elevation, pain control.    Patient will present directly from here to the Ortho clinic.  Continue close monitor symptoms, call with any worsening symptoms or any additional concerns.    Procedure  Procedures     Lizeth Broderick, NEYMAR-CNP  05/22/24 1348

## 2024-05-22 NOTE — PROGRESS NOTES
Acute Injury New Patient Visit    CC:   Chief Complaint   Patient presents with    Right Ankle - Pain     Rt Ankle, Lateral Pain - X-Rays @ [facility]       HPI: Charlie is a 59 y.o. male presents today for evaluation for acute right ankle injury sustained earlier today after he fell down stairs this morning. He went to the ER where x-rays were taken and was informed that he has a fracture. He is here for initial evaluation.        Review of Systems   GENERAL: Negative for malaise, significant weight loss, fever  MUSCULOSKELETAL: See HPI  NEURO:  Negative for numbness / tingling     Past Medical History  Past Medical History:   Diagnosis Date    Anxiety     Arthritis     COPD (chronic obstructive pulmonary disease) (Multi)     Depression     History of angina     History of colon polyps 06/06/2019    Hyperlipidemia     Hypertension     Nephrolithiasis     Other allergic rhinitis 02/18/2020    Perennial allergic rhinitis    Plantar fascial fibromatosis 03/06/2020    Plantar fasciitis of left foot    Seasonal allergies     Ventral hernia without obstruction or gangrene 04/06/2021    Ventral hernia without obstruction or gangrene    Wears glasses        Medication review  Medication Documentation Review Audit       Reviewed by Ricardo Wilkins (Technologist) on 05/22/24 at 1445      Medication Order Taking? Sig Documenting Provider Last Dose Status   albuterol (ProAir HFA) 90 mcg/actuation inhaler [id number] No Inhale 2 puffs every 4 hours if needed for wheezing or shortness of breath. Jefferson Hirsch MD 5/5/2024 Active   aspirin 81 mg chewable tablet [id number] No Chew 1 tablet (81 mg) once daily.   Patient not taking: Reported on 4/24/2024    Cristhian Clements MD More than a month Active   celecoxib (CeleBREX) 200 mg capsule [id number] No Take 1 capsule (200 mg) by mouth once daily as needed for mild pain (1 - 3). Jefferson Hirsch MD Past Month Active   fenofibrate (Triglide) 160 mg tablet [id number] No Take 1  tablet (160 mg) by mouth once every day. Jefferson Hirsch MD 2024 Active   fluticasone (Flonase) 50 mcg/actuation nasal spray [id number] No Administer 1 spray into each nostril once daily. Shake gently. Before first use, prime pump. After use, clean tip and replace cap. Jefferson Hirsch MD 2024 Active   fluticasone furoate-vilanteroL (Breo Ellipta) 100-25 mcg/dose inhaler [id number] No Inhale 1 puff once daily. Jefferson Hirsch MD Past Week  24 9279   gabapentin (Neurontin) 300 mg capsule [id number]  Take 1 capsule (300 mg) by mouth 3 times a day as needed (mild pain) for up to 20 doses. Les AUSTIN MD  Active   hydroCHLOROthiazide (Microzide) 12.5 mg capsule [id number] No Take 1 capsule (12.5 mg) by mouth once every day. Jefferson Hirsch MD 2024 Active   losartan (Cozaar) 25 mg tablet [id number] No Take 1 tablet (25 mg) by mouth once every day. Jefferson Hirsch MD 2024 Active   naproxen (Naprosyn) 500 mg tablet [id number]  Take 1 tablet (500 mg) by mouth 2 times daily (morning and late afternoon) for 15 days. NEYMAR Ocasio-CNP  Active   oxyCODONE-acetaminophen (Percocet) 5-325 mg tablet [id number]  Take 1 tablet by mouth every 6 hours if needed for severe pain (7 - 10). NEYMAR Ocasio-CNP  Active   rosuvastatin (Crestor) 10 mg tablet [id number] No Take 1 tablet (10 mg) by mouth once daily. Cristhian Clements MD 2024 Active                    Allergies  Allergies   Allergen Reactions    Codeine Nausea And Vomiting, Unknown and Other       Social History  Social History     Socioeconomic History    Marital status:      Spouse name: Not on file    Number of children: Not on file    Years of education: Not on file    Highest education level: Not on file   Occupational History    Not on file   Tobacco Use    Smoking status: Former     Current packs/day: 0.00     Types: Cigarettes     Quit date:      Years since quittin.4    Smokeless  tobacco: Never   Vaping Use    Vaping status: Never Used   Substance and Sexual Activity    Alcohol use: Never    Drug use: Never    Sexual activity: Defer   Other Topics Concern    Not on file   Social History Narrative    Not on file     Social Determinants of Health     Financial Resource Strain: Low Risk  (1/4/2024)    Overall Financial Resource Strain (CARDIA)     Difficulty of Paying Living Expenses: Not hard at all   Food Insecurity: Not on file   Transportation Needs: No Transportation Needs (1/4/2024)    PRAPARE - Transportation     Lack of Transportation (Medical): No     Lack of Transportation (Non-Medical): No   Physical Activity: Inactive (6/6/2019)    Received from [facility]    Exercise Vital Sign     Days of Exercise per Week: 0 days     Minutes of Exercise per Session: 0 min   Stress: No Stress Concern Present (6/6/2019)    Received from [facility]    [employer] - Occupational Stress Questionnaire     Feeling of Stress : Not at all   Social Connections: Not on file   Intimate Partner Violence: Not on file   Housing Stability: Low Risk  (1/4/2024)    Housing Stability Vital Sign     Unable to Pay for Housing in the Last Year: No     Number of Places Lived in the Last Year: 1     Unstable Housing in the Last Year: No       Surgical History  Past Surgical History:   Procedure Laterality Date    CARDIAC CATHETERIZATION      CARDIAC CATHETERIZATION N/A 03/22/2024    Procedure: Left Heart Cath, With LV;  Surgeon: Cristhian Clements MD;  Location: [facility];  Service: Cardiovascular;  Laterality: N/A;  Metoprolol tartrate 25 mg and Ranexa 500 mg on admit  If cath is negative, refer to Pulmonary    COLONOSCOPY      HERNIA REPAIR      KIDNEY STONE SURGERY      TONSILLECTOMY      TOTAL KNEE ARTHROPLASTY Left     TKR       Physical Exam:  GENERAL:  Patient is awake, alert, and oriented to person place and time.   Patient appears well nourished and well kept.  Affect Calm, Not Acutely Distressed.  HEENT:  Normocephalic, Atraumatic, EOMI  CARDIOVASCULAR:  Hemodynamically stable.  RESPIRATORY:  Normal respirations with unlabored breathing.  Extremity: Right ankle shows skin is intact.  There is no erythema or warmth.  Obvious swelling localized on the lateral aspect.  There is no clinical signs of infection.  There is significant pain over the lateral malleolus and distal fibula.  There is no pain of the medial malleolus.  There is no pain over the ATF, CF or PTF ligament.  There is no pain over the deltoid ligament.  No pain over the Achilles tendon.  Negative Aparicio's test.  Negative squeeze test.  Negative anterior drawer test.  Negative talar tilt test.  No pain over the anterior process of the talus.  There is no pain over the talar dome.  There is no pain at the base of the fifth metatarsal bone.  No pain of the calcaneus.  No pain over the plantar aponeurosis.  No pain of the midfoot.  Neurovascularly intact.      Diagnostics: X-rays reviewed  XR ankle right 3+ views  Narrative: Interpreted By:  Khoa Marroquin,   STUDY:  XR ANKLE RIGHT 3+ VIEWS; 5/22/2024 1:13 pm      INDICATION:  Signs/Symptoms:lateral pain. Status post fall down 3 steps.      COMPARISON:  None.      ACCESSION NUMBER(S):  [id number]      ORDERING CLINICIAN:  BHASKAR MENENDEZ      TECHNIQUE:  Right ankle three views, portable      FINDINGS:  There is a spiral fracture of the distal fibular metaphysis with  nearly anatomic alignment of the anterior and oblique projections. On  the lateral view there is approximately 3 mm of diastasis at the  fracture site. The ankle mortise is symmetric.      Impression: Spiral fracture distal fibula as above.      MACRO:  none      Signed by: Khoa Marroquin 5/22/2024 1:23 PM  Dictation workstation:   [id number]      Procedure: None    Assessment: Acute right nondisplaced distal fibula fracture    Plan: Charlie lopez  today for initial evaluation for acute right distal fibula fracture sustained after a fall today. We recommended non surgical treatment by placing him into a fracture boot due to the acute nature of the injury we will avoid putting him into a cast. He will follow-up next week, repeat x-rays of the right ankle, 3 views, AP, lateral, and oblique views.  If he is  clinically doing well and stable appearing fracture, we may consider continue fracture boot or short leg walking cast.    No orders of the defined types were placed in this encounter.     At the conclusion of the visit there were no further questions by the patient/family regarding their plan of care.  Patient was instructed to call or return with any issues, questions, or concerns regarding their injury and/or treatment plan described above.     05/22/24 at 3:03 PM - Anyi Barragan MD  Scribe Attestation  By signing my name below, I Ernie Pearsonchristian, Scribe   attest that this documentation has been prepared under the direction and in the presence of Anyi Barragan MD.    Office: [phone]    This note was prepared using voice recognition software.  The details of this note are correct and have been reviewed, and corrected to the best of my ability.  Some grammatical errors may persist related to the Dragon software.

## 2024-05-23 ENCOUNTER — APPOINTMENT (OUTPATIENT)
Dept: SURGERY | Facility: CLINIC | Age: 59
End: 2024-05-23
Payer: COMMERCIAL

## 2024-05-27 ENCOUNTER — TELEPHONE (OUTPATIENT)
Dept: ORTHOPEDIC SURGERY | Facility: CLINIC | Age: 59
End: 2024-05-27
Payer: COMMERCIAL

## 2024-05-28 ENCOUNTER — HOSPITAL ENCOUNTER (OUTPATIENT)
Dept: RADIOLOGY | Facility: HOSPITAL | Age: 59
Discharge: HOME | End: 2024-05-28
Payer: COMMERCIAL

## 2024-05-28 ENCOUNTER — OFFICE VISIT (OUTPATIENT)
Dept: ORTHOPEDIC SURGERY | Facility: CLINIC | Age: 59
End: 2024-05-28
Payer: COMMERCIAL

## 2024-05-28 DIAGNOSIS — S82.831A CLOSED FRACTURE OF DISTAL END OF RIGHT FIBULA, UNSPECIFIED FRACTURE MORPHOLOGY, INITIAL ENCOUNTER: ICD-10-CM

## 2024-05-28 DIAGNOSIS — S82.831A CLOSED FRACTURE OF DISTAL END OF RIGHT FIBULA, UNSPECIFIED FRACTURE MORPHOLOGY, INITIAL ENCOUNTER: Primary | ICD-10-CM

## 2024-05-28 PROCEDURE — 73610 X-RAY EXAM OF ANKLE: CPT | Mod: RT

## 2024-05-28 PROCEDURE — 3008F BODY MASS INDEX DOCD: CPT | Performed by: INTERNAL MEDICINE

## 2024-05-28 PROCEDURE — 99024 POSTOP FOLLOW-UP VISIT: CPT | Performed by: INTERNAL MEDICINE

## 2024-05-28 PROCEDURE — 73610 X-RAY EXAM OF ANKLE: CPT | Mod: RIGHT SIDE | Performed by: STUDENT IN AN ORGANIZED HEALTH CARE EDUCATION/TRAINING PROGRAM

## 2024-05-28 NOTE — PROGRESS NOTES
CC:   Chief Complaint   Patient presents with    Right Ankle - Follow-up     Closed fracture of distal end of right fibula  Repeat xrays today       HPI: Charlie is a 59 y.o. male presents today for reevaluation for fracture of the distal end of the right fibula. He states that he is doing better, he is able to weight bear on the right. He notes some pain and swelling. He is here for repeat x-rays.        Review of Systems   GENERAL: Negative for malaise, significant weight loss, fever  MUSCULOSKELETAL: See HPI  NEURO:  Negative for numbness / tingling     Past Medical History  Past Medical History:   Diagnosis Date    Anxiety     Arthritis     COPD (chronic obstructive pulmonary disease) (Multi)     Depression     History of angina     History of colon polyps 06/06/2019    Hyperlipidemia     Hypertension     Nephrolithiasis     Other allergic rhinitis 02/18/2020    Perennial allergic rhinitis    Plantar fascial fibromatosis 03/06/2020    Plantar fasciitis of left foot    Seasonal allergies     Ventral hernia without obstruction or gangrene 04/06/2021    Ventral hernia without obstruction or gangrene    Wears glasses        Medication review  Medication Documentation Review Audit       Reviewed by Ricardo Wilkins (Technologist) on 05/22/24 at 1445      Medication Order Taking? Sig Documenting Provider Last Dose Status   albuterol (ProAir HFA) 90 mcg/actuation inhaler [id number] No Inhale 2 puffs every 4 hours if needed for wheezing or shortness of breath. Jefferson Hirsch MD 5/5/2024 Active   aspirin 81 mg chewable tablet [id number] No Chew 1 tablet (81 mg) once daily.   Patient not taking: Reported on 4/24/2024    Cristhian Clements MD More than a month Active   celecoxib (CeleBREX) 200 mg capsule [id number] No Take 1 capsule (200 mg) by mouth once daily as needed for mild pain (1 - 3). Jefferson Hirsch MD Past Month Active   fenofibrate (Triglide) 160 mg tablet [id number] No Take 1 tablet (160 mg) by  mouth once every day. Jefferson Hirsch MD 2024 Active   fluticasone (Flonase) 50 mcg/actuation nasal spray [id number] No Administer 1 spray into each nostril once daily. Shake gently. Before first use, prime pump. After use, clean tip and replace cap. Jefferson Hirsch MD 2024 Active   fluticasone furoate-vilanteroL (Breo Ellipta) 100-25 mcg/dose inhaler [id number] No Inhale 1 puff once daily. Jefferson Hirsch MD Past Week  24 3482   gabapentin (Neurontin) 300 mg capsule [id number]  Take 1 capsule (300 mg) by mouth 3 times a day as needed (mild pain) for up to 20 doses. Les AUSTIN MD  Active   hydroCHLOROthiazide (Microzide) 12.5 mg capsule [id number] No Take 1 capsule (12.5 mg) by mouth once every day. Jefferson Hirsch MD 2024 Active   losartan (Cozaar) 25 mg tablet [id number] No Take 1 tablet (25 mg) by mouth once every day. Jefferson Hirsch MD 2024 Active   naproxen (Naprosyn) 500 mg tablet [id number]  Take 1 tablet (500 mg) by mouth 2 times daily (morning and late afternoon) for 15 days. NEYMAR Ocasio-CNP  Active   oxyCODONE-acetaminophen (Percocet) 5-325 mg tablet [id number]  Take 1 tablet by mouth every 6 hours if needed for severe pain (7 - 10). NEYMAR Ocasio-CNP  Active   rosuvastatin (Crestor) 10 mg tablet [id number] No Take 1 tablet (10 mg) by mouth once daily. Cristhian Clements MD 2024 Active                    Allergies  Allergies   Allergen Reactions    Codeine Nausea And Vomiting, Unknown and Other       Social History  Social History     Socioeconomic History    Marital status:      Spouse name: Not on file    Number of children: Not on file    Years of education: Not on file    Highest education level: Not on file   Occupational History    Not on file   Tobacco Use    Smoking status: Former     Current packs/day: 0.00     Types: Cigarettes     Quit date:      Years since quittin.4    Smokeless tobacco: Never   Vaping  Use    Vaping status: Never Used   Substance and Sexual Activity    Alcohol use: Never    Drug use: Never    Sexual activity: Defer   Other Topics Concern    Not on file   Social History Narrative    Not on file     Social Determinants of Health     Financial Resource Strain: Low Risk  (1/4/2024)    Overall Financial Resource Strain (CARDIA)     Difficulty of Paying Living Expenses: Not hard at all   Food Insecurity: Not on file   Transportation Needs: No Transportation Needs (1/4/2024)    PRAPARE - Transportation     Lack of Transportation (Medical): No     Lack of Transportation (Non-Medical): No   Physical Activity: Inactive (6/6/2019)    Received from [facility]    Exercise Vital Sign     Days of Exercise per Week: 0 days     Minutes of Exercise per Session: 0 min   Stress: No Stress Concern Present (6/6/2019)    Received from [facility]    [employer] [place] [employer] - Occupational Stress Questionnaire     Feeling of Stress : Not at all   Social Connections: Not on file   Intimate Partner Violence: Not on file   Housing Stability: Low Risk  (1/4/2024)    Housing Stability Vital Sign     Unable to Pay for Housing in the Last Year: No     Number of Places Lived in the Last Year: 1     Unstable Housing in the Last Year: No       Surgical History  Past Surgical History:   Procedure Laterality Date    CARDIAC CATHETERIZATION      CARDIAC CATHETERIZATION N/A 03/22/2024    Procedure: Left Heart Cath, With LV;  Surgeon: Cristhian Clements MD;  Location: [facility];  Service: Cardiovascular;  Laterality: N/A;  Metoprolol tartrate 25 mg and Ranexa 500 mg on admit  If cath is negative, refer to Pulmonary    COLONOSCOPY      HERNIA REPAIR      KIDNEY STONE SURGERY      TONSILLECTOMY      TOTAL KNEE ARTHROPLASTY Left     TKR       Physical Exam:  GENERAL:  Patient is awake, alert, and oriented to person place and time.  Patient appears well nourished  and well kept.  Affect Calm, Not Acutely Distressed.  HEENT:  Normocephalic, Atraumatic, EOMI  CARDIOVASCULAR:  Hemodynamically stable.  RESPIRATORY:  Normal respirations with unlabored breathing.  Extremity:  Right ankle shows skin is intact.  There is no erythema or warmth.  Improved swelling localized on the lateral aspect.  There is no clinical signs of infection.  There is  pain over the lateral malleolus and distal fibula.  There is no pain of the medial malleolus.  There is no pain over the ATF, CF or PTF ligament.  There is no pain over the deltoid ligament.  No pain over the Achilles tendon.  Negative Aparicio's test.  Negative squeeze test.  Negative anterior drawer test.  Negative talar tilt test.  No pain over the anterior process of the talus.  There is no pain over the talar dome.  There is no pain at the base of the fifth metatarsal bone.  No pain of the calcaneus.  No pain over the plantar aponeurosis.  No pain of the midfoot.  Neurovascularly intact.       Diagnostics: X-rays reviewed  XR ankle right 3+ views  Narrative: Interpreted By:  Khoa Marroquin,   STUDY:  XR ANKLE RIGHT 3+ VIEWS; 5/22/2024 1:13 pm      INDICATION:  Signs/Symptoms:lateral pain. Status post fall down 3 steps.      COMPARISON:  None.      ACCESSION NUMBER(S):  [id number]      ORDERING CLINICIAN:  BHASKAR MENENDEZ      TECHNIQUE:  Right ankle three views, portable      FINDINGS:  There is a spiral fracture of the distal fibular metaphysis with  nearly anatomic alignment of the anterior and oblique projections. On  the lateral view there is approximately 3 mm of diastasis at the  fracture site. The ankle mortise is symmetric.      Impression: Spiral fracture distal fibula as above.      MACRO:  none      Signed by: Khoa Marroquin 5/22/2024 1:23 PM  Dictation workstation:   [id number]        Procedure: None    Assessment:  Acute right nondisplaced distal fibula fracture     Plan: Charlie  presents today for reevaluation for acute  right distal fibula fracture. He is clinically getting better. X-rays showed a stable appearing fracture with no further displacement, mortise is intact.  We did recommend to place into a short leg cast, but he is requesting to stay in the fracture boot.  He will continue with the fracture boot and will follow-up in two weeks, repeat x-rays of the right ankle, 3 views, AP, lateral, and oblique views.     Orders Placed This Encounter    XR ankle right 3+ views      At the conclusion of the visit there were no further questions by the patient/family regarding their plan of care.  Patient was instructed to call or return with any issues, questions, or concerns regarding their injury and/or treatment plan described above.     05/28/24 at 10:13 AM - Anyi Barragan MD  Scribe Attestation  By signing my name below, I, Ernie Pisanomo, Scribe   attest that this documentation has been prepared under the direction and in the presence of Anyi Barragan MD.    Office: [phone]    This note was prepared using voice recognition software.  The details of this note are correct and have been reviewed, and corrected to the best of my ability.  Some grammatical errors may persist related to the Dragon software.

## 2024-05-30 ENCOUNTER — OFFICE VISIT (OUTPATIENT)
Dept: SURGERY | Facility: CLINIC | Age: 59
End: 2024-05-30
Payer: COMMERCIAL

## 2024-05-30 VITALS — WEIGHT: 231 LBS | HEIGHT: 68 IN | BODY MASS INDEX: 35.01 KG/M2

## 2024-05-30 DIAGNOSIS — K43.9 VENTRAL HERNIA WITHOUT OBSTRUCTION OR GANGRENE: Primary | ICD-10-CM

## 2024-05-30 PROCEDURE — 99024 POSTOP FOLLOW-UP VISIT: CPT | Performed by: SURGERY

## 2024-05-30 PROCEDURE — 3008F BODY MASS INDEX DOCD: CPT | Performed by: SURGERY

## 2024-05-30 PROCEDURE — 1036F TOBACCO NON-USER: CPT | Performed by: SURGERY

## 2024-05-30 NOTE — PROGRESS NOTES
Patient status post laparoscopic repair of recurrent ventral hernia on 5/6/2024 patient had a fall unrelated to this after recovering from that    Denies any abdominal complaints    Incision healed no recurrence palpable minimal seroma    Follow-up as needed resume work at previous date specified

## 2024-06-11 ENCOUNTER — HOSPITAL ENCOUNTER (OUTPATIENT)
Dept: RADIOLOGY | Facility: HOSPITAL | Age: 59
Discharge: HOME | End: 2024-06-11
Payer: COMMERCIAL

## 2024-06-11 ENCOUNTER — OFFICE VISIT (OUTPATIENT)
Dept: ORTHOPEDIC SURGERY | Facility: CLINIC | Age: 59
End: 2024-06-11
Payer: COMMERCIAL

## 2024-06-11 DIAGNOSIS — S82.831A CLOSED FRACTURE OF DISTAL END OF RIGHT FIBULA, UNSPECIFIED FRACTURE MORPHOLOGY, INITIAL ENCOUNTER: ICD-10-CM

## 2024-06-11 DIAGNOSIS — S82.831A CLOSED FRACTURE OF DISTAL END OF RIGHT FIBULA, UNSPECIFIED FRACTURE MORPHOLOGY, INITIAL ENCOUNTER: Primary | ICD-10-CM

## 2024-06-11 PROCEDURE — 3008F BODY MASS INDEX DOCD: CPT | Performed by: INTERNAL MEDICINE

## 2024-06-11 PROCEDURE — 99024 POSTOP FOLLOW-UP VISIT: CPT | Performed by: INTERNAL MEDICINE

## 2024-06-11 PROCEDURE — 73610 X-RAY EXAM OF ANKLE: CPT | Mod: RIGHT SIDE | Performed by: RADIOLOGY

## 2024-06-11 PROCEDURE — 73610 X-RAY EXAM OF ANKLE: CPT | Mod: RT

## 2024-06-11 NOTE — LETTER
June 11, 2024     Patient: Charlie Massey   YOB: 1965   Date of Visit: 6/11/2024       To Whom It May Concern:    It is my medical opinion that Charlie Massey may return to work on 7/8/2024 with no restrictions .    If you have any questions or concerns, please don't hesitate to call.         Sincerely,        Anyi Barragan MD

## 2024-06-11 NOTE — PROGRESS NOTES
CC:   No chief complaint on file.      HPI: Charlie is a 59 y.o. male presents today for reevaluation for right nondisplaced distal fibula fracture. He states that he is doing well, no pain currently. Repeat x-rays today.  He is 3 weeks out from his injury.          Review of Systems   GENERAL: Negative for malaise, significant weight loss, fever  MUSCULOSKELETAL: See HPI  NEURO:  Negative for numbness / tingling     Past Medical History  Past Medical History:   Diagnosis Date    Anxiety     Arthritis     COPD (chronic obstructive pulmonary disease) (Multi)     Depression     History of angina     History of colon polyps 06/06/2019    Hyperlipidemia     Hypertension     Nephrolithiasis     Other allergic rhinitis 02/18/2020    Perennial allergic rhinitis    Plantar fascial fibromatosis 03/06/2020    Plantar fasciitis of left foot    Seasonal allergies     Ventral hernia without obstruction or gangrene 04/06/2021    Ventral hernia without obstruction or gangrene    Wears glasses        Medication review  Medication Documentation Review Audit       Reviewed by Opal Goldberg CMA (Medical Assistant) on 05/30/24 at 1132      Medication Order Taking? Sig Documenting Provider Last Dose Status   albuterol (ProAir HFA) 90 mcg/actuation inhaler [id number] Yes Inhale 2 puffs every 4 hours if needed for wheezing or shortness of breath. Jefferson Hirsch MD Taking Active   aspirin 81 mg chewable tablet [id number] No Chew 1 tablet (81 mg) once daily.   Patient not taking: Reported on 5/30/2024    Cristhian Clements MD Not Taking Active   celecoxib (CeleBREX) 200 mg capsule [id number] Yes Take 1 capsule (200 mg) by mouth once daily as needed for mild pain (1 - 3). Jefferson Hirsch MD Taking Active   fenofibrate (Triglide) 160 mg tablet [id number] Yes Take 1 tablet (160 mg) by mouth once every day. Jefferson Hirsch MD Taking Active   fluticasone (Flonase) 50 mcg/actuation nasal spray [id number] Yes Administer  1 spray into each nostril once daily. Shake gently. Before first use, prime pump. After use, clean tip and replace cap. Jefferson Hirsch MD Taking Active   fluticasone furoate-vilanteroL (Breo Ellipta) 100-25 mcg/dose inhaler [id number]  Inhale 1 puff once daily. Jefferson Hirsch MD   24 2359   gabapentin (Neurontin) 300 mg capsule [id number] No Take 1 capsule (300 mg) by mouth 3 times a day as needed (mild pain) for up to 20 doses.   Patient not taking: Reported on 2024    Les AUSTIN MD Not Taking Active   hydroCHLOROthiazide (Microzide) 12.5 mg capsule [id number] Yes Take 1 capsule (12.5 mg) by mouth once every day. Jefferson Hirsch MD Taking Active   losartan (Cozaar) 25 mg tablet [id number] Yes Take 1 tablet (25 mg) by mouth once every day. Jefferson Hirsch MD Taking Active   naproxen (Naprosyn) 500 mg tablet [id number] No Take 1 tablet (500 mg) by mouth 2 times daily (morning and late afternoon) for 15 days.   Patient not taking: Reported on 2024    DOROTHY Ocasio Not Taking Active   oxyCODONE-acetaminophen (Percocet) 5-325 mg tablet [id number] No Take 1 tablet by mouth every 6 hours if needed for severe pain (7 - 10).   Patient not taking: Reported on 2024    DOROTHY Ocasio Not Taking Active   rosuvastatin (Crestor) 10 mg tablet [id number] Yes Take 1 tablet (10 mg) by mouth once daily. Cristhian Clements MD Taking Active                    Allergies  Allergies   Allergen Reactions    Codeine Nausea And Vomiting, Unknown and Other       Social History  Social History     Socioeconomic History    Marital status:      Spouse name: Not on file    Number of children: Not on file    Years of education: Not on file    Highest education level: Not on file   Occupational History    Not on file   Tobacco Use    Smoking status: Former     Current packs/day: 0.00     Types: Cigarettes     Quit date:      Years since quittin.4    Smokeless  tobacco: Never   Vaping Use    Vaping status: Never Used   Substance and Sexual Activity    Alcohol use: Never    Drug use: Never    Sexual activity: Defer   Other Topics Concern    Not on file   Social History Narrative    Not on file     Social Determinants of Health     Financial Resource Strain: Low Risk  (1/4/2024)    Overall Financial Resource Strain (CARDIA)     Difficulty of Paying Living Expenses: Not hard at all   Food Insecurity: Not on file   Transportation Needs: No Transportation Needs (1/4/2024)    PRAPARE - Transportation     Lack of Transportation (Medical): No     Lack of Transportation (Non-Medical): No   Physical Activity: Inactive (6/6/2019)    Received from [facility]    Exercise Vital Sign     Days of Exercise per Week: 0 days     Minutes of Exercise per Session: 0 min   Stress: No Stress Concern Present (6/6/2019)    Received from [facility]    [employer] [place] [employer] - Occupational Stress Questionnaire     Feeling of Stress : Not at all   Social Connections: Not on file   Intimate Partner Violence: Not on file   Housing Stability: Low Risk  (1/4/2024)    Housing Stability Vital Sign     Unable to Pay for Housing in the Last Year: No     Number of Places Lived in the Last Year: 1     Unstable Housing in the Last Year: No       Surgical History  Past Surgical History:   Procedure Laterality Date    CARDIAC CATHETERIZATION      CARDIAC CATHETERIZATION N/A 03/22/2024    Procedure: Left Heart Cath, With LV;  Surgeon: Cristhian Clements MD;  Location: [facility];  Service: Cardiovascular;  Laterality: N/A;  Metoprolol tartrate 25 mg and Ranexa 500 mg on admit  If cath is negative, refer to Pulmonary    COLONOSCOPY      HERNIA REPAIR      KIDNEY STONE SURGERY      TONSILLECTOMY      TOTAL KNEE ARTHROPLASTY Left     TKR       Physical Exam:  GENERAL:  Patient is awake, alert, and oriented to person place and time.   Patient appears well nourished and well kept.  Affect Calm, Not Acutely Distressed.  HEENT:  Normocephalic, Atraumatic, EOMI  CARDIOVASCULAR:  Hemodynamically stable.  RESPIRATORY:  Normal respirations with unlabored breathing.  Extremity:  Right ankle shows skin is intact.  There is no erythema or warmth.  Improved swelling localized on the lateral aspect.  There is no clinical signs of infection.  There is minimal.  Pain over the lateral malleolus and distal fibula.  There is no pain of the medial malleolus.  There is no pain over the ATF, CF or PTF ligament.  There is no pain over the deltoid ligament.  No pain over the Achilles tendon.  Negative Aparicio's test.  Negative squeeze test.  Negative anterior drawer test.  Negative talar tilt test.  No pain over the anterior process of the talus.  There is no pain over the talar dome.  There is no pain at the base of the fifth metatarsal bone.  No pain of the calcaneus.  No pain over the plantar aponeurosis.  No pain of the midfoot.  Neurovascularly intact.       Diagnostics: X-rays reviewed  XR ankle right 3+ views  Narrative: Interpreted By:  Bryson Gaffney,   STUDY:  XR ANKLE RIGHT 3+ VIEWS; ;  5/28/2024 10:01 am      INDICATION:  Signs/Symptoms:pain.      COMPARISON:  05/22/2024      ACCESSION NUMBER(S):  [id number]      ORDERING CLINICIAN:  JAVIER KIMBALL      FINDINGS:  Three views of the right ankle.      Subacute distal fibular fracture with mild displacement but unchanged  alignment. Mild diffuse soft tissue swelling. Additional fractures or  dislocations.      Impression: Subacute minimally displaced distal fibular fracture.      Soft tissue swelling.      MACRO:  None      Signed by: Bryson Gaffney 5/31/2024 11:28 AM  Dictation workstation:   [id number]        Procedure: None    Assessment: Right nondisplaced distal fibula fracture     Plan: Charlie  presents today for reevaluation for right nondisplaced distal fibula fracture. He is clinically doing  well, no pain. X-rays showed a satisfactory healing fracture. He will continue with the fracture boot for 3 weeks may begin gentle passive range of motion exercises. He will follow-up in two weeks, repeat x-rays of the right ankle, 3 views, AP, lateral, and oblique views. If doing well, we may place him into a lace up ankle brace and possible physical therapy.  He has been off work since the his injury and we will plan for tentative return to work on 7/8/24.     No orders of the defined types were placed in this encounter.     At the conclusion of the visit there were no further questions by the patient/family regarding their plan of care.  Patient was instructed to call or return with any issues, questions, or concerns regarding their injury and/or treatment plan described above.     06/11/24 at 1:18 PM - Anyi Barragan MD  Scribe Attestation  By signing my name below, I, Ernie Souza, Scribe   attest that this documentation has been prepared under the direction and in the presence of Anyi Barragan MD.    Office: [phone]    This note was prepared using voice recognition software.  The details of this note are correct and have been reviewed, and corrected to the best of my ability.  Some grammatical errors may persist related to the Dragon software.

## 2024-07-02 ENCOUNTER — HOSPITAL ENCOUNTER (OUTPATIENT)
Dept: RADIOLOGY | Facility: HOSPITAL | Age: 59
Discharge: HOME | End: 2024-07-02
Payer: COMMERCIAL

## 2024-07-02 ENCOUNTER — APPOINTMENT (OUTPATIENT)
Dept: ORTHOPEDIC SURGERY | Facility: CLINIC | Age: 59
End: 2024-07-02
Payer: COMMERCIAL

## 2024-07-02 DIAGNOSIS — S82.831A CLOSED FRACTURE OF DISTAL END OF RIGHT FIBULA, UNSPECIFIED FRACTURE MORPHOLOGY, INITIAL ENCOUNTER: ICD-10-CM

## 2024-07-02 PROCEDURE — 73610 X-RAY EXAM OF ANKLE: CPT | Mod: RIGHT SIDE | Performed by: RADIOLOGY

## 2024-07-02 PROCEDURE — L1902 AFO ANKLE GAUNTLET PRE OTS: HCPCS | Performed by: INTERNAL MEDICINE

## 2024-07-02 PROCEDURE — 73610 X-RAY EXAM OF ANKLE: CPT | Mod: RT

## 2024-07-02 PROCEDURE — 3008F BODY MASS INDEX DOCD: CPT | Performed by: INTERNAL MEDICINE

## 2024-07-02 PROCEDURE — 99024 POSTOP FOLLOW-UP VISIT: CPT | Performed by: INTERNAL MEDICINE

## 2024-07-02 NOTE — PROGRESS NOTES
CC:   Chief Complaint   Patient presents with    Right Ankle - Follow-up     Right nondisplaced distal fibula fracture   Repeat xrays today       HPI: Charlie is a 59 y.o. male presents today for reevaluation for right nondisplaced distal fibula fracture. He states that he is doing well, no pain currently. He states that he has not worn his fracture boot since his last visit. Repeat x-rays today.         Review of Systems   GENERAL: Negative for malaise, significant weight loss, fever  MUSCULOSKELETAL: See HPI  NEURO:  Negative for numbness / tingling     Past Medical History  Past Medical History:   Diagnosis Date    Anxiety     Arthritis     COPD (chronic obstructive pulmonary disease) (Multi)     Depression     History of angina     History of colon polyps 06/06/2019    Hyperlipidemia     Hypertension     Nephrolithiasis     Other allergic rhinitis 02/18/2020    Perennial allergic rhinitis    Plantar fascial fibromatosis 03/06/2020    Plantar fasciitis of left foot    Seasonal allergies     Ventral hernia without obstruction or gangrene 04/06/2021    Ventral hernia without obstruction or gangrene    Wears glasses        Medication review  Medication Documentation Review Audit       Reviewed by Opal Goldberg CMA (Medical Assistant) on 05/30/24 at 1132      Medication Order Taking? Sig Documenting Provider Last Dose Status   albuterol (ProAir HFA) 90 mcg/actuation inhaler [id number] Yes Inhale 2 puffs every 4 hours if needed for wheezing or shortness of breath. Jefferson Hirsch MD Taking Active   aspirin 81 mg chewable tablet [id number] No Chew 1 tablet (81 mg) once daily.   Patient not taking: Reported on 5/30/2024    Cristhian Clements MD Not Taking Active   celecoxib (CeleBREX) 200 mg capsule [id number] Yes Take 1 capsule (200 mg) by mouth once daily as needed for mild pain (1 - 3). Jefferson Hirsch MD Taking Active   fenofibrate (Triglide) 160 mg tablet [id number] Yes Take 1 tablet (160 mg)  by mouth once every day. Jefferson Hirsch MD Taking Active   fluticasone (Flonase) 50 mcg/actuation nasal spray [id number] Yes Administer 1 spray into each nostril once daily. Shake gently. Before first use, prime pump. After use, clean tip and replace cap. Jefferson Hirsch MD Taking Active   fluticasone furoate-vilanteroL (Breo Ellipta) 100-25 mcg/dose inhaler [id number]  Inhale 1 puff once daily. Jefferson Hirsch MD   24 2359   gabapentin (Neurontin) 300 mg capsule [id number] No Take 1 capsule (300 mg) by mouth 3 times a day as needed (mild pain) for up to 20 doses.   Patient not taking: Reported on 2024    Les AUSTIN MD Not Taking Active   hydroCHLOROthiazide (Microzide) 12.5 mg capsule [id number] Yes Take 1 capsule (12.5 mg) by mouth once every day. Jefferson Hirsch MD Taking Active   losartan (Cozaar) 25 mg tablet [id number] Yes Take 1 tablet (25 mg) by mouth once every day. Jefferson Hirsch MD Taking Active   naproxen (Naprosyn) 500 mg tablet [id number] No Take 1 tablet (500 mg) by mouth 2 times daily (morning and late afternoon) for 15 days.   Patient not taking: Reported on 2024    NEYMAR Ocasio-CNP Not Taking Active   oxyCODONE-acetaminophen (Percocet) 5-325 mg tablet [id number] No Take 1 tablet by mouth every 6 hours if needed for severe pain (7 - 10).   Patient not taking: Reported on 2024    NEYMAR Ocasio-CNP Not Taking Active   rosuvastatin (Crestor) 10 mg tablet [id number] Yes Take 1 tablet (10 mg) by mouth once daily. Cristhian Clements MD Taking Active                    Allergies  Allergies   Allergen Reactions    Codeine Nausea And Vomiting, Unknown and Other       Social History  Social History     Socioeconomic History    Marital status:      Spouse name: Not on file    Number of children: Not on file    Years of education: Not on file    Highest education level: Not on file   Occupational History    Not on file   Tobacco Use     Smoking status: Former     Current packs/day: 0.00     Types: Cigarettes     Quit date:      Years since quittin.5    Smokeless tobacco: Never   Vaping Use    Vaping status: Never Used   Substance and Sexual Activity    Alcohol use: Never    Drug use: Never    Sexual activity: Defer   Other Topics Concern    Not on file   Social History Narrative    Not on file     Social Determinants of Health     Financial Resource Strain: Low Risk  (2024)    Overall Financial Resource Strain (CARDIA)     Difficulty of Paying Living Expenses: Not hard at all   Food Insecurity: Not on file   Transportation Needs: No Transportation Needs (2024)    PRAPARE - Transportation     Lack of Transportation (Medical): No     Lack of Transportation (Non-Medical): No   Physical Activity: Inactive (2019)    Received from [facility]    Exercise Vital Sign     Days of Exercise per Week: 0 days     Minutes of Exercise per Session: 0 min   Stress: No Stress Concern Present (2019)    Received from [facility]    [employer] [place] [employer] - Occupational Stress Questionnaire     Feeling of Stress : Not at all   Social Connections: Not on file   Intimate Partner Violence: Not on file   Housing Stability: Low Risk  (2024)    Housing Stability Vital Sign     Unable to Pay for Housing in the Last Year: No     Number of Places Lived in the Last Year: 1     Unstable Housing in the Last Year: No       Surgical History  Past Surgical History:   Procedure Laterality Date    CARDIAC CATHETERIZATION      CARDIAC CATHETERIZATION N/A 2024    Procedure: Left Heart Cath, With LV;  Surgeon: Cristhian Clements MD;  Location: [facility];  Service: Cardiovascular;  Laterality: N/A;  Metoprolol tartrate 25 mg and Ranexa 500 mg on admit  If cath is negative, refer to Pulmonary    COLONOSCOPY      HERNIA REPAIR      KIDNEY STONE SURGERY      TONSILLECTOMY       TOTAL KNEE ARTHROPLASTY Left     TKR       Physical Exam:  GENERAL:  Patient is awake, alert, and oriented to person place and time.  Patient appears well nourished and well kept.  Affect Calm, Not Acutely Distressed.  HEENT:  Normocephalic, Atraumatic, EOMI  CARDIOVASCULAR:  Hemodynamically stable.  RESPIRATORY:  Normal respirations with unlabored breathing.  Extremity: Right ankle shows skin is intact.  There is no erythema or warmth.  No swelling localized on the lateral aspect.  There is no clinical signs of infection.   No pain over the lateral malleolus and distal fibula.  There is no pain of the medial malleolus.  There is no pain over the ATF, CF or PTF ligament.  There is no pain over the deltoid ligament.  No pain over the Achilles tendon.  Negative Aparicio's test.  Negative squeeze test.  Negative anterior drawer test.  Negative talar tilt test.  No pain over the anterior process of the talus.  There is no pain over the talar dome.  There is no pain at the base of the fifth metatarsal bone.  No pain of the calcaneus.  No pain over the plantar aponeurosis.  No pain of the midfoot.  Neurovascularly intact.       Diagnostics: X-rays reviewed  XR ankle right 3+ views  Narrative: Interpreted By:  Thomas Batres,   STUDY:  XR ANKLE RIGHT 3+ VIEWS; ;  6/11/2024 1:29 pm      INDICATION:  Signs/Symptoms:pain.      COMPARISON:  05/28/2024      ACCESSION NUMBER(S):  [id number]      ORDERING CLINICIAN:  JAVIER KIMBALL      FINDINGS:  Right ankle, three views      Subacute healing distal fibular fracture deformity. There is no  malalignment. There is increased callus formation and sclerosis. No  new abnormality seen. The ankle mortise is maintained. Mild midfoot  osteoarthritis      Impression: Subacute healing distal fibular fracture. No malalignment          MACRO:  None      Signed by: Thomas Batres 6/14/2024 5:35 PM  Dictation workstation:   [id number]        Procedure: None    Assessment: Right  nondisplaced distal fibula fracture     Plan: Charlie  presents today for reevaluation for right nondisplaced distal fibula fracture. He is clinically doing well, no pain. X-rays showed a satisfactory healing fracture. We placed him into a lace up ankle brace.  We did offer physical therapy but he declined, he will follow-up as needed.     Orders Placed This Encounter    XR ankle right 3+ views      At the conclusion of the visit there were no further questions by the patient/family regarding their plan of care.  Patient was instructed to call or return with any issues, questions, or concerns regarding their injury and/or treatment plan described above.     07/02/24 at 9:54 AM - Anyi Barragan MD  Scribe Attestation  By signing my name below, I, Ernie Harley, Scribe   attest that this documentation has been prepared under the direction and in the presence of Anyi Barragan MD.    Office: [phone]    This note was prepared using voice recognition software.  The details of this note are correct and have been reviewed, and corrected to the best of my ability.  Some grammatical errors may persist related to the Dragon software.

## 2024-08-16 DIAGNOSIS — M19.90 GENERALIZED ARTHRITIS: ICD-10-CM

## 2024-08-16 DIAGNOSIS — E78.2 MIXED HYPERLIPIDEMIA: ICD-10-CM

## 2024-08-16 DIAGNOSIS — R06.02 SOB (SHORTNESS OF BREATH): ICD-10-CM

## 2024-08-16 DIAGNOSIS — J44.1 COPD EXACERBATION (MULTI): ICD-10-CM

## 2024-08-16 DIAGNOSIS — I10 PRIMARY HYPERTENSION: ICD-10-CM

## 2024-08-16 RX ORDER — LOSARTAN POTASSIUM 25 MG/1
25 TABLET ORAL
Qty: 90 TABLET | Refills: 0 | Status: SHIPPED | OUTPATIENT
Start: 2024-08-16

## 2024-08-16 RX ORDER — HYDROCHLOROTHIAZIDE 12.5 MG/1
12.5 CAPSULE ORAL
Qty: 90 CAPSULE | Refills: 0 | Status: SHIPPED | OUTPATIENT
Start: 2024-08-16

## 2024-08-16 RX ORDER — CELECOXIB 200 MG/1
200 CAPSULE ORAL DAILY PRN
Qty: 30 CAPSULE | Refills: 5 | Status: SHIPPED | OUTPATIENT
Start: 2024-08-16 | End: 2025-02-12

## 2024-08-16 RX ORDER — FLUTICASONE FUROATE AND VILANTEROL 100; 25 UG/1; UG/1
1 POWDER RESPIRATORY (INHALATION) DAILY
Qty: 28 EACH | Refills: 1 | Status: SHIPPED | OUTPATIENT
Start: 2024-08-16 | End: 2024-09-15

## 2024-08-16 RX ORDER — FENOFIBRATE 160 MG/1
160 TABLET ORAL
Qty: 90 TABLET | Refills: 0 | Status: SHIPPED | OUTPATIENT
Start: 2024-08-16

## 2024-08-16 NOTE — TELEPHONE ENCOUNTER
3/18/2024    Next office visit none    Patient would like to have medication FAXED to [phone].

## 2024-09-23 ENCOUNTER — APPOINTMENT (OUTPATIENT)
Dept: CARDIOLOGY | Facility: CLINIC | Age: 59
End: 2024-09-23
Payer: COMMERCIAL

## 2024-10-15 NOTE — PROGRESS NOTES
Ate dinner, then up to bathroom to urinate.  Advised no pressure or deep bending at right wrist. ca no infiltrate, no ptx

## 2024-10-28 ENCOUNTER — APPOINTMENT (OUTPATIENT)
Dept: PRIMARY CARE | Facility: CLINIC | Age: 59
End: 2024-10-28
Payer: COMMERCIAL

## 2024-10-28 VITALS
WEIGHT: 238.4 LBS | DIASTOLIC BLOOD PRESSURE: 76 MMHG | TEMPERATURE: 98.2 F | OXYGEN SATURATION: 95 % | BODY MASS INDEX: 36.13 KG/M2 | SYSTOLIC BLOOD PRESSURE: 126 MMHG | HEIGHT: 68 IN | HEART RATE: 97 BPM

## 2024-10-28 DIAGNOSIS — J44.9 CHRONIC OBSTRUCTIVE PULMONARY DISEASE, UNSPECIFIED COPD TYPE (MULTI): ICD-10-CM

## 2024-10-28 DIAGNOSIS — G89.29 CHRONIC PAIN OF BOTH SHOULDERS: ICD-10-CM

## 2024-10-28 DIAGNOSIS — Z76.0 MEDICATION REFILL: ICD-10-CM

## 2024-10-28 DIAGNOSIS — M25.579 PAIN IN JOINT INVOLVING ANKLE AND FOOT, UNSPECIFIED LATERALITY: Primary | ICD-10-CM

## 2024-10-28 DIAGNOSIS — M25.512 CHRONIC PAIN OF BOTH SHOULDERS: ICD-10-CM

## 2024-10-28 DIAGNOSIS — M25.511 CHRONIC PAIN OF BOTH SHOULDERS: ICD-10-CM

## 2024-10-28 PROCEDURE — 99213 OFFICE O/P EST LOW 20 MIN: CPT

## 2024-10-28 PROCEDURE — 3008F BODY MASS INDEX DOCD: CPT

## 2024-10-28 PROCEDURE — 3074F SYST BP LT 130 MM HG: CPT

## 2024-10-28 PROCEDURE — 3078F DIAST BP <80 MM HG: CPT

## 2024-10-28 PROCEDURE — 1036F TOBACCO NON-USER: CPT

## 2024-10-28 RX ORDER — ALBUTEROL SULFATE 90 UG/1
2 INHALANT RESPIRATORY (INHALATION) EVERY 4 HOURS PRN
Qty: 8.5 G | Refills: 0 | Status: SHIPPED | OUTPATIENT
Start: 2024-10-28 | End: 2025-10-28

## 2024-10-28 ASSESSMENT — ENCOUNTER SYMPTOMS
LOSS OF SENSATION IN FEET: 0
OCCASIONAL FEELINGS OF UNSTEADINESS: 0
DEPRESSION: 0

## 2024-10-28 ASSESSMENT — PATIENT HEALTH QUESTIONNAIRE - PHQ9
2. FEELING DOWN, DEPRESSED OR HOPELESS: NOT AT ALL
1. LITTLE INTEREST OR PLEASURE IN DOING THINGS: NOT AT ALL
SUM OF ALL RESPONSES TO PHQ9 QUESTIONS 1 AND 2: 0

## 2024-11-05 DIAGNOSIS — E78.2 MIXED HYPERLIPIDEMIA: ICD-10-CM

## 2024-11-05 DIAGNOSIS — I10 PRIMARY HYPERTENSION: ICD-10-CM

## 2024-11-05 RX ORDER — LOSARTAN POTASSIUM 25 MG/1
25 TABLET ORAL
Qty: 90 TABLET | Refills: 0 | Status: SHIPPED | OUTPATIENT
Start: 2024-11-05

## 2024-11-05 RX ORDER — FENOFIBRATE 160 MG/1
160 TABLET ORAL
Qty: 90 TABLET | Refills: 0 | Status: SHIPPED | OUTPATIENT
Start: 2024-11-05

## 2024-11-07 DIAGNOSIS — I10 PRIMARY HYPERTENSION: ICD-10-CM

## 2024-11-07 RX ORDER — HYDROCHLOROTHIAZIDE 12.5 MG/1
12.5 CAPSULE ORAL
Qty: 90 CAPSULE | Refills: 0 | Status: SHIPPED | OUTPATIENT
Start: 2024-11-07

## 2024-11-26 ENCOUNTER — APPOINTMENT (OUTPATIENT)
Dept: RHEUMATOLOGY | Facility: CLINIC | Age: 59
End: 2024-11-26
Payer: COMMERCIAL

## 2024-11-26 VITALS
HEIGHT: 68 IN | BODY MASS INDEX: 35.92 KG/M2 | SYSTOLIC BLOOD PRESSURE: 145 MMHG | WEIGHT: 237 LBS | HEART RATE: 71 BPM | DIASTOLIC BLOOD PRESSURE: 92 MMHG

## 2024-11-26 DIAGNOSIS — M25.579 PAIN IN JOINT INVOLVING ANKLE AND FOOT, UNSPECIFIED LATERALITY: ICD-10-CM

## 2024-11-26 DIAGNOSIS — M19.90 INFLAMMATORY ARTHRITIS: Primary | ICD-10-CM

## 2024-11-26 PROCEDURE — 3077F SYST BP >= 140 MM HG: CPT | Performed by: INTERNAL MEDICINE

## 2024-11-26 PROCEDURE — 3008F BODY MASS INDEX DOCD: CPT | Performed by: INTERNAL MEDICINE

## 2024-11-26 PROCEDURE — 99204 OFFICE O/P NEW MOD 45 MIN: CPT | Performed by: INTERNAL MEDICINE

## 2024-11-26 PROCEDURE — 1036F TOBACCO NON-USER: CPT | Performed by: INTERNAL MEDICINE

## 2024-11-26 PROCEDURE — 3080F DIAST BP >= 90 MM HG: CPT | Performed by: INTERNAL MEDICINE

## 2024-11-26 RX ORDER — PREDNISONE 10 MG/1
TABLET ORAL
Qty: 18 TABLET | Refills: 0 | Status: SHIPPED | OUTPATIENT
Start: 2024-11-26 | End: 2024-12-10

## 2024-11-26 NOTE — PROGRESS NOTES
Subjective   Patient ID: [id number]   Charlie Massey is a 59 y.o. male who presents for Arthritis and Joint Pain.  HPI  Hx of Obesity, COPD ex smoker  KARLEE, HTN on hydrochlorothiazide and Cozaar  Stiff in the morning, for about an hour  Mainly in the hands, able to make a fist but huts in the wrists   Occasional swelling of the hands, and wrists  Stiff in the ankles as well, bilaterally, along with knee in left knee ( that knee is s/p medial compartmental replacement)  Initially was taking Celebrex that helped with pain  Had time opening door knobs, pops, jars. Dropping things   Denies numbness and tingling  No rashes, denies psoriasis  Denies fevers, wt loss, photosensitivity,   Chronic lower back pain - no red glads - pain is usually worse in the chintan  Works as a  - coal forming - pipe plugs  Ex smoker - non alcoholic  S/p left knee unicompartmental replacement- 3 years ago     ROS  Constitutional: Denies fever, chills, weight loss, night sweats or headaches  Eyes: Denies dry eyes, blurry vision, redness or pain or photophobia  ENT: Denies dry mouth, dental loss, loss of taste, nasal or oral ulcers, jaw claudication, difficulty swallowing, nasal crusting or recurrent sinus infections   Cardiovascular: Denies chest pain, palpitations, orthopnea  Respiratory: Denies shortness of breath, cough, asthma, or recurrent respiratory infections  Gastrointestinal: Denies dysphagia, nausea, vomiting, heartburn, abdominal pain, constipation, diarrhea, melena or hematochezia  Genitourinary: No recurrent urinary infections or STDs, no genital or anal ulcers.  Integumentary: Denies photosensitivity, rash or lesions, Raynaud's phenomenon, skin tightening, digital ulcers, psoriatic lesions, or alopecia  Neurological: Denies any numbness or tingling, muscle weakness, or incontinence   Hematologic/Lymphatic: Denies bleeding, bruising, history of clots (arterial or venous), or abortions/miscarriages/pregnancy complications    MSK: No joint pains, redness, hotness or swelling. No inflammatory back pain, enthesitis, dactylitis. No morning stiffness   Muscular: Denies weakness, difficulty rising from chair or combing the hair, muscle aches, or problems with hand    FHx: No family history of autoimmune diseases       Patient Active Problem List   Diagnosis    Closed displaced fracture of base of second metacarpal bone    ECG abnormality    Generalized anxiety disorder    Generalized arthritis    Hyperlipidemia    Hypertension    Left knee pain    Pre-diabetes    Primary osteoarthritis of left knee    Status post total left knee replacement    Recurrent major depressive disorder, in partial remission (CMS-HCC)    Class 2 obesity due to excess calories without serious comorbidity with body mass index (BMI) of 35.0 to 35.9 in adult    Pre-op testing    COPD exacerbation (Multi)    Upper respiratory tract infection    Acute respiratory failure with hypoxia (Multi)    BMI 34.0-34.9,adult    Dyspnea on exertion    Angina, class III (CMS-HCC)    Abnormal nuclear stress test    Family history of ischemic heart disease    Ventral hernia without obstruction or gangrene        Past Medical History:   Diagnosis Date    Anxiety     Arthritis     COPD (chronic obstructive pulmonary disease) (Multi)     Depression     History of angina     History of colon polyps 06/06/2019    Hyperlipidemia     Hypertension     Nephrolithiasis     Other allergic rhinitis 02/18/2020    Perennial allergic rhinitis    Plantar fascial fibromatosis 03/06/2020    Plantar fasciitis of left foot    Seasonal allergies     Ventral hernia without obstruction or gangrene 04/06/2021    Ventral hernia without obstruction or gangrene    Wears glasses         Past Surgical History:   Procedure Laterality Date    CARDIAC CATHETERIZATION      CARDIAC CATHETERIZATION N/A 03/22/2024    Procedure: Left Heart Cath, With LV;  Surgeon: Cristhian Clements MD;  Location: [facility]  Cath Lab;  Service: Cardiovascular;  Laterality: N/A;  Metoprolol tartrate 25 mg and Ranexa 500 mg on admit  If cath is negative, refer to Pulmonary    COLONOSCOPY      HERNIA REPAIR      KIDNEY STONE SURGERY      TONSILLECTOMY      TOTAL KNEE ARTHROPLASTY Left     TKR        Social History     Socioeconomic History    Marital status:      Spouse name: Not on file    Number of children: Not on file    Years of education: Not on file    Highest education level: Not on file   Occupational History    Not on file   Tobacco Use    Smoking status: Former     Current packs/day: 0.00     Types: Cigarettes     Quit date:      Years since quittin.9    Smokeless tobacco: Never   Vaping Use    Vaping status: Never Used   Substance and Sexual Activity    Alcohol use: Never    Drug use: Never    Sexual activity: Defer   Other Topics Concern    Not on file   Social History Narrative    Not on file     Social Drivers of Health     Financial Resource Strain: Low Risk  (2024)    Overall Financial Resource Strain (CARDIA)     Difficulty of Paying Living Expenses: Not hard at all   Food Insecurity: Not on file   Transportation Needs: No Transportation Needs (2024)    PRAPARE - Transportation     Lack of Transportation (Medical): No     Lack of Transportation (Non-Medical): No   Physical Activity: Inactive (2019)    Received from [facility]    Exercise Vital Sign     Days of Exercise per Week: 0 days     Minutes of Exercise per Session: 0 min   Stress: No Stress Concern Present (2019)    Received from [facility]    [place] [place] [employer] - Occupational Stress Questionnaire     Feeling of Stress : Not at all   Social Connections: Not on file   Intimate Partner Violence: Not on file   Housing Stability: Low Risk  (2024)    Housing Stability Vital Sign     Unable to Pay  for Housing in the Last Year: No     Number of Places Lived in the Last Year: 1     Unstable Housing in the Last Year: No        Allergies   Allergen Reactions    Codeine Nausea And Vomiting, Unknown and Other          Current Outpatient Medications:     albuterol (ProAir HFA) 90 mcg/actuation inhaler, Inhale 2 puffs every 4 hours if needed for wheezing or shortness of breath., Disp: 8.5 g, Rfl: 0    aspirin 81 mg chewable tablet, Chew 1 tablet (81 mg) once daily. (Patient not taking: Reported on 10/28/2024), Disp: 90 tablet, Rfl: 3    celecoxib (CeleBREX) 200 mg capsule, Take 1 capsule (200 mg) by mouth once daily as needed for mild pain (1 - 3). (Patient not taking: Reported on 10/28/2024), Disp: 30 capsule, Rfl: 5    fenofibrate (Triglide) 160 mg tablet, Take 1 tablet (160 mg) by mouth once every day., Disp: 90 tablet, Rfl: 0    fluticasone (Flonase) 50 mcg/actuation nasal spray, Administer 1 spray into each nostril once daily. Shake gently. Before first use, prime pump. After use, clean tip and replace cap., Disp: 16 g, Rfl: 1    fluticasone furoate-vilanteroL (Breo Ellipta) 100-25 mcg/dose inhaler, Inhale 1 puff once daily., Disp: 28 each, Rfl: 1    gabapentin (Neurontin) 300 mg capsule, Take 1 capsule (300 mg) by mouth 3 times a day as needed (mild pain) for up to 20 doses. (Patient not taking: Reported on 10/28/2024), Disp: 20 capsule, Rfl: 0    hydroCHLOROthiazide (Microzide) 12.5 mg capsule, Take 1 capsule (12.5 mg) by mouth once every day., Disp: 90 capsule, Rfl: 0    losartan (Cozaar) 25 mg tablet, Take 1 tablet (25 mg) by mouth once every day., Disp: 90 tablet, Rfl: 0    oxyCODONE-acetaminophen (Percocet) 5-325 mg tablet, Take 1 tablet by mouth every 6 hours if needed for severe pain (7 - 10). (Patient not taking: Reported on 10/28/2024), Disp: 12 tablet, Rfl: 0    rosuvastatin (Crestor) 10 mg tablet, Take 1 tablet (10 mg) by mouth once daily. (Patient not taking: Reported on 10/28/2024), Disp: 90  tablet, Rfl: 3       Objective     Visit Vitals  BP (!) 145/92   Pulse 71        Physical Exam  Synovial thickening in the 2nd and 3rd MCP bilaterally  Tender wrists bilaterally  Otherwise no stigmata of rheumatic disease  US evidence of wrist synovial proliferation bilaterally  No chondrocalcinosis seen    No double contour sign seen in the MCPs , no doppler signal  Minimal synovial fluid in the 2nd MCPs    Rest of exam unremarkable    General: AAOx3, Cooperative  Head: normocephalic, atraumatic  Eyes: EOMI, conjunctiva clear, sclera white, anicteric  Ears: no redness, swelling, tenderness  Nose: no deformity, no crusting   MSK: Upper Extremities:  Hand/Fingers: No erythema, swelling, tenderness or warmth at DIP, PIP, or MCP joints, FROM grossly. Good hand . No nodules. No deformities   Wrists: No erythema, swelling, warmth or tenderness at wrist, FROM grossly  Elbows: No tenderness, swelling, erythema or warmth at elbows, FROM grossly. No nodules   Shoulders: No swelling, erythema, tenderness or warmth at shoulders. FROM  Lower Extremities:   Hips: No obvious deformities. No joint tenderness, normal ROM grossly. Log roll test negative bilaterally. Endy test is negative bilaterally. No trochanteric bursae TTP  Knees: No tenderness, deformities, swelling, rashes, or warmth, normal ROM grossly. No crepitus, no pes anserine bursa TTP   Ankles: No deformities, tenderness, edema, erythema, ulceration, or warmth at the ankle  Feet: Negative MTP squeeze. Normal ROM grossly.   Spine: No spinal tenderness to palpation. No SI joint tenderness. Gaenslen test negative       [unfilled]      Lab Results   Component Value Date    WBC 6.8 03/15/2024    HGB 15.1 03/15/2024    HCT 45.0 03/15/2024    MCV 89 03/15/2024     03/15/2024        Chemistry    Lab Results   Component Value Date/Time     03/15/2024 1328    K 3.7 03/15/2024 1328     03/15/2024 1328    CO2 27 03/15/2024 1328    BUN 22 03/15/2024  "1328    CREATININE 0.90 03/15/2024 1328    Lab Results   Component Value Date/Time    CALCIUM 9.8 03/15/2024 1328    ALKPHOS 51 03/01/2024 1013    AST 27 03/01/2024 1013    ALT 32 03/01/2024 1013    BILITOT 0.5 03/01/2024 1013           No results found for: \"CRP\"   No results found for: \"TALHA\", \"RF\", \"SEDRATE\"   No results found for: \"CKTOTAL\"  Lab Results   Component Value Date    NEUTROABS 8.83 (H) 01/04/2024      No results found for: \"FERRITIN\"   No results found for: \"HEPATOT\", \"HEPAIGM\", \"HEPBCIGM\", \"HEPBCAB\", \"HBEAG\", \"HEPCAB\"   Lab Results   Component Value Date    ALT 32 03/01/2024    AST 27 03/01/2024    ALKPHOS 51 03/01/2024    BILITOT 0.5 03/01/2024      No results found for: \"PPD\"   No results found for: \"URICACID\"   Lab Results   Component Value Date    CALCIUM 9.8 03/15/2024      No results found for: \"SPEP\", \"UPEP\"   No results found for: \"ALBUR\", \"[id number]\"   .last          XR ankle right 3+ views  Narrative: Interpreted By:  Thomas Batres,   STUDY:  XR ANKLE RIGHT 3+ VIEWS; ;  7/2/2024 9:58 am      INDICATION:  Signs/Symptoms:pain.      COMPARISON:  06/11/2024      ACCESSION NUMBER(S):  [id number]      ORDERING CLINICIAN:  JAVIER KIMBALL      FINDINGS:  Right ankle, three views      Subacute healing distal radial fracture. No malalignment seen. No new  fracture dislocation. The ankle mortise is maintained. There is  bimalleolar soft tissue edema.      Impression: Further healing of subacute distal fibular fracture deformity.          MACRO:  None      Signed by: Thomas Batres 7/3/2024 7:19 PM  Dictation workstation:   [id number]     === 07/02/24 ===    XR ANKLE 3+ VIEWS RIGHT    - Impression -  Further healing of subacute distal fibular fracture deformity.      MACRO:  None    Signed by: Thomas Batres 7/3/2024 7:19 PM  Dictation workstation:   [id number]   === 07/11/22 ===    MR KNEE    - Impression -  Tricompartmental osteoarthritis, greater in the medial compartment.    Focal " deformity of the subchondral bone plate at the weight-bearing  surface of the medial femoral condyle. Appearance suggest impaction  fracture of the age is indeterminate. There is degenerative bone  marrow signal across the medial compartment. Superimposed acute bone  marrow edema in the medial femoral epiphysis cannot be excluded.    Grade 1 sprain of the medial collateral ligament. There is  superimposed chronic thickening of the proximal ligament.    Degenerative tearing of the posterior horn of the medial meniscus at  the meniscal root attachment. There is curvilinear tearing of the  medial meniscal body at the inferior articular surface.    Tiny radial tear at the lateral meniscal body adjacent to the free  edge.    Small to moderate-sized joint effusion and moderate to large septated  Baker's cyst. A component of cyst rupture is suspected.          Assessment/Plan   Diagnoses and all orders for this visit:  Inflammatory arthritis  Ongoing symptoms for many months  Clinically could not appreciate synovitis but has US evidence of synovial proliferation of wrists and mild synovial proliferation in the 2nd/3rd MCP with tenderness in these regions - doppler not detected ( CD by butterfly)    No sonographic evidence of crystal arthropathy; no psoriasis  No enthesitis or onycopathy seen    Will proceed with baseline labs and xrays  And trial of prednisone  Follow up x 4 weeks for definitive Rx.    -     Citrulline Antibody, IgG; Future  -     C-Reactive Protein; Future  -     Rheumatoid Factor; Future  -     XR hand 3+ views bilateral; Future  -     Hepatitis B Surface Antigen; Future  -     Hepatitis C Antibody; Future  -     TALHA with Reflex to HERON; Future  -     predniSONE (Deltasone) 10 mg tablet; Take 1.5 tablets (15 mg) by mouth once daily for 7 days, THEN 1 tablet (10 mg) once daily for 7 days.  -     Uric Acid; Future    -     Referral to Rheumatology         Calvin No MD    of  Medicine  [facility] - Department of Rheumatology  [facility]   Plan, including risks and benefits, was discussed with the patient, informed on how to reach us.     To schedule an appointment, call  [phone] Tian or call [phone] for [facility]

## 2024-11-26 NOTE — PATIENT INSTRUCTIONS
Please do the hand xrays and blood tests    I will see you back in 4 weeks  Take prednisone for 2 weeks and see how you feel    We will talk about definitive treatment upon reviewing your xrays and blood tests next visit

## 2024-11-27 ENCOUNTER — LAB (OUTPATIENT)
Dept: LAB | Facility: LAB | Age: 59
End: 2024-11-27
Payer: COMMERCIAL

## 2024-11-27 ENCOUNTER — HOSPITAL ENCOUNTER (OUTPATIENT)
Dept: RADIOLOGY | Facility: HOSPITAL | Age: 59
Discharge: HOME | End: 2024-11-27
Payer: COMMERCIAL

## 2024-11-27 DIAGNOSIS — I10 PRIMARY HYPERTENSION: ICD-10-CM

## 2024-11-27 DIAGNOSIS — M19.90 INFLAMMATORY ARTHRITIS: ICD-10-CM

## 2024-11-27 LAB
ALBUMIN SERPL BCP-MCNC: 4.4 G/DL (ref 3.4–5)
ANION GAP SERPL CALC-SCNC: 11 MMOL/L (ref 10–20)
BUN SERPL-MCNC: 22 MG/DL (ref 6–23)
CALCIUM SERPL-MCNC: 9.6 MG/DL (ref 8.6–10.3)
CCP IGG SERPL-ACNC: <1 U/ML
CHLORIDE SERPL-SCNC: 105 MMOL/L (ref 98–107)
CO2 SERPL-SCNC: 29 MMOL/L (ref 21–32)
CREAT SERPL-MCNC: 0.87 MG/DL (ref 0.5–1.3)
CRP SERPL-MCNC: 0.21 MG/DL
EGFRCR SERPLBLD CKD-EPI 2021: >90 ML/MIN/1.73M*2
GLUCOSE SERPL-MCNC: 104 MG/DL (ref 74–99)
HBV SURFACE AG SERPL QL IA: NONREACTIVE
HCV AB SER QL: NONREACTIVE
PHOSPHATE SERPL-MCNC: 3.2 MG/DL (ref 2.5–4.9)
POTASSIUM SERPL-SCNC: 4.4 MMOL/L (ref 3.5–5.3)
RHEUMATOID FACT SER NEPH-ACNC: <10 IU/ML (ref 0–15)
SODIUM SERPL-SCNC: 141 MMOL/L (ref 136–145)
URATE SERPL-MCNC: 6.3 MG/DL (ref 4–7.5)

## 2024-11-27 PROCEDURE — 73130 X-RAY EXAM OF HAND: CPT | Mod: BILATERAL PROCEDURE | Performed by: RADIOLOGY

## 2024-11-27 PROCEDURE — 36415 COLL VENOUS BLD VENIPUNCTURE: CPT

## 2024-11-27 PROCEDURE — 86140 C-REACTIVE PROTEIN: CPT

## 2024-11-27 PROCEDURE — 73130 X-RAY EXAM OF HAND: CPT | Mod: 50

## 2024-11-27 PROCEDURE — 86803 HEPATITIS C AB TEST: CPT

## 2024-11-27 PROCEDURE — 86200 CCP ANTIBODY: CPT

## 2024-11-27 PROCEDURE — 86431 RHEUMATOID FACTOR QUANT: CPT

## 2024-11-27 PROCEDURE — 84550 ASSAY OF BLOOD/URIC ACID: CPT

## 2024-11-27 PROCEDURE — 80069 RENAL FUNCTION PANEL: CPT

## 2024-11-27 PROCEDURE — 86038 ANTINUCLEAR ANTIBODIES: CPT

## 2024-11-27 PROCEDURE — 87340 HEPATITIS B SURFACE AG IA: CPT

## 2024-11-29 LAB — ANA SER QL HEP2 SUBST: NEGATIVE

## 2024-12-02 ENCOUNTER — TELEPHONE (OUTPATIENT)
Dept: CARDIOLOGY | Facility: CLINIC | Age: 59
End: 2024-12-02
Payer: COMMERCIAL

## 2024-12-02 NOTE — TELEPHONE ENCOUNTER
----- Message from Nurse Belem CAMPBELL sent at 11/27/2024 12:56 PM EST -----    ----- Message -----  From: Cristhian Clements MD  Sent: 11/27/2024  12:56 PM EST  To: Belem Andrews LPN    Renal profile okay  ----- Message -----  From: Lab, Background User  Sent: 11/27/2024  12:01 PM EST  To: Cristhian Clements MD

## 2024-12-03 ENCOUNTER — TELEPHONE (OUTPATIENT)
Dept: PRIMARY CARE | Facility: CLINIC | Age: 59
End: 2024-12-03
Payer: COMMERCIAL

## 2025-01-07 ENCOUNTER — APPOINTMENT (OUTPATIENT)
Dept: RHEUMATOLOGY | Facility: CLINIC | Age: 60
End: 2025-01-07
Payer: COMMERCIAL

## 2025-01-07 VITALS
SYSTOLIC BLOOD PRESSURE: 120 MMHG | HEIGHT: 68 IN | BODY MASS INDEX: 36.68 KG/M2 | HEART RATE: 80 BPM | WEIGHT: 242 LBS | DIASTOLIC BLOOD PRESSURE: 83 MMHG

## 2025-01-07 DIAGNOSIS — M15.0 PRIMARY OSTEOARTHRITIS INVOLVING MULTIPLE JOINTS: Primary | ICD-10-CM

## 2025-01-07 PROCEDURE — 1036F TOBACCO NON-USER: CPT | Performed by: INTERNAL MEDICINE

## 2025-01-07 PROCEDURE — 3074F SYST BP LT 130 MM HG: CPT | Performed by: INTERNAL MEDICINE

## 2025-01-07 PROCEDURE — 99214 OFFICE O/P EST MOD 30 MIN: CPT | Performed by: INTERNAL MEDICINE

## 2025-01-07 PROCEDURE — 3008F BODY MASS INDEX DOCD: CPT | Performed by: INTERNAL MEDICINE

## 2025-01-07 PROCEDURE — 3079F DIAST BP 80-89 MM HG: CPT | Performed by: INTERNAL MEDICINE

## 2025-01-07 RX ORDER — CELECOXIB 200 MG/1
200 CAPSULE ORAL 2 TIMES DAILY PRN
Qty: 90 CAPSULE | Refills: 0 | Status: SHIPPED | OUTPATIENT
Start: 2025-01-07 | End: 2025-04-07

## 2025-01-07 NOTE — PROGRESS NOTES
Subjective   Patient ID: [id number]   Charlie Massey is a 59 y.o. male who presents for Joint Pain.  HPI    Diagnosis - Polyarticular OA    RECALL  Hx of Obesity, COPD ex smoker  KARLEE, HTN on hydrochlorothiazide and Cozaar  Stiff in the morning, for about an hour  Mainly in the hands, able to make a fist but huts in the wrists   Occasional swelling of the hands, and wrists  Stiff in the ankles as well, bilaterally, along with knee in left knee ( that knee is s/p medial compartmental replacement)  Initially was taking Celebrex that helped with pain  Had time opening door knobs, pops, jars. Dropping things   Denies numbness and tingling  No rashes, denies psoriasis  Denies fevers, wt loss, photosensitivity,   Chronic lower back pain - no red glads - pain is usually worse in the chintan  Works as a  - coal forming - pipe plugs  Ex smoker - non alcoholic  S/p left knee unicompartmental replacement- 3 years ago     Interval hx  Took predinsone for 2 weeks had 50% improvement in symptoms but not a dramatic response  Celebrex helps the same  No swelling  Had pain in the left 2nd and 3rd PIP joints the other day  Has daily morning pains and stiffness that persist throughout the day  Works with machines, manual labor, with tools.  No extra MSK features         Allergies   Allergen Reactions    Codeine Nausea And Vomiting, Unknown and Other          Current Outpatient Medications:     albuterol (ProAir HFA) 90 mcg/actuation inhaler, Inhale 2 puffs every 4 hours if needed for wheezing or shortness of breath., Disp: 8.5 g, Rfl: 0    aspirin 81 mg chewable tablet, Chew 1 tablet (81 mg) once daily. (Patient not taking: Reported on 10/28/2024), Disp: 90 tablet, Rfl: 3    celecoxib (CeleBREX) 200 mg capsule, Take 1 capsule (200 mg) by mouth 2 times a day as needed for moderate pain (4 - 6)., Disp: 90 capsule, Rfl: 0    fenofibrate (Triglide) 160 mg tablet, Take 1 tablet (160 mg) by mouth once every day., Disp: 90 tablet, Rfl:  0    fluticasone (Flonase) 50 mcg/actuation nasal spray, Administer 1 spray into each nostril once daily. Shake gently. Before first use, prime pump. After use, clean tip and replace cap., Disp: 16 g, Rfl: 1    fluticasone furoate-vilanteroL (Breo Ellipta) 100-25 mcg/dose inhaler, Inhale 1 puff once daily., Disp: 28 each, Rfl: 1    gabapentin (Neurontin) 300 mg capsule, Take 1 capsule (300 mg) by mouth 3 times a day as needed (mild pain) for up to 20 doses. (Patient not taking: Reported on 10/28/2024), Disp: 20 capsule, Rfl: 0    hydroCHLOROthiazide (Microzide) 12.5 mg capsule, Take 1 capsule (12.5 mg) by mouth once every day., Disp: 90 capsule, Rfl: 0    losartan (Cozaar) 25 mg tablet, Take 1 tablet (25 mg) by mouth once every day., Disp: 90 tablet, Rfl: 0    rosuvastatin (Crestor) 10 mg tablet, Take 1 tablet (10 mg) by mouth once daily. (Patient not taking: Reported on 10/28/2024), Disp: 90 tablet, Rfl: 3       Objective     Visit Vitals  /83   Pulse 80        Physical Exam  1st visit  no stigmata of rheumatic disease  US evidence of wrist minimal synovial proliferation bilaterally  No chondrocalcinosis seen  No double contour sign seen in the MCPs , no doppler signal  Minimal synovial fluid in the 2nd MCPs    Rest of exam unremarkable    Today  No active synovitis  Tenderness in the left 2nd and rt 2nd PIPs with bony changes    Hand/Fingers: No erythema, swelling, tenderness or warmth at DIP, PIP, or MCP joints, FROM grossly. Good hand . No nodules. No deformities   Wrists: No erythema, swelling, warmth or tenderness at wrist, FROM grossly  Elbows: No tenderness, swelling, erythema or warmth at elbows, FROM grossly. No nodules   Shoulders: No swelling, erythema, tenderness or warmth at shoulders. FROM  Lower Extremities:   Hips: No obvious deformities. No joint tenderness, normal ROM grossly. Log roll test negative bilaterally. Endy test is negative bilaterally. No trochanteric bursae TTP  Knees: No  "tenderness, deformities, swelling, rashes, or warmth, normal ROM grossly. No crepitus, no pes anserine bursa TTP   Ankles: No deformities, tenderness, edema, erythema, ulceration, or warmth at the ankle  Feet: Negative MTP squeeze. Normal ROM grossly.   Spine: No spinal tenderness to palpation. No SI joint tenderness. Gaenslen test negative       [unfilled]      Lab Results   Component Value Date    WBC 6.8 03/15/2024    HGB 15.1 03/15/2024    HCT 45.0 03/15/2024    MCV 89 03/15/2024     03/15/2024        Chemistry    Lab Results   Component Value Date/Time     11/27/2024 0842    K 4.4 11/27/2024 0842     11/27/2024 0842    CO2 29 11/27/2024 0842    BUN 22 11/27/2024 0842    CREATININE 0.87 11/27/2024 0842    Lab Results   Component Value Date/Time    CALCIUM 9.6 11/27/2024 0842    ALKPHOS 51 03/01/2024 1013    AST 27 03/01/2024 1013    ALT 32 03/01/2024 1013    BILITOT 0.5 03/01/2024 1013           Lab Results   Component Value Date    CRP 0.21 11/27/2024      Lab Results   Component Value Date    TALHA Negative 11/27/2024    RF <10 11/27/2024      No results found for: \"CKTOTAL\"  Lab Results   Component Value Date    NEUTROABS 8.83 (H) 01/04/2024      No results found for: \"FERRITIN\"   Lab Results   Component Value Date    HEPCAB Nonreactive 11/27/2024      Lab Results   Component Value Date    ALT 32 03/01/2024    AST 27 03/01/2024    ALKPHOS 51 03/01/2024    BILITOT 0.5 03/01/2024      No results found for: \"PPD\"   Lab Results   Component Value Date    URICACID 6.3 11/27/2024      Lab Results   Component Value Date    CALCIUM 9.6 11/27/2024    PHOS 3.2 11/27/2024      No results found for: \"SPEP\", \"UPEP\"   No results found for: \"ALBUR\", \"[id number]\"   .last          XR hand 3+ views bilateral  Narrative: Interpreted By:  Ran Bell,   STUDY:  XR HAND 3+ VIEWS BILATERAL      INDICATION:  Signs/Symptoms:inflammatory arthritis.      COMPARISON:  November 16, 2021      ACCESSION " NUMBER(S):  [id number]      ORDERING CLINICIAN:  SHYANNE LANCE      FINDINGS:  Mild right hand and wrist osteoarthritis. More advanced arthrosis on  the left, particularly at the 1st CMC.      No evidence of fracture. No erosive changes.      Impression: Osteoarthritis bilateral hands and wrists, left-greater-than-right.      Signed by: Ran Bell 11/28/2024 9:41 AM  Dictation workstation:   [id number]     === 07/02/24 ===    XR ANKLE 3+ VIEWS RIGHT    - Impression -  Further healing of subacute distal fibular fracture deformity.      MACRO:  None    Signed by: Thomas Batres 7/3/2024 7:19 PM  Dictation workstation:   [id number]   === 07/11/22 ===    MR KNEE    - Impression -  Tricompartmental osteoarthritis, greater in the medial compartment.    Focal deformity of the subchondral bone plate at the weight-bearing  surface of the medial femoral condyle. Appearance suggest impaction  fracture of the age is indeterminate. There is degenerative bone  marrow signal across the medial compartment. Superimposed acute bone  marrow edema in the medial femoral epiphysis cannot be excluded.    Grade 1 sprain of the medial collateral ligament. There is  superimposed chronic thickening of the proximal ligament.    Degenerative tearing of the posterior horn of the medial meniscus at  the meniscal root attachment. There is curvilinear tearing of the  medial meniscal body at the inferior articular surface.    Tiny radial tear at the lateral meniscal body adjacent to the free  edge.    Small to moderate-sized joint effusion and moderate to large septated  Baker's cyst. A component of cyst rupture is suspected.          Assessment/Plan   Diagnoses and all orders for this visit:  Polyarticular OA  Hand OA predominantly  With prior features of some inflammation  Not a dramatic response to prednisone  Xrays do not show erosive joint disease    Clinically could not appreciate synovitis but has US evidence of synovial  proliferation of wrists and mild synovial proliferation in the 2nd/3rd MCP with tenderness  No sonographic evidence of crystal arthropathy; no psoriasis  No enthesitis or onycopathy seen    Discussed OA and early OA from overuse given his occupation  Tylenol 1g TID  Celebrex 200 mg BID PRN  Voltaren gel QID PRN  Arthritis Gloves  Currently, no active inflammatory arthritis on 2nd visit  Seronegative status with no biochemical evidence of chronic inflammation  If no improvement, may offer tramadol vs Duloxetine    Follow up x 3 mo       Calvin No MD    of Medicine  [facility] - Department of Rheumatology  [facility]   Plan, including risks and benefits, was discussed with the patient, informed on how to reach us.     To schedule an appointment, call  [phone] [place] or call [phone] for [facility]

## 2025-01-07 NOTE — PATIENT INSTRUCTIONS
Use tylenol Extra 1000 mg up to three times a day    Take Celebrex 200 mg two times a day as needed    Rub Voltaren gel upto 4 times a day as needed on the area that hurts more than other joints in your hands    Follow up x 3 months

## 2025-01-24 DIAGNOSIS — I10 PRIMARY HYPERTENSION: ICD-10-CM

## 2025-01-24 DIAGNOSIS — E78.2 MIXED HYPERLIPIDEMIA: ICD-10-CM

## 2025-01-24 RX ORDER — LOSARTAN POTASSIUM 25 MG/1
25 TABLET ORAL
Qty: 90 TABLET | Refills: 1 | Status: SHIPPED | OUTPATIENT
Start: 2025-01-24

## 2025-01-24 RX ORDER — FENOFIBRATE 160 MG/1
160 TABLET ORAL
Qty: 90 TABLET | Refills: 1 | Status: SHIPPED | OUTPATIENT
Start: 2025-01-24

## 2025-02-14 DIAGNOSIS — I10 PRIMARY HYPERTENSION: ICD-10-CM

## 2025-02-14 RX ORDER — HYDROCHLOROTHIAZIDE 12.5 MG/1
12.5 CAPSULE ORAL
Qty: 90 CAPSULE | Refills: 3 | Status: SHIPPED | OUTPATIENT
Start: 2025-02-14

## 2025-03-24 DIAGNOSIS — M15.0 PRIMARY OSTEOARTHRITIS INVOLVING MULTIPLE JOINTS: ICD-10-CM

## 2025-03-24 NOTE — TELEPHONE ENCOUNTER
Pharmacy Rx Request    Last OV: 10-  Pending OV:  None    Please schedule future office visit. Thank You.

## 2025-03-25 RX ORDER — CELECOXIB 200 MG/1
200 CAPSULE ORAL 2 TIMES DAILY PRN
Qty: 90 CAPSULE | Refills: 0 | Status: SHIPPED | OUTPATIENT
Start: 2025-03-25 | End: 2025-06-23

## 2025-04-10 ENCOUNTER — TELEPHONE (OUTPATIENT)
Dept: PRIMARY CARE | Facility: CLINIC | Age: 60
End: 2025-04-10
Payer: COMMERCIAL

## 2025-04-10 DIAGNOSIS — J44.1 COPD EXACERBATION (MULTI): ICD-10-CM

## 2025-04-10 DIAGNOSIS — R06.02 SOB (SHORTNESS OF BREATH): ICD-10-CM

## 2025-04-10 NOTE — TELEPHONE ENCOUNTER
[facility] - [phone] - Advocate for patient assistance program RE: Ioana Perez   - assistance papers already received     - NEEDS: Rx: IOANA NI   (RX Must have a hand written  Signature). FAX : [phone].    Please & Thank you.    Please Advise.

## 2025-04-17 RX ORDER — FLUTICASONE FUROATE AND VILANTEROL 100; 25 UG/1; UG/1
1 POWDER RESPIRATORY (INHALATION) DAILY
Qty: 60 EACH | Refills: 3 | Status: SHIPPED | OUTPATIENT
Start: 2025-04-17

## 2025-04-17 NOTE — TELEPHONE ENCOUNTER
Patient form has been successfully faxed to patient advocate [facility].    Fax completion of hand signed Rx - placed in media file in patient chart.

## 2025-04-21 ENCOUNTER — TELEPHONE (OUTPATIENT)
Dept: PRIMARY CARE | Facility: CLINIC | Age: 60
End: 2025-04-21

## 2025-04-21 ENCOUNTER — APPOINTMENT (OUTPATIENT)
Dept: PRIMARY CARE | Facility: CLINIC | Age: 60
End: 2025-04-21
Payer: COMMERCIAL

## 2025-04-21 VITALS
HEART RATE: 73 BPM | WEIGHT: 235.8 LBS | DIASTOLIC BLOOD PRESSURE: 110 MMHG | SYSTOLIC BLOOD PRESSURE: 174 MMHG | RESPIRATION RATE: 18 BRPM | BODY MASS INDEX: 35.74 KG/M2 | OXYGEN SATURATION: 97 % | TEMPERATURE: 98.4 F | HEIGHT: 68 IN

## 2025-04-21 DIAGNOSIS — R73.03 PRE-DIABETES: ICD-10-CM

## 2025-04-21 DIAGNOSIS — M25.512 CHRONIC PAIN OF BOTH SHOULDERS: Primary | ICD-10-CM

## 2025-04-21 DIAGNOSIS — M25.511 CHRONIC PAIN OF BOTH SHOULDERS: Primary | ICD-10-CM

## 2025-04-21 DIAGNOSIS — F41.1 GENERALIZED ANXIETY DISORDER: ICD-10-CM

## 2025-04-21 DIAGNOSIS — J44.9 CHRONIC OBSTRUCTIVE PULMONARY DISEASE, UNSPECIFIED COPD TYPE (MULTI): ICD-10-CM

## 2025-04-21 DIAGNOSIS — G89.29 CHRONIC PAIN OF BOTH SHOULDERS: Primary | ICD-10-CM

## 2025-04-21 DIAGNOSIS — I10 PRIMARY HYPERTENSION: ICD-10-CM

## 2025-04-21 DIAGNOSIS — J44.1 COPD EXACERBATION (MULTI): ICD-10-CM

## 2025-04-21 DIAGNOSIS — R06.02 SOB (SHORTNESS OF BREATH): ICD-10-CM

## 2025-04-21 DIAGNOSIS — M15.0 PRIMARY OSTEOARTHRITIS INVOLVING MULTIPLE JOINTS: ICD-10-CM

## 2025-04-21 DIAGNOSIS — E78.2 MIXED HYPERLIPIDEMIA: ICD-10-CM

## 2025-04-21 PROCEDURE — 3077F SYST BP >= 140 MM HG: CPT | Performed by: FAMILY MEDICINE

## 2025-04-21 PROCEDURE — 99214 OFFICE O/P EST MOD 30 MIN: CPT | Performed by: FAMILY MEDICINE

## 2025-04-21 PROCEDURE — 1036F TOBACCO NON-USER: CPT | Performed by: FAMILY MEDICINE

## 2025-04-21 PROCEDURE — 3008F BODY MASS INDEX DOCD: CPT | Performed by: FAMILY MEDICINE

## 2025-04-21 PROCEDURE — 3080F DIAST BP >= 90 MM HG: CPT | Performed by: FAMILY MEDICINE

## 2025-04-21 RX ORDER — FENOFIBRATE 160 MG/1
160 TABLET ORAL
Qty: 90 TABLET | Refills: 3 | Status: SHIPPED | OUTPATIENT
Start: 2025-04-21

## 2025-04-21 RX ORDER — ALBUTEROL SULFATE 90 UG/1
2 INHALANT RESPIRATORY (INHALATION) EVERY 4 HOURS PRN
Qty: 8.5 G | Refills: 3 | Status: SHIPPED | OUTPATIENT
Start: 2025-04-21 | End: 2025-04-21

## 2025-04-21 RX ORDER — HYDROCHLOROTHIAZIDE 12.5 MG/1
12.5 CAPSULE ORAL
Qty: 90 CAPSULE | Refills: 3 | Status: SHIPPED | OUTPATIENT
Start: 2025-04-21

## 2025-04-21 RX ORDER — FLUTICASONE PROPIONATE 50 MCG
1 SPRAY, SUSPENSION (ML) NASAL DAILY
Qty: 16 G | Refills: 3 | Status: SHIPPED | OUTPATIENT
Start: 2025-04-21

## 2025-04-21 RX ORDER — LOSARTAN POTASSIUM 25 MG/1
25 TABLET ORAL
Qty: 90 TABLET | Refills: 3 | Status: SHIPPED | OUTPATIENT
Start: 2025-04-21 | End: 2025-04-21

## 2025-04-21 RX ORDER — FLUTICASONE PROPIONATE 50 MCG
1 SPRAY, SUSPENSION (ML) NASAL DAILY
Qty: 16 G | Refills: 3 | Status: SHIPPED | OUTPATIENT
Start: 2025-04-21 | End: 2025-04-21

## 2025-04-21 RX ORDER — CELECOXIB 200 MG/1
200 CAPSULE ORAL 2 TIMES DAILY PRN
Qty: 90 CAPSULE | Refills: 3 | Status: SHIPPED | OUTPATIENT
Start: 2025-04-21 | End: 2025-04-21

## 2025-04-21 RX ORDER — FLUTICASONE FUROATE AND VILANTEROL 100; 25 UG/1; UG/1
1 POWDER RESPIRATORY (INHALATION) DAILY
Qty: 60 EACH | Refills: 3 | Status: SHIPPED | OUTPATIENT
Start: 2025-04-21

## 2025-04-21 RX ORDER — HYDROCHLOROTHIAZIDE 12.5 MG/1
12.5 CAPSULE ORAL
Qty: 90 CAPSULE | Refills: 3 | Status: SHIPPED | OUTPATIENT
Start: 2025-04-21 | End: 2025-04-21

## 2025-04-21 RX ORDER — FENOFIBRATE 160 MG/1
160 TABLET ORAL
Qty: 90 TABLET | Refills: 3 | Status: SHIPPED | OUTPATIENT
Start: 2025-04-21 | End: 2025-04-21

## 2025-04-21 RX ORDER — CELECOXIB 200 MG/1
200 CAPSULE ORAL 2 TIMES DAILY PRN
Qty: 90 CAPSULE | Refills: 3 | Status: SHIPPED | OUTPATIENT
Start: 2025-04-21 | End: 2025-07-20

## 2025-04-21 RX ORDER — LOSARTAN POTASSIUM 25 MG/1
25 TABLET ORAL
Qty: 90 TABLET | Refills: 3 | Status: SHIPPED | OUTPATIENT
Start: 2025-04-21

## 2025-04-21 RX ORDER — FLUTICASONE FUROATE AND VILANTEROL 100; 25 UG/1; UG/1
1 POWDER RESPIRATORY (INHALATION) DAILY
Qty: 60 EACH | Refills: 3 | Status: SHIPPED | OUTPATIENT
Start: 2025-04-21 | End: 2025-04-21

## 2025-04-21 RX ORDER — ALBUTEROL SULFATE 90 UG/1
2 INHALANT RESPIRATORY (INHALATION) EVERY 4 HOURS PRN
Qty: 8.5 G | Refills: 3 | Status: SHIPPED | OUTPATIENT
Start: 2025-04-21 | End: 2026-04-21

## 2025-04-21 NOTE — TELEPHONE ENCOUNTER
"Pharmacy called the office today in regards to fluticasone furoate-vilanteroL (Breo Ellipta) 100-25 mcg/dose inhaler. The pharmacy states in regards to the quantity \"60 each (60 day supply) \" insurance won't accept this for as it is not clear enough to bill accurately. Pharmacy is wanting to know if you want to do 1 inhaler. Please advise  "

## 2025-04-21 NOTE — PROGRESS NOTES
"Subjective   Chief complaint: Charlie Massey is a 60 y.o. male who presents for Annual Exam and Med Refill (Losing insurance end of this month).    HPI:  Med Refill    Chronic disease management.  Active problems noted in [facility].  No anginal chest pain.  Breathing is stable.  No worsening of cough sputum.  Blood pressure is elevated.  He take 3 ibuprofen before he came here he usually checks his blood pressure is 120s 130s.  I reviewed his ambulatory office visits blood pressure is usually controlled.  For now continue current medical therapy reinforced lifestyle modifications.  Continue elation therapy.  He is losing his insurance.  Refills are provided.  He is due for general laboratory assessment.  He had been seen in our office.  Referred to rheumatology.  Rheumatology office evaluations reviewed.  Laboratory assessment unremarkable.  Continue Celebrex.  Follow-up in 6 months or sooner if necessary.    Objective   BP (!) 174/110 (BP Location: Left arm, Patient Position: Sitting, BP Cuff Size: Large adult)   Pulse 73   Temp 36.9 °C (98.4 °F) (Temporal)   Resp 18   Ht 1.727 m (5' 8\")   Wt 107 kg (235 lb 12.8 oz)   SpO2 97%   BMI 35.85 kg/m²   Physical Exam  Vitals and nursing note reviewed.   Constitutional:       Appearance: Normal appearance.   HENT:      Head: Normocephalic and atraumatic.   Eyes:      Extraocular Movements: Extraocular movements intact.      Conjunctiva/sclera: Conjunctivae normal.      Pupils: Pupils are equal, round, and reactive to light.   Cardiovascular:      Rate and Rhythm: Normal rate and regular rhythm.      Heart sounds: Normal heart sounds.   Pulmonary:      Effort: Pulmonary effort is normal.      Breath sounds: Normal breath sounds.   Abdominal:      General: Abdomen is flat. Bowel sounds are normal.      Palpations: Abdomen is soft.   Musculoskeletal:         General: Normal range of motion.   Skin:     General: Skin is warm and dry.   Neurological:      General: No " focal deficit present.      Mental Status: He is alert and oriented to person, place, and time. Mental status is at baseline.   Psychiatric:         Mood and Affect: Mood normal.         Behavior: Behavior normal.       Review of Systems   I have reviewed and reconciled the medication list with the patient today. Current Medications[1]     Imaging:  Imaging  No results found.    Cardiology, Vascular, and Other Imaging  No other imaging results found for the past 7 days       Labs reviewed:    Lab Results   Component Value Date    WBC 6.8 03/15/2024    HGB 15.1 03/15/2024    HCT 45.0 03/15/2024     03/15/2024    CHOL 180 08/28/2023    TRIG 168 (H) 08/28/2023    HDL 32.6 (A) 08/28/2023    ALT 32 03/01/2024    AST 27 03/01/2024     11/27/2024    K 4.4 11/27/2024     11/27/2024    CREATININE 0.87 11/27/2024    BUN 22 11/27/2024    CO2 29 11/27/2024    INR 1.0 09/02/2022    HGBA1C 6.1 (A) 08/28/2023       Assessment/Plan   Problem List Items Addressed This Visit           ICD-10-CM    Generalized anxiety disorder F41.1    Hyperlipidemia E78.5    Relevant Medications    fenofibrate (Triglide) 160 mg tablet    fluticasone (Flonase) 50 mcg/actuation nasal spray    Other Relevant Orders    Comprehensive metabolic panel    Lipid panel    CBC    Magnesium    Hypertension I10    Relevant Medications    fluticasone (Flonase) 50 mcg/actuation nasal spray    hydroCHLOROthiazide (Microzide) 12.5 mg capsule    losartan (Cozaar) 25 mg tablet    Other Relevant Orders    Comprehensive metabolic panel    Lipid panel    CBC    Magnesium    Pre-diabetes R73.03    Relevant Orders    Comprehensive metabolic panel    Lipid panel    CBC    Magnesium    Hemoglobin A1c    COPD exacerbation (Multi) J44.1    Relevant Medications    fluticasone furoate-vilanteroL (Breo Ellipta) 100-25 mcg/dose inhaler     Other Visit Diagnoses         Codes      Chronic pain of both shoulders    -  Primary M25.511, G89.29, M25.512      Chronic  obstructive pulmonary disease, unspecified COPD type (Multi)     J44.9    Relevant Medications    albuterol (ProAir HFA) 90 mcg/actuation inhaler      Primary osteoarthritis involving multiple joints     M15.0    Relevant Medications    celecoxib (CeleBREX) 200 mg capsule      SOB (shortness of breath)     R06.02    Relevant Medications    fluticasone furoate-vilanteroL (Breo Ellipta) 100-25 mcg/dose inhaler            Reinforced lifestyle modifications  Continue current medications as listed  Physical activity as tolerated and healthy diet encouraged  Maintain a healthy weight  Follow up in              [1]   Current Outpatient Medications:     albuterol (ProAir HFA) 90 mcg/actuation inhaler, Inhale 2 puffs every 4 hours if needed for wheezing or shortness of breath., Disp: 8.5 g, Rfl: 3    celecoxib (CeleBREX) 200 mg capsule, Take 1 capsule (200 mg) by mouth 2 times a day as needed for moderate pain (4 - 6)., Disp: 90 capsule, Rfl: 3    fenofibrate (Triglide) 160 mg tablet, Take 1 tablet (160 mg) by mouth once every day., Disp: 90 tablet, Rfl: 3    fluticasone (Flonase) 50 mcg/actuation nasal spray, Administer 1 spray into each nostril once daily. Shake gently. Before first use, prime pump. After use, clean tip and replace cap., Disp: 16 g, Rfl: 3    fluticasone furoate-vilanteroL (Breo Ellipta) 100-25 mcg/dose inhaler, Inhale 1 puff once daily., Disp: 60 each, Rfl: 3    hydroCHLOROthiazide (Microzide) 12.5 mg capsule, Take 1 capsule (12.5 mg) by mouth once every day., Disp: 90 capsule, Rfl: 3    losartan (Cozaar) 25 mg tablet, Take 1 tablet (25 mg) by mouth once every day., Disp: 90 tablet, Rfl: 3

## 2025-04-25 ENCOUNTER — TELEPHONE (OUTPATIENT)
Dept: PRIMARY CARE | Facility: CLINIC | Age: 60
End: 2025-04-25
Payer: COMMERCIAL

## 2025-04-25 NOTE — TELEPHONE ENCOUNTER
[facility] asking for clarification on   fluticasone furoate-vilanteroL (Breo Ellipta) 100-25 mcg/dose inhaler

## 2025-06-24 DIAGNOSIS — M15.0 PRIMARY OSTEOARTHRITIS INVOLVING MULTIPLE JOINTS: ICD-10-CM

## 2025-06-24 RX ORDER — CELECOXIB 200 MG/1
200 CAPSULE ORAL 2 TIMES DAILY PRN
Qty: 90 CAPSULE | Refills: 1 | Status: SHIPPED | OUTPATIENT
Start: 2025-06-24 | End: 2025-09-22

## 2025-06-24 NOTE — TELEPHONE ENCOUNTER
Med refills  Last OV 4/21/2025  Pending OV none   Printed AVS, provided to pt and reviewed. Pt indicated understanding and had no questions. Told pt that rx's have been sent to pharmacy and they should be ready for  in approximately 2 hrs. Reviewed medication ordered today. The pt wanted to reschedule her nutrition appt. Pt referred to the registrar.   Shyanne Ahn RN

## 2025-08-05 DIAGNOSIS — R06.02 SOB (SHORTNESS OF BREATH): ICD-10-CM

## 2025-08-05 DIAGNOSIS — J44.1 COPD EXACERBATION (MULTI): ICD-10-CM

## 2025-08-05 RX ORDER — FLUTICASONE FUROATE AND VILANTEROL 100; 25 UG/1; UG/1
1 POWDER RESPIRATORY (INHALATION) DAILY
Qty: 60 EACH | Refills: 3 | Status: SHIPPED | OUTPATIENT
Start: 2025-08-05

## 2025-08-05 NOTE — TELEPHONE ENCOUNTER
Last OV: 4-  Pending OV: None  Pt wife called to report refill needed. Please send Rx to Fax: [employer] [phone] the company assists with cost of medication.     Call to Pt wife Sari ([phone]) to inform office received message. Pt & wife aware.

## (undated) DEVICE — HOLSTER, JET SAFETY

## (undated) DEVICE — SOLUTION, SCRUB EXIDINE, 4% CHG, 8 OZ

## (undated) DEVICE — GLOVE, SURGICAL, PROTEXIS PI , 7.5, PF, LF

## (undated) DEVICE — CAUTERY, PENCIL, PUSH BUTTON, SMOKE EVAC, 70MM

## (undated) DEVICE — CATHETER, DIAGNOSTIC, 5FR,  PIG-145, 110CM, 6SH ANGLED

## (undated) DEVICE — GOWN, SURGICAL, ROYAL SILK, LG, STERILE

## (undated) DEVICE — NEEDLE, SPINAL, 22 G X 3.5 IN, BLACK HUB

## (undated) DEVICE — Device

## (undated) DEVICE — ENDO, PORT VERSASTEP 5MM

## (undated) DEVICE — FIXATION DEVICE, 20 TRACKER, SINGLE USE, LF

## (undated) DEVICE — BINDER, ABDOMINAL, 3 PANEL, 9 X 46-62 IN, MED/LG

## (undated) DEVICE — DRAPE, SHEET, XL

## (undated) DEVICE — ADHESIVE, SKIN, LIQUIBAND EXCEED

## (undated) DEVICE — ELECTRODE, ELECTROSURGICAL, LAPAROSCOPIC, L HOOK TIP, 36 IN

## (undated) DEVICE — SOLUTION KIT, ANTIFOG, 6CC, LF

## (undated) DEVICE — TOWELS 4-PK

## (undated) DEVICE — ENDO, PORT 5/11 VISIPORT RPF 176673P

## (undated) DEVICE — GOWN, SURGICAL, ROYAL SILK, XL, STERILE

## (undated) DEVICE — SUTURE, VICRYL, 3-0, 27 IN, CT-1, UNDYED

## (undated) DEVICE — DRAPE, SHEET, ENDOSCOPY, GENERAL, FENESTRATED, ARMBOARD COVER, 98 X 123.5 IN, DISPOSABLE, LF, STERILE

## (undated) DEVICE — MARKER, SKIN, W/ RULER, LF, STERILE

## (undated) DEVICE — TRAY, DRY PREP, PREMIUM

## (undated) DEVICE — SYRINGE, CONTROL, ANGIOGRAPHIC, FIXED MALE LUER, 10 CC

## (undated) DEVICE — STRAP, VELCRO, BODY, 4 X 60IN, NS

## (undated) DEVICE — NEEDLE, INSUFFLATION, 14 G, 100 MM

## (undated) DEVICE — SHEATH, PINNACLE, W/.038 GW 10CM, 5FR INTRODUCER, 2.5 CM DIALATOR

## (undated) DEVICE — NEEDLE, SAFETY, 25 GA X 1.5 IN

## (undated) DEVICE — SUTURE, MONOCRYL, 4-0, 27 IN, PS-2, UNDYED

## (undated) DEVICE — TUBING, MANIFOLD, LOW PRESSURE

## (undated) DEVICE — SYRINGE, ANGIOGRAPHIC, MULTIDOSE

## (undated) DEVICE — CLOSURE SYSTEM, VASCULAR, VASCADE, 5 F

## (undated) DEVICE — DRAPE SHEET, UTILITY, 26 X 15, W/ TAPE, STERILE

## (undated) DEVICE — SHEAR, W/UNIPOLAR CAUTERY, ENDOSHEAR, 5 MM

## (undated) DEVICE — DEVICE, SUTURE, ENDOCLOSE, TROCAR

## (undated) DEVICE — APPLICATOR, COTTON TIP, 6 IN, 2PK, STERILE

## (undated) DEVICE — TUBING, INSUFFLATION, LAPAROSCOPIC, FILTER, 10 FT

## (undated) DEVICE — PUMP, STRYKERFLOW 2 & HANDPIECE W/10FT. IRRIGATION TUBING

## (undated) DEVICE — MANIFOLD, 4 PORT NEPTUNE STANDARD

## (undated) DEVICE — STRAP, ARM BOARD, 32 X 1.5

## (undated) DEVICE — BANDAGE, QUIKCLOT, INTERVENTIONAL HEMO, W/O SLIT

## (undated) DEVICE — CATHETER, INFINITI DIAGNOSTIC, 5 FR 100CM 3DRC, WILLIAMS RIGHT OR NO TORQUE

## (undated) DEVICE — SUTURE, PROLENE, 0, 30 IN, CT1, BLUE

## (undated) DEVICE — CATHETER, DIAGNOSTIC, JUDKINS, LEFT, 5 FR-JL 4.0

## (undated) DEVICE — DRAPE, FLUID WARMING

## (undated) DEVICE — SOLUTION, IRRIGATION, STERILE WATER, 1000 ML, POUR BOTTLE